# Patient Record
Sex: FEMALE | Race: BLACK OR AFRICAN AMERICAN | NOT HISPANIC OR LATINO | Employment: FULL TIME | URBAN - METROPOLITAN AREA
[De-identification: names, ages, dates, MRNs, and addresses within clinical notes are randomized per-mention and may not be internally consistent; named-entity substitution may affect disease eponyms.]

---

## 2017-01-19 ENCOUNTER — OFFICE VISIT (OUTPATIENT)
Dept: OPHTHALMOLOGY | Facility: CLINIC | Age: 37
End: 2017-01-19
Payer: COMMERCIAL

## 2017-01-19 ENCOUNTER — OFFICE VISIT (OUTPATIENT)
Dept: OBSTETRICS AND GYNECOLOGY | Facility: CLINIC | Age: 37
End: 2017-01-19
Payer: COMMERCIAL

## 2017-01-19 VITALS
BODY MASS INDEX: 44.22 KG/M2 | WEIGHT: 240.31 LBS | HEIGHT: 62 IN | SYSTOLIC BLOOD PRESSURE: 110 MMHG | DIASTOLIC BLOOD PRESSURE: 80 MMHG

## 2017-01-19 DIAGNOSIS — Z30.42 ENCOUNTER FOR DEPO-PROVERA CONTRACEPTION: Primary | ICD-10-CM

## 2017-01-19 DIAGNOSIS — H52.13 MYOPIA, BILATERAL: ICD-10-CM

## 2017-01-19 DIAGNOSIS — E11.9 DIABETES MELLITUS TYPE 2 WITHOUT RETINOPATHY: Primary | ICD-10-CM

## 2017-01-19 PROCEDURE — 96372 THER/PROPH/DIAG INJ SC/IM: CPT | Mod: S$GLB,,, | Performed by: NURSE PRACTITIONER

## 2017-01-19 PROCEDURE — 99999 PR PBB SHADOW E&M-EST. PATIENT-LVL I: CPT | Mod: PBBFAC,,, | Performed by: OPTOMETRIST

## 2017-01-19 PROCEDURE — 3079F DIAST BP 80-89 MM HG: CPT | Mod: S$GLB,,, | Performed by: NURSE PRACTITIONER

## 2017-01-19 PROCEDURE — 92015 DETERMINE REFRACTIVE STATE: CPT | Mod: S$GLB,,, | Performed by: OPTOMETRIST

## 2017-01-19 PROCEDURE — 3074F SYST BP LT 130 MM HG: CPT | Mod: S$GLB,,, | Performed by: NURSE PRACTITIONER

## 2017-01-19 PROCEDURE — 99999 PR PBB SHADOW E&M-EST. PATIENT-LVL III: CPT | Mod: PBBFAC,,, | Performed by: NURSE PRACTITIONER

## 2017-01-19 PROCEDURE — 92014 COMPRE OPH EXAM EST PT 1/>: CPT | Mod: S$GLB,,, | Performed by: OPTOMETRIST

## 2017-01-19 PROCEDURE — 3078F DIAST BP <80 MM HG: CPT | Mod: S$GLB,,, | Performed by: OPTOMETRIST

## 2017-01-19 PROCEDURE — 81025 URINE PREGNANCY TEST: CPT | Mod: S$GLB,,, | Performed by: NURSE PRACTITIONER

## 2017-01-19 PROCEDURE — 3074F SYST BP LT 130 MM HG: CPT | Mod: S$GLB,,, | Performed by: OPTOMETRIST

## 2017-01-19 PROCEDURE — 99213 OFFICE O/P EST LOW 20 MIN: CPT | Mod: 25,S$GLB,, | Performed by: NURSE PRACTITIONER

## 2017-01-19 PROCEDURE — 1159F MED LIST DOCD IN RCRD: CPT | Mod: S$GLB,,, | Performed by: NURSE PRACTITIONER

## 2017-01-19 RX ORDER — PEN NEEDLE, DIABETIC 31 GX5/16"
NEEDLE, DISPOSABLE MISCELLANEOUS
Refills: 0 | COMMUNITY
Start: 2016-12-30 | End: 2017-06-11 | Stop reason: SDUPTHER

## 2017-01-19 RX ORDER — IBUPROFEN 800 MG/1
800 TABLET ORAL EVERY 8 HOURS PRN
Refills: 0 | COMMUNITY
Start: 2016-12-22 | End: 2017-08-21

## 2017-01-19 RX ORDER — MEDROXYPROGESTERONE ACETATE 150 MG/ML
150 INJECTION, SUSPENSION INTRAMUSCULAR
Status: DISCONTINUED | OUTPATIENT
Start: 2017-01-19 | End: 2018-03-14

## 2017-01-19 RX ADMIN — MEDROXYPROGESTERONE ACETATE 150 MG: 150 INJECTION, SUSPENSION INTRAMUSCULAR at 12:01

## 2017-01-19 NOTE — PROGRESS NOTES
Depo Provera 150 mg given IM right ventrogluteal after negative upt.  Order per Martha Hewitt on 1/19/17. Advised pt. To remain 15 min. After injection. No complications. Next injection due between 4/6/17 and 4/20/17.  Next appt. Scheduled for 4/13/17.

## 2017-01-19 NOTE — PROGRESS NOTES
HPI     IDDM exam. No visual complaints. Last eye exam 01/14/2016 TRF. Update   glasses RX. Patient left glasses today.       Last edited by Henna Curran on 1/19/2017  1:43 PM.         Assessment /Plan     For exam results, see Encounter Report.    Diabetes mellitus type 2 without retinopathy    Myopia, bilateral      No Background Diabetic Retinopathy    Dispense Final Rx for glasses.  RTC 1 year

## 2017-01-19 NOTE — PROGRESS NOTES
"  Maikol Pacheco is a 36 y.o. female  presents for wanting to change from micronor to depo provera - discussed weight gain.   LMP: Patient's last menstrual period was 2017..  No issues, problems, or complaints.      Past Medical History   Diagnosis Date    Anemia     Diabetes mellitus type II      BS- 400's last week    Gastroparesis     Hyperlipidemia     Hypertension     Morbid obesity      Past Surgical History   Procedure Laterality Date    Tonsillectomy       Social History     Social History    Marital status: Single     Spouse name: N/A    Number of children: 0    Years of education: N/A     Occupational History     [employer]     Social History Main Topics    Smoking status: Never Smoker    Smokeless tobacco: Not on file    Alcohol use Yes      Comment: socially    Drug use: No    Sexual activity: Yes     Partners: Male     Birth control/ protection: Condom     Other Topics Concern    Not on file     Social History Narrative    Single. Lives with mom. Has no children. 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      Family History   Problem Relation Age of Onset    Hyperlipidemia Mother     Diabetes Maternal Grandfather     Cancer Maternal Aunt      breast    Diabetes Maternal Grandmother     Stroke Maternal Grandmother     Hypertension Other      OB History      Para Term  AB TAB SAB Ectopic Multiple Living    0 0 0 0 0 0 0 0 0 0          Visit Vitals    /80    Ht 5' 2" (1.575 m)    Wt 109 kg (240 lb 4.8 oz)    LMP 2017    BMI 43.95 kg/m2         ROS:  Per hpi    PHYSICAL EXAM:  APPEARANCE: Well nourished, well developed, in no acute distress.  AFFECT: WNL, alert and oriented x 3  deferred  Physical Exam    1. Encounter for Depo-Provera contraception  POCT urine pregnancy    medroxyPROGESTERone (DEPO-PROVERA) syringe 150 mg    AND PLAN:    Ok with weight gain states with work with it  upt was negative   start depo provera 150 " mg IM today

## 2017-03-12 ENCOUNTER — PATIENT MESSAGE (OUTPATIENT)
Dept: DIABETES | Facility: CLINIC | Age: 37
End: 2017-03-12

## 2017-03-17 ENCOUNTER — PATIENT MESSAGE (OUTPATIENT)
Dept: DIABETES | Facility: CLINIC | Age: 37
End: 2017-03-17

## 2017-03-17 ENCOUNTER — OFFICE VISIT (OUTPATIENT)
Dept: INTERNAL MEDICINE | Facility: CLINIC | Age: 37
End: 2017-03-17
Payer: COMMERCIAL

## 2017-03-17 VITALS
DIASTOLIC BLOOD PRESSURE: 80 MMHG | BODY MASS INDEX: 44.18 KG/M2 | HEIGHT: 62 IN | WEIGHT: 240.06 LBS | OXYGEN SATURATION: 99 % | TEMPERATURE: 99 F | SYSTOLIC BLOOD PRESSURE: 126 MMHG | HEART RATE: 103 BPM

## 2017-03-17 DIAGNOSIS — E11.69 HYPERLIPIDEMIA ASSOCIATED WITH TYPE 2 DIABETES MELLITUS: Chronic | ICD-10-CM

## 2017-03-17 DIAGNOSIS — I15.2 HYPERTENSION ASSOCIATED WITH DIABETES: Chronic | ICD-10-CM

## 2017-03-17 DIAGNOSIS — E78.5 HYPERLIPIDEMIA ASSOCIATED WITH TYPE 2 DIABETES MELLITUS: Chronic | ICD-10-CM

## 2017-03-17 DIAGNOSIS — E66.01 MORBID OBESITY WITH BMI OF 40.0-44.9, ADULT: ICD-10-CM

## 2017-03-17 DIAGNOSIS — E11.59 HYPERTENSION ASSOCIATED WITH DIABETES: Chronic | ICD-10-CM

## 2017-03-17 PROCEDURE — 99213 OFFICE O/P EST LOW 20 MIN: CPT | Mod: S$GLB,,, | Performed by: FAMILY MEDICINE

## 2017-03-17 PROCEDURE — 3060F POS MICROALBUMINURIA REV: CPT | Mod: S$GLB,,, | Performed by: FAMILY MEDICINE

## 2017-03-17 PROCEDURE — 3046F HEMOGLOBIN A1C LEVEL >9.0%: CPT | Mod: S$GLB,,, | Performed by: FAMILY MEDICINE

## 2017-03-17 PROCEDURE — 3079F DIAST BP 80-89 MM HG: CPT | Mod: S$GLB,,, | Performed by: FAMILY MEDICINE

## 2017-03-17 PROCEDURE — 3074F SYST BP LT 130 MM HG: CPT | Mod: S$GLB,,, | Performed by: FAMILY MEDICINE

## 2017-03-17 PROCEDURE — 1160F RVW MEDS BY RX/DR IN RCRD: CPT | Mod: S$GLB,,, | Performed by: FAMILY MEDICINE

## 2017-03-17 PROCEDURE — 99999 PR PBB SHADOW E&M-EST. PATIENT-LVL III: CPT | Mod: PBBFAC,,, | Performed by: FAMILY MEDICINE

## 2017-03-17 NOTE — MR AVS SNAPSHOT
[facility] Internal Medicine  [address] [place] [address]  Phone: [phone]  Fax: [phone]                  Maikol Pacheco   3/17/2017 3:20 PM   Office Visit    Description:  Female : 1980   Provider:  Maisha Bartholomew MD   Department:  [facility] - Internal Medicine           Reason for Visit     Follow-up           Diagnoses this Visit        Comments    Uncontrolled type 2 diabetes mellitus with diabetic polyneuropathy, with long-term current use of insulin    -  Primary     Hypertension associated with diabetes         Hyperlipidemia associated with type 2 diabetes mellitus         Morbid obesity with BMI of 40.0-44.9, adult                To Do List           Future Appointments        Provider Department Dept Phone    3/30/2017 1:20 PM LABORATORY, [facility] [phone]    3/30/2017 2:30 PM Brennan Granados Jr., GLENDY [facility] Diabetes Management [phone]    3/31/2017 4:00 PM Jesika Weber RD, CDE [facility] Diabetes Management [phone]    2017 1:30 PM OB GYN NURSE, [facility] OB/ -[phone]    2017 8:40 AM Maisha Bartholomew MD [facility] [phone]      Goals (5 Years of Data)     None      Follow-Up and Disposition     Return in about 4 months (around 2017), or if symptoms worsen or fail to improve.      [facility]     [facility] Care [facility] -  Assistance  Registered nurses in the [facility] provide clinical advisement, health education, appointment booking, and other advisory services.  Call for this free service at [phone].             Medications           Message regarding Medications     Verify the changes and/or additions to your medication regime listed below are the same as discussed with your clinician today.  If any of these changes or additions are incorrect, please notify your healthcare provider.             Verify that the below list of medications is an  "accurate representation of the medications you are currently taking.  If none reported, the list may be blank. If incorrect, please contact your healthcare provider. Carry this list with you in case of emergency.           Current Medications     albuterol 90 mcg/actuation inhaler Inhale 2 puffs into the lungs every 4 (four) hours as needed for Wheezing or Shortness of Breath (Or Cough).    BD INSULIN PEN NEEDLE UF MINI 31 gauge x 3/16" Ndle AS DIRECTED    blood sugar diagnostic (ONETOUCH ULTRA TEST) Strp 1 each by Misc.(Non-Drug; Combo Route) route 3 (three) times daily.    exenatide microspheres (BYDUREON) 2 mg SSRR Inject 2 mg into the skin once a week.    ferrous sulfate 325 mg (65 mg iron) Tab tablet Take 325 mg by mouth once daily.     glimepiride (AMARYL) 4 MG tablet Take 1 tablet (4 mg total) by mouth 2 (two) times daily.    ibuprofen (ADVIL,MOTRIN) 800 MG tablet Take 800 mg by mouth every 8 (eight) hours as needed.    insulin syringe-needle U-100 0.5 mL 31 gauge x 5/16 Syrg 1 Syringe by Misc.(Non-Drug; Combo Route) route 4 (four) times daily before meals and nightly.    lancets (ONETOUCH DELICA LANCETS) 33 gauge Misc 1 lancet by Misc.(Non-Drug; Combo Route) route 2 (two) times daily.    metformin (GLUMETZA) 1000 MG (MOD) 24 hr tablet Take 1 tablet (1,000 mg total) by mouth 2 (two) times daily with meals.    ramipril (ALTACE) 5 MG capsule Take 1 capsule (5 mg total) by mouth once daily.    simvastatin (ZOCOR) 20 MG tablet Take 1 tablet (20 mg total) by mouth every evening. Generic ok    trazodone (DESYREL) 50 MG tablet Take 1 tablet (50 mg total) by mouth every evening.    gabapentin (NEURONTIN) 300 MG capsule Take 1 capsule (300 mg total) by mouth every evening.    insulin aspart (NOVOLOG) 100 unit/mL InPn pen Inject 8 Units into the skin 3 (three) times daily with meals.           Clinical Reference Information           Your Vitals Were     BP Pulse Temp Height Weight SpO2    126/80 103 98.6 °F (37 °C) " "(Tympanic) 5' 2" (1.575 m) 108.9 kg (240 lb 1.3 oz) 99%    BMI                43.91 kg/m2          Blood Pressure          Most Recent Value    BP  126/80      Allergies as of 3/17/2017     No Known Drug Allergies      Immunizations Administered on Date of Encounter - 3/17/2017     None      Orders Placed During Today's Visit     Future Labs/Procedures Expected by Expires    Hemoglobin A1c  3/30/2017 5/16/2018      Language Assistance Services     ATTENTION: Language assistance services are available, free of charge. Please call [phone].      ATENCIÓN: Si habla español, tiene a kwan disposición servicios gratuitos de asistencia lingüística. Llame al [phone].     CHÚ Ý: N?u b?n nói Ti?ng Vi?t, có các d?ch v? h? tr? ngôn ng? mi?n phí dành cho b?n. G?i s? [phone].         [facility] complies with applicable Federal civil rights laws and does not discriminate on the basis of race, color, national origin, age, disability, or sex.        "

## 2017-03-17 NOTE — PROGRESS NOTES
Subjective:       Patient ID: Maikol Pacheco is a 36 y.o. female.    Chief Complaint: Follow-up    HPI Comments: 36-year-old Afro-American female patient with Patient Active Problem List:     Hyperlipidemia associated with type 2 diabetes mellitus     Hypertension associated with diabetes     Anemia     Morbid obesity     Uncontrolled type 2 diabetes mellitus with diabetic polyneuropathy, with long-term current use of insulin     Morbid obesity with BMI of 40.0-44.9, adult  Here for follow-up on chronic medical conditions.  Patient reports that she's been taking Amaryl , bydureon and metformin as recommended.   Patient had seen diabetes clinic end of December, and changes were made to insulin  regimen.  Patient currently reports that she's been taking NovoLog 8 units 3 times daily and has not initiated the change, of other insulins prescribed by diabetes clinic.   Patient did not take her insulin this morning and she was in a hurry  Reported that her blood glucose levels are running in the range of high 100 's and  it was 200 this morning  Denies of any polyuria polydipsia polyphagia, patient has been doing well since taking gabapentin with neuropathy  Patient missed work on 3/13/17 secondary to feeling dizzy and elevated blood glucose levels in the range of 300s.   Patient has been missing work 1- 2 days in a month secondary to fluctuating blood glucose levels, but reports that she's working on controlling her blood glucose levels       Review of Systems   Constitutional: Negative for fatigue.   Eyes: Negative for visual disturbance.   Respiratory: Negative for shortness of breath.    Cardiovascular: Negative for chest pain and leg swelling.   Gastrointestinal: Negative for abdominal pain, nausea and vomiting.   Endocrine: Negative for polydipsia, polyphagia and polyuria.   Musculoskeletal: Negative for myalgias.   Skin: Negative for rash.   Neurological: Negative for weakness, light-headedness, numbness  "and headaches.   Psychiatric/Behavioral: Negative for sleep disturbance.         /80  Pulse 103  Temp 98.6 °F (37 °C) (Tympanic)   Ht 5' 2" (1.575 m)  Wt 108.9 kg (240 lb 1.3 oz)  SpO2 99%  BMI 43.91 kg/m2  Objective:      Physical Exam   Constitutional: She is oriented to person, place, and time. She appears well-developed and well-nourished.   HENT:   Head: Normocephalic and atraumatic.   Mouth/Throat: Oropharynx is clear and moist.   Cardiovascular: Normal rate, regular rhythm and normal heart sounds.    No murmur heard.  Pulmonary/Chest: Effort normal and breath sounds normal. She has no wheezes.   Abdominal: Soft. Bowel sounds are normal. There is no tenderness.   Musculoskeletal: She exhibits no edema or tenderness.   Neurological: She is alert and oriented to person, place, and time.   Skin: Skin is warm and dry. No rash noted.   Psychiatric: She has a normal mood and affect.         Assessment:       1. Uncontrolled type 2 diabetes mellitus with diabetic polyneuropathy, with long-term current use of insulin    2. Hypertension associated with diabetes    3. Hyperlipidemia associated with type 2 diabetes mellitus    4. Morbid obesity with BMI of 40.0-44.9, adult        Plan:   Uncontrolled type 2 diabetes mellitus with diabetic polyneuropathy, with long-term current use of insulin  -     Hemoglobin A1c; Future; Expected date: 3/30/17  Patient was advised to start taking insulin as recommended by diabetes clinic   Will check A1c on the day of diabetes clinic visit .  Encouraged to be compliant with taking his medications, strict lifestyle changes recommended with diet and exercise      Hypertension associated with diabetes-blood pressure stable today, continue current regimen of taking ramipril 5 mg daily    Hyperlipidemia associated with type 2 diabetes mellitus-stable on simvastatin 80 mg daily    Morbid obesity with BMI of 40.0-44.9, adult-start strict lifestyle changes with diet and exercise to " lose weight with BMI 43.9.

## 2017-03-31 ENCOUNTER — NUTRITION (OUTPATIENT)
Dept: DIABETES | Facility: CLINIC | Age: 37
End: 2017-03-31
Payer: COMMERCIAL

## 2017-03-31 VITALS — HEIGHT: 62 IN | WEIGHT: 234.56 LBS | BODY MASS INDEX: 43.16 KG/M2

## 2017-03-31 DIAGNOSIS — E11.8 TYPE 2 DIABETES MELLITUS WITH COMPLICATION, WITH LONG-TERM CURRENT USE OF INSULIN: Primary | ICD-10-CM

## 2017-03-31 DIAGNOSIS — Z79.4 TYPE 2 DIABETES MELLITUS WITH COMPLICATION, WITH LONG-TERM CURRENT USE OF INSULIN: Primary | ICD-10-CM

## 2017-03-31 PROCEDURE — G0108 DIAB MANAGE TRN  PER INDIV: HCPCS | Mod: S$GLB,,, | Performed by: DIETITIAN, REGISTERED

## 2017-03-31 PROCEDURE — 99999 PR PBB SHADOW E&M-EST. PATIENT-LVL III: CPT | Mod: PBBFAC,,, | Performed by: DIETITIAN, REGISTERED

## 2017-03-31 NOTE — MR AVS SNAPSHOT
[facility] - Diabetes Management  [address] [place] [address]  Phone: [phone]  Fax: [phone]                  Maikol Pacheco   3/31/2017 4:00 PM   Nutrition    Description:  Female : 1980   Provider:  Jesika Weber RD, ZAIDA   Department:  [facility] - Diabetes Management           Reason for Visit     Diabetes                To Do List           Future Appointments        Provider Department Dept Phone    3/31/2017 4:00 PM Jesika Weber RD, CDE [facility] Diabetes Management [phone]    2017 12:05 PM LABORATORY, [facility] [phone]    2017 1:30 PM OB GYN NURSE, [facility] OB/ -[phone]    2017 2:30 PM Brennan Granados Jr., GLENDY [facility] Diabetes Management [phone]    2017 8:40 AM Maisha Bartholomew MD [facility] Internal Medicine [phone]      Goals (5 Years of Data)     None      Follow-Up and Disposition     Return in about 2 months (around 2017), or RS appt Ory/lab ?Thurs pm. Meter w/ Rx; [facility] appt.      [facility]     [facility] Care [facility] -  Assistance  Unless otherwise directed by your provider, please contact [facility], our nurse care line that is available for  assistance.     Registered nurses in the [facility] provide: appointment scheduling, clinical advisement, health education, and other advisory services.  Call: [phone] (toll free)               Medications           Message regarding Medications     Verify the changes and/or additions to your medication regime listed below are the same as discussed with your clinician today.  If any of these changes or additions are incorrect, please notify your healthcare provider.             Verify that the below list of medications is an accurate representation of the medications you are currently taking.  If none reported, the list may be blank. If incorrect, please contact your healthcare provider.  "Carry this list with you in case of emergency.           Current Medications     albuterol 90 mcg/actuation inhaler Inhale 2 puffs into the lungs every 4 (four) hours as needed for Wheezing or Shortness of Breath (Or Cough).    BD INSULIN PEN NEEDLE UF MINI 31 gauge x 3/16" Ndle AS DIRECTED    blood sugar diagnostic (ONETOUCH ULTRA TEST) Strp 1 each by Misc.(Non-Drug; Combo Route) route 3 (three) times daily.    exenatide microspheres (BYDUREON) 2 mg SSRR Inject 2 mg into the skin once a week.    ferrous sulfate 325 mg (65 mg iron) Tab tablet Take 325 mg by mouth once daily.     gabapentin (NEURONTIN) 300 MG capsule Take 1 capsule (300 mg total) by mouth every evening.    glimepiride (AMARYL) 4 MG tablet Take 1 tablet (4 mg total) by mouth 2 (two) times daily.    ibuprofen (ADVIL,MOTRIN) 800 MG tablet Take 800 mg by mouth every 8 (eight) hours as needed.    insulin aspart (NOVOLOG) 100 unit/mL InPn pen Inject 8 Units into the skin 3 (three) times daily with meals.    INSULIN GLARGINE,HUM.REC.ANLOG (LANTUS SOLOSTAR SUBQ) Inject 30 Units into the skin once daily.    lancets (ONETOUCH DELICA LANCETS) 33 gauge Misc 1 lancet by Misc.(Non-Drug; Combo Route) route 2 (two) times daily.    metformin (GLUMETZA) 1000 MG (MOD) 24 hr tablet Take 1 tablet (1,000 mg total) by mouth 2 (two) times daily with meals.    ramipril (ALTACE) 5 MG capsule Take 1 capsule (5 mg total) by mouth once daily.    simvastatin (ZOCOR) 20 MG tablet Take 1 tablet (20 mg total) by mouth every evening. Generic ok    trazodone (DESYREL) 50 MG tablet Take 1 tablet (50 mg total) by mouth every evening.    insulin syringe-needle U-100 0.5 mL 31 gauge x 5/16 Syrg 1 Syringe by Misc.(Non-Drug; Combo Route) route 4 (four) times daily before meals and nightly.           Clinical Reference Information           Your Vitals Were     Height Weight BMI          5' 2" (1.575 m) 106.4 kg (234 lb 9.1 oz) 42.9 kg/m2        Allergies as of 3/31/2017     No Known Drug " Allergies      Immunizations Administered on Date of Encounter - 3/31/2017     None      Language Assistance Services     ATTENTION: Language assistance services are available, free of charge. Please call [phone].      ATENCIÓN: Si gala lozano, tiene a kwan disposición servicios gratuitos de asistencia lingüística. Llame al [phone].     CHÚ Ý: N?u b?n nói Ti?ng Vi?t, có các d?ch v? h? tr? ngôn ng? mi?n phí dành cho b?n. G?i s? [phone].         [facility] complies with applicable Federal civil rights laws and does not discriminate on the basis of race, color, national origin, age, disability, or sex.

## 2017-03-31 NOTE — PROGRESS NOTES
Diabetes Education  Author: Jesika Weber RD, CDE  Date: 3/31/2017    Diabetes Education Visit  Diabetes Education Record Assessment/Progress: Comprehensive/Group    Diabetes Type  Diabetes Type : Type II    Diabetes History  Diabetes Diagnosis: 5-10 years (2010)    Nutrition  Meal Planning:  (Intake ~8555-2950 w/ excess carb, fat, sodium from dining out 2-3 times pwer week. Inadequate non-starchy vegetables. )  Meal Plan 24 Hour Recall - Breakfast: zack steve sandwich (reg), lg orange - water, coffee   Meal Plan 24 Hour Recall - Lunch: perdomo cheeseburger (1/2), side salad (ranch dressing) OR burger renay meal - flv water  Meal Plan 24 Hour Recall - Dinner: lean cuisine OR baked chix, cauliflower rice - water   Meal Plan 24 Hour Recall - Snack: fruit OR rice cake     Monitoring   Monitoring: Other  Self Monitoring : Per recall, fasting BG 200s-300.   Blood Glucose Logs: No    Exercise   Exercise Type: walking ( )  Intensity: Moderate  Frequency: 3-5 Times per week  Duration: 1 hour    Current Diabetes Treatment   Current Treatment: Oral Medication, Diet, Injectable, Exercise, Insulin    Social History  Preferred Learning Method: Face to Face  Primary Support: Self, Family  Occupation: Single. Lives with mom. Has no children. 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   Smoking Status: Never a Smoker  Alcohol Use: Rarely                             Barriers to Change  Barriers to Change: None  Learning Challenges : None    Readiness to Learn   Readiness to Learn : Eager    Cultural Influences  Cultural Influences: No    Diabetes Education Assessment/Progress  Acute Complications (preventing, detecting, and treating acute complications): Discussion, Competent (verbalizes/demonstrates), Written Materials Provided, Individual Session  Chronic Complications (preventing, detecting, and treating chronic complications): Discussion, Competent (verbalizes/demonstrates), Individual Session  Diabetes Disease Process  (diabetes disease process and treatment options): Discussion, Competent (verbalizes/demonstrates), Individual Session  Nutrition (Incorporating nutritional management into one's lifestyle): Discussion, Written Materials Provided, Competent (verbalizes/demonstrates), Individual Session  Physical Activity (incorporating physical activity into one's lifestyle): Discussion, Competent (verbalizes/demonstrates), Individual Session  Medications (states correct name, dose, onset, peak, duration, side effects & timing of meds): Discussion, Written Materials Provided, Competent (verbalizes/demonstrates), Individual Session  Monitoring (monitoring blood glucose/other parameters & using results): Discussion, Competent (verbalizes/demonstrates), Written Materials Provided, Individual Session  Goal Setting and Problem Solving (verbalizes behavior change strategies & sets realistic goals): Discussion, Competent (verbalizes/demonstrates), Individual Session  Behavior Change (developing personal strategies to health & behavior change): Discussion, Comnpetent (verbalizes/demonstrates), Individual Session  Psychosocial Issues (developing personal srategies to address psychosocial concerns): Competent (verbalizes/demonstrates), Discussion, Individual Session    Goals  Start Date: 03/31/17 (use meal plan - switch to zack montalvo, limit dining out to once per week, increase nonstarchy vegetable 2+ cups at lunch and dinner)  Target Date: 06/30/17  Monitoring: Set  Start Date: 03/31/17 (test bg fasting, acl, acd daily)  Target Date: 06/30/17         Diabetes Care Plan/Intervention  Education Plan/Intervention: Endocrine Provider Visit Set Up, Individual Follow-Up DSMT    Diabetes Meal Plan  Restrictions: Low Fat, Low Sodium  Calories: 1200  Carbohydrate Per Meal: 20-30g, 30-45g  Carbohydrate Per Snack : 15-20g    Education Units of Time   Time Spent: 30 min      Health Maintenance Topics with due status: Not Due       Topic Last  Completion Date    Pneumococcal PPSV23 (Medium Risk) 01/14/2014    TETANUS VACCINE 04/11/2014    Pap Smear 12/09/2016    Foot Exam 12/29/2016    Lipid Panel 12/29/2016    Hemoglobin A1c 12/29/2016    Eye Exam 01/19/2017     There are no preventive care reminders to display for this patient.

## 2017-04-02 ENCOUNTER — PATIENT MESSAGE (OUTPATIENT)
Dept: INTERNAL MEDICINE | Facility: CLINIC | Age: 37
End: 2017-04-02

## 2017-04-13 ENCOUNTER — CLINICAL SUPPORT (OUTPATIENT)
Dept: OBSTETRICS AND GYNECOLOGY | Facility: CLINIC | Age: 37
End: 2017-04-13
Payer: COMMERCIAL

## 2017-04-13 PROCEDURE — 96372 THER/PROPH/DIAG INJ SC/IM: CPT | Mod: S$GLB,,, | Performed by: NURSE PRACTITIONER

## 2017-04-13 RX ADMIN — MEDROXYPROGESTERONE ACETATE 150 MG: 150 INJECTION, SUSPENSION INTRAMUSCULAR at 01:04

## 2017-04-13 NOTE — PROGRESS NOTES
Identified patient using two patient identifiers. Allergies verified with patient. Depo Provera 150mg IM injection given in left ventrogluteal. Patient tolerated well and was advised to remain in the office for 15 minutes. Patient verbalized understanding. Next injection due between 06/29/17 and 07/13/17. Appointment scheduled and patient is aware of appointment date, time and location.

## 2017-04-24 ENCOUNTER — PATIENT MESSAGE (OUTPATIENT)
Dept: INTERNAL MEDICINE | Facility: CLINIC | Age: 37
End: 2017-04-24

## 2017-05-01 ENCOUNTER — PATIENT MESSAGE (OUTPATIENT)
Dept: INTERNAL MEDICINE | Facility: CLINIC | Age: 37
End: 2017-05-01

## 2017-05-01 DIAGNOSIS — J04.0 LARYNGITIS ACUTE, SPASMODIC: ICD-10-CM

## 2017-05-01 RX ORDER — INSULIN ASPART 100 [IU]/ML
8 INJECTION, SOLUTION INTRAVENOUS; SUBCUTANEOUS
Qty: 7.2 ML | Refills: 11 | Status: SHIPPED | OUTPATIENT
Start: 2017-05-01 | End: 2017-06-25 | Stop reason: SDUPTHER

## 2017-05-01 RX ORDER — ALBUTEROL SULFATE 90 UG/1
2 AEROSOL, METERED RESPIRATORY (INHALATION) EVERY 4 HOURS PRN
Qty: 1 INHALER | Refills: 0 | Status: SHIPPED | OUTPATIENT
Start: 2017-05-01 | End: 2017-11-07 | Stop reason: SDUPTHER

## 2017-05-01 RX ORDER — INSULIN GLARGINE 100 [IU]/ML
30 INJECTION, SOLUTION SUBCUTANEOUS DAILY
Qty: 15 ML | Refills: 4 | Status: SHIPPED | OUTPATIENT
Start: 2017-05-01 | End: 2017-06-15 | Stop reason: ALTCHOICE

## 2017-05-01 NOTE — TELEPHONE ENCOUNTER
Last visit with Dr. Bartholomew on 3/17/17. Last refill was 11/29/16 by Dr. Barron. Pt's next appt is with Dr. Bartholomew on 7/27/17.

## 2017-06-09 ENCOUNTER — NUTRITION (OUTPATIENT)
Dept: DIABETES | Facility: CLINIC | Age: 37
End: 2017-06-09
Payer: COMMERCIAL

## 2017-06-09 ENCOUNTER — LAB VISIT (OUTPATIENT)
Dept: LAB | Facility: HOSPITAL | Age: 37
End: 2017-06-09
Attending: FAMILY MEDICINE
Payer: COMMERCIAL

## 2017-06-09 VITALS — BODY MASS INDEX: 41.86 KG/M2 | WEIGHT: 227.5 LBS | HEIGHT: 62 IN

## 2017-06-09 DIAGNOSIS — E11.8 TYPE 2 DIABETES MELLITUS WITH COMPLICATION, WITH LONG-TERM CURRENT USE OF INSULIN: Primary | ICD-10-CM

## 2017-06-09 DIAGNOSIS — Z79.4 TYPE 2 DIABETES MELLITUS WITH COMPLICATION, WITH LONG-TERM CURRENT USE OF INSULIN: Primary | ICD-10-CM

## 2017-06-09 PROCEDURE — 99999 PR PBB SHADOW E&M-EST. PATIENT-LVL III: CPT | Mod: PBBFAC,,, | Performed by: DIETITIAN, REGISTERED

## 2017-06-09 PROCEDURE — G0108 DIAB MANAGE TRN  PER INDIV: HCPCS | Mod: S$GLB,,, | Performed by: DIETITIAN, REGISTERED

## 2017-06-09 PROCEDURE — 83036 HEMOGLOBIN GLYCOSYLATED A1C: CPT

## 2017-06-09 PROCEDURE — 36415 COLL VENOUS BLD VENIPUNCTURE: CPT | Mod: PO

## 2017-06-09 RX ORDER — METFORMIN HYDROCHLORIDE 1000 MG/1
1000 TABLET, FILM COATED, EXTENDED RELEASE ORAL 2 TIMES DAILY WITH MEALS
COMMUNITY
End: 2017-10-02

## 2017-06-09 RX ORDER — METFORMIN HYDROCHLORIDE 500 MG/1
500 TABLET ORAL 2 TIMES DAILY WITH MEALS
COMMUNITY
End: 2017-06-09 | Stop reason: CLARIF

## 2017-06-09 NOTE — PROGRESS NOTES
Diabetes Education  Author: Jesika Weber RD, CDE  Date: 6/9/2017    Diabetes Education Visit  Diabetes Education Record Assessment/Progress: Comprehensive/Group    Diabetes Type  Diabetes Type : Type II    Nutrition  Meal Planning:  (7669-6955 cals/d w/ excess carb, fat, sodium from dining out meals.)  Meal Plan 24 Hour Recall - Breakfast: eggs, sausage OR slim fast shake, boiled egg - water   Meal Plan 24 Hour Recall - Lunch: fried fish, lasagne - vit water zero   Meal Plan 24 Hour Recall - Dinner: taco bell - diet pepsi   Meal Plan 24 Hour Recall - Snack: sf popsicle, water    Monitoring   Monitoring: Other  Self Monitoring : Per recall, fst bg 230-240; rare midday 190s; bed 270-280.     Blood Glucose Logs: No    Exercise   Exercise Type: walking ([employer] - walks 60 min 3d/wk)    Current Diabetes Treatment   Current Treatment: Oral Medication, Diet, Exercise, Injectable, Insulin    Social History  Preferred Learning Method: Face to Face  Primary Support: Self, Family  Occupation: Single. Lives with mom. Has no children. 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   Smoking Status: Never a Smoker  Alcohol Use: Never      Barriers to Change  Barriers to Change: None  Learning Challenges : None    Readiness to Learn   Readiness to Learn : Acceptance    Cultural Influences  Cultural Influences: No    Diabetes Education Assessment/Progress  Acute Complications (preventing, detecting, and treating acute complications): Discussion, Competent (verbalizes/demonstrates), Written Materials Provided, Individual Session  Chronic Complications (preventing, detecting, and treating chronic complications): Discussion, Competent (verbalizes/demonstrates), Individual Session  Diabetes Disease Process (diabetes disease process and treatment options): Discussion, Competent (verbalizes/demonstrates), Individual Session  Nutrition (Incorporating nutritional management into one's lifestyle): Discussion, Written Materials  Provided, Competent (verbalizes/demonstrates), Individual Session (Custom menus developed w/ pt to assist improvement of food quality.)  Physical Activity (incorporating physical activity into one's lifestyle): Discussion, Competent (verbalizes/demonstrates), Individual Session  Medications (states correct name, dose, onset, peak, duration, side effects & timing of meds): Discussion, Written Materials Provided, Competent (verbalizes/demonstrates), Individual Session  Monitoring (monitoring blood glucose/other parameters & using results): Discussion, Competent (verbalizes/demonstrates), Written Materials Provided, Individual Session (Reminded pt to bring meter/logbook to clinic.)  Goal Setting and Problem Solving (verbalizes behavior change strategies & sets realistic goals): Discussion, Competent (verbalizes/demonstrates), Individual Session, Written Materials Provided  Behavior Change (developing personal strategies to health & behavior change): Discussion, Comnpetent (verbalizes/demonstrates), Individual Session  Psychosocial Issues (developing personal srategies to address psychosocial concerns): Competent (verbalizes/demonstrates), Discussion, Individual Session    Goals  Healthy Eating: Set  Start Date: 06/09/17 (use meal plan - custom menus, avoid fast foods)  Target Date: 09/09/17  Monitoring: Set (test bg fasting, acl, acd daily; bring meter, records to clinic)  Start Date: 06/09/17  Target Date: 09/09/17  Medications: Set  Start Date: 06/09/17 (Due to glu patterns, instructed to increase novolog 12 units ac. Continue other medications as directed and fu with Ciara next week for additional eval. Will update A1C per protocol. )  Target Date: 09/09/17    Diabetes Care Plan/Intervention  Education Plan/Intervention: Individual Follow-Up DSMT, Endocrine Provider Visit Set Up    Diabetes Meal Plan  Restrictions: Low Fat, Low Sodium  Calories: 1200, 1400  Carbohydrate Per Meal: 20-30g, 30-45g  Carbohydrate  Per Snack : 15-20g, 7-15g    Education Units of Time   Time Spent: 30 min      Health Maintenance Topics with due status: Not Due       Topic Last Completion Date    Pneumococcal PPSV23 (Medium Risk) 01/14/2014    TETANUS VACCINE 04/11/2014    Influenza Vaccine 10/28/2016    Pap Smear with HPV Cotest 12/09/2016    Foot Exam 12/29/2016    Lipid Panel 12/29/2016    Hemoglobin A1c 12/29/2016    Eye Exam 01/19/2017     There are no preventive care reminders to display for this patient.

## 2017-06-10 LAB
ESTIMATED AVG GLUCOSE: 269 MG/DL
HBA1C MFR BLD HPLC: 11 %

## 2017-06-12 RX ORDER — PEN NEEDLE, DIABETIC 31 GX5/16"
NEEDLE, DISPOSABLE MISCELLANEOUS
Qty: 100 EACH | Refills: 11 | Status: SHIPPED | OUTPATIENT
Start: 2017-06-12 | End: 2019-03-11 | Stop reason: SDUPTHER

## 2017-06-15 ENCOUNTER — LAB VISIT (OUTPATIENT)
Dept: LAB | Facility: HOSPITAL | Age: 37
End: 2017-06-15
Attending: PEDIATRICS
Payer: COMMERCIAL

## 2017-06-15 ENCOUNTER — OFFICE VISIT (OUTPATIENT)
Dept: DIABETES | Facility: CLINIC | Age: 37
End: 2017-06-15
Payer: COMMERCIAL

## 2017-06-15 VITALS
DIASTOLIC BLOOD PRESSURE: 70 MMHG | SYSTOLIC BLOOD PRESSURE: 110 MMHG | HEIGHT: 62 IN | BODY MASS INDEX: 42.28 KG/M2 | WEIGHT: 229.75 LBS

## 2017-06-15 LAB — GLUCOSE SERPL-MCNC: 182 MG/DL (ref 70–110)

## 2017-06-15 PROCEDURE — 99999 PR PBB SHADOW E&M-EST. PATIENT-LVL III: CPT | Mod: PBBFAC,,, | Performed by: PHYSICIAN ASSISTANT

## 2017-06-15 PROCEDURE — 83036 HEMOGLOBIN GLYCOSYLATED A1C: CPT

## 2017-06-15 PROCEDURE — 36415 COLL VENOUS BLD VENIPUNCTURE: CPT | Mod: PO

## 2017-06-15 PROCEDURE — 99214 OFFICE O/P EST MOD 30 MIN: CPT | Mod: S$GLB,,, | Performed by: PHYSICIAN ASSISTANT

## 2017-06-15 PROCEDURE — 3046F HEMOGLOBIN A1C LEVEL >9.0%: CPT | Mod: S$GLB,,, | Performed by: PHYSICIAN ASSISTANT

## 2017-06-15 PROCEDURE — 82948 REAGENT STRIP/BLOOD GLUCOSE: CPT | Mod: S$GLB,,, | Performed by: PHYSICIAN ASSISTANT

## 2017-06-15 NOTE — PROGRESS NOTES
Subjective:      Patient ID: Maikol Pacheco is a 36 y.o. female.    PCP: Maisha Bartholomew MD      Maikol Pacheco is a pleasant 36 y.o. female presenting to follow up on diabetes mellitus. She has had diabetes for 6 or more years. Her last visit in Diabetes Management was 12/29/2016 Since that time she has had mild improvement in her glycemia. Her blood sugar range fasting has been 180's and 2 hour post meal has been 200+, and she has been monitoring 1-2 times per day as directed. Her current concerns are glycemic control.    She denies any hospital admissions, emergency room visits, hypoglycemia, syncope, diaphoresis, chest pain, or dyspnea.    She has gained 2 pounds since last visit. Her BMI is 42.02    Her blood sugar in the clinic today was:   Lab Results   Component Value Date    POCGLU 352 (A) 12/29/2016       We discussed the American diabetes Association recommendations:  hemoglobin A1c below 7.0%; all diabetics should be on statins unless contraindicated; one aspirin daily unless contraindicated; fasting blood sugar between 80 and 130 mg/dL; postprandial blood sugar below 180 mg/dl; prevention of hypoglycemia, may adjust goals to higher levels if persistent; ACE or ARB therapy if not contraindicated; and maintain in an ideal body weight with BMI below 25.    Maikol is compliant most of the time with DM medications.     Maikol is compliant most of the time with lifestyle modifications to include activity and meal planning.       STANDARDS OF CARE:   Eye exam: Dr. Hyatt in 1/2017.  Dental exam: Has regular exams; denies gums bleeding.  Podiatry exam: None.  SOCIAL: Single. Lives with mom. Has no children. 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     ACTIVITY LEVEL: Walks 3 times weekly for 30 minutes, goes to gym 4-5 times weekly.  MEAL PLANNING: Number of meals per day - two. Number of snacks per day - one. Per dietary recall, patient is not limiting carbohydrates, saturated fats and  sodium. BLOOD GLUCOSE TESTING: Self-monitoring with [employer] and      The following results were reviewed with patient.    Lab Results   Component Value Date    WBC 7.28 09/22/2016    HGB 11.8 (L) 09/22/2016    HCT 35.1 (L) 09/22/2016     09/22/2016    CHOL 174 12/29/2016    TRIG 110 12/29/2016    HDL 52 12/29/2016    ALT 12 12/29/2016    AST 11 12/29/2016     (L) 12/29/2016    K 4.2 12/29/2016     12/29/2016    CREATININE 0.8 12/29/2016    ESTGFRAFRICA >60.0 12/29/2016    EGFRNONAA >60.0 12/29/2016    BUN 11 12/29/2016    CO2 24 12/29/2016    TSH 0.598 12/29/2016     (H) 12/29/2016       Lab Results   Component Value Date    HGBA1C 11.0 (H) 06/09/2017    HGBA1C 12.9 (H) 12/29/2016    HGBA1C 12.9 (H) 09/01/2016       Lab Results   Component Value Date    CPEPTIDE 2.2 12/29/2016     Lab Results   Component Value Date    TSH 0.598 12/29/2016     Lab Results   Component Value Date    IRON 42 05/23/2013    TIBC 297.0 05/23/2013    FERRITIN 45 09/22/2016     Lab Results   Component Value Date    XLUVDROO60 873 05/23/2013     Lab Results   Component Value Date    CALCIUM 9.0 12/29/2016    PHOS 3.0 12/29/2016       Review of patient's allergies indicates:   Allergen Reactions    No known drug allergies        Past Medical History:   Diagnosis Date    Anemia     Diabetes mellitus type II 2008    BS- 400's last week    DM (diabetes mellitus) 2006     01/18/2017    Gastroparesis     Hyperlipidemia     Hypertension     Morbid obesity        Review of Systems   Constitutional: Negative.  Negative for activity change, appetite change, chills, diaphoresis, fatigue, fever and unexpected weight change.   HENT: Negative.  Negative for congestion, dental problem, drooling, ear discharge, ear pain, facial swelling, hearing loss, mouth sores, nosebleeds, postnasal drip, rhinorrhea, sinus pressure, sneezing, sore throat, tinnitus, trouble swallowing and voice change.    Eyes: Negative.  Negative  "for photophobia, pain, discharge, redness, itching and visual disturbance.   Respiratory: Negative.  Negative for apnea, cough, choking, chest tightness, shortness of breath, wheezing and stridor.    Cardiovascular: Negative.  Negative for chest pain, palpitations and leg swelling.   Gastrointestinal: Negative.  Negative for abdominal distention, abdominal pain, anal bleeding, blood in stool, constipation, diarrhea, nausea, rectal pain and vomiting.   Endocrine: Negative.  Negative for cold intolerance, heat intolerance, polydipsia, polyphagia and polyuria.   Genitourinary: Negative.  Negative for decreased urine volume, difficulty urinating, dyspareunia, dysuria, enuresis, flank pain, frequency, genital sores, hematuria, menstrual problem, pelvic pain, urgency, vaginal bleeding, vaginal discharge and vaginal pain.   Musculoskeletal: Negative.  Negative for arthralgias, back pain, gait problem, joint swelling, myalgias, neck pain and neck stiffness.   Skin: Negative.  Negative for color change, pallor, rash and wound.   Allergic/Immunologic: Negative.  Negative for environmental allergies, food allergies and immunocompromised state.   Neurological: Negative.  Negative for dizziness, tremors, seizures, syncope, facial asymmetry, speech difficulty, weakness, light-headedness, numbness and headaches.   Hematological: Negative.  Negative for adenopathy. Does not bruise/bleed easily.   Psychiatric/Behavioral: Negative.  Negative for agitation, behavioral problems, confusion, decreased concentration, dysphoric mood, hallucinations, self-injury, sleep disturbance and suicidal ideas. The patient is not nervous/anxious and is not hyperactive.       Objective:     Vitals - 1 value per visit 3/31/2017 6/9/2017 6/15/2017   SYSTOLIC - - 110   DIASTOLIC - - 70   PULSE - - -   TEMPERATURE - - -   SPO2 - - -   Weight (lb) 234.57 227.52 229.72   Weight (kg) 106.4 103.2 104.2   HEIGHT 5' 2" 5' 2" 5' 2"   BODY MASS INDEX 42.9 41.61 " 42.02   VISIT REPORT - - -   Pain Score  0 0 0       Physical Exam   Constitutional: She is oriented to person, place, and time. She appears well-developed and well-nourished. She is cooperative.  Non-toxic appearance. She does not have a sickly appearance. She does not appear ill. No distress. She is not intubated.   HENT:   Head: Normocephalic and atraumatic. Not macrocephalic and not microcephalic. Head is without raccoon's eyes, without Bains's sign, without abrasion, without contusion, without laceration, without right periorbital erythema and without left periorbital erythema. Hair is normal.   Right Ear: Hearing, tympanic membrane, external ear and ear canal normal. No lacerations. No drainage, swelling or tenderness. No foreign bodies. No mastoid tenderness. Tympanic membrane is not injected, not scarred, not perforated, not erythematous, not retracted and not bulging. Tympanic membrane mobility is normal. No middle ear effusion. No hemotympanum. No decreased hearing is noted.   Left Ear: Hearing, tympanic membrane, external ear and ear canal normal. No lacerations. No drainage, swelling or tenderness. No foreign bodies. No mastoid tenderness. Tympanic membrane is not injected, not scarred, not perforated, not erythematous, not retracted and not bulging. Tympanic membrane mobility is normal.  No middle ear effusion. No hemotympanum. No decreased hearing is noted.   Nose: Nose normal. No mucosal edema, rhinorrhea, nose lacerations, sinus tenderness, nasal deformity, septal deviation or nasal septal hematoma. No epistaxis.  No foreign bodies. Right sinus exhibits no maxillary sinus tenderness and no frontal sinus tenderness. Left sinus exhibits no maxillary sinus tenderness and no frontal sinus tenderness.   Mouth/Throat: Oropharynx is clear and moist. No oropharyngeal exudate.   Eyes: Conjunctivae and EOM are normal. Pupils are equal, round, and reactive to light. Right eye exhibits no chemosis, no discharge  and no exudate. No foreign body present in the right eye. Left eye exhibits no chemosis, no discharge, no exudate and no hordeolum. No foreign body present in the left eye. Right conjunctiva is not injected. Right conjunctiva has no hemorrhage. Left conjunctiva is not injected. Left conjunctiva has no hemorrhage. No scleral icterus. Right eye exhibits normal extraocular motion and no nystagmus. Left eye exhibits normal extraocular motion and no nystagmus. Right pupil is round and reactive. Left pupil is round and reactive. Pupils are equal.   Fundoscopic exam:       The right eye shows no arteriolar narrowing, no AV nicking, no exudate, no hemorrhage and no papilledema. The right eye shows red reflex and venous pulsations.        The left eye shows no arteriolar narrowing, no AV nicking, no exudate, no hemorrhage and no papilledema. The left eye shows red reflex and venous pulsations.   Neck: Trachea normal, normal range of motion and full passive range of motion without pain. Neck supple. Normal carotid pulses, no hepatojugular reflux and no JVD present. No tracheal tenderness, no spinous process tenderness and no muscular tenderness present. Carotid bruit is not present. No no neck rigidity. No tracheal deviation, no edema, no erythema and normal range of motion present. No thyroid mass and no thyromegaly present.   Cardiovascular: Normal rate, regular rhythm, normal heart sounds and intact distal pulses.   No extrasystoles are present. PMI is not displaced.  Exam reveals no gallop, no friction rub and no decreased pulses.    No murmur heard.  Pulses:       Dorsalis pedis pulses are 2+ on the right side, and 2+ on the left side.        Posterior tibial pulses are 2+ on the right side, and 2+ on the left side.   Pulmonary/Chest: Effort normal and breath sounds normal. No accessory muscle usage or stridor. No apnea, no tachypnea and no bradypnea. She is not intubated. No respiratory distress. She has no decreased  breath sounds. She has no wheezes. She has no rhonchi. She has no rales. Chest wall is not dull to percussion. She exhibits no mass, no tenderness, no bony tenderness, no laceration, no crepitus, no edema, no deformity, no swelling and no retraction.   Abdominal: Soft. Normal appearance and bowel sounds are normal. She exhibits no shifting dullness, no distension, no pulsatile liver, no fluid wave, no abdominal bruit, no ascites, no pulsatile midline mass and no mass. There is no hepatosplenomegaly, splenomegaly or hepatomegaly. There is no tenderness. There is no rigidity, no rebound, no guarding, no CVA tenderness, no tenderness at McBurney's point and negative Doran's sign.   Musculoskeletal: Normal range of motion. She exhibits no edema or tenderness.        Right foot: There is normal range of motion and no deformity.        Left foot: There is normal range of motion and no deformity.   Feet:   Right Foot:   Protective Sensation: 5 sites tested. 5 sites sensed.   Skin Integrity: Negative for ulcer, blister, skin breakdown, erythema, warmth, callus or dry skin.   Left Foot:   Protective Sensation: 5 sites tested. 5 sites sensed.   Skin Integrity: Negative for ulcer, blister, skin breakdown, erythema, warmth, callus or dry skin.   Lymphadenopathy:        Head (right side): No submental, no submandibular, no tonsillar, no preauricular, no posterior auricular and no occipital adenopathy present.        Head (left side): No submental, no submandibular, no tonsillar, no preauricular, no posterior auricular and no occipital adenopathy present.     She has no cervical adenopathy.        Right cervical: No superficial cervical, no deep cervical and no posterior cervical adenopathy present.       Left cervical: No superficial cervical, no deep cervical and no posterior cervical adenopathy present.     She has no axillary adenopathy.   Neurological: She is alert and oriented to person, place, and time. She has normal  reflexes. She is not disoriented. She displays no atrophy, no tremor and normal reflexes. No cranial nerve deficit or sensory deficit. She exhibits normal muscle tone. She displays no seizure activity. Coordination and gait normal.   Reflex Scores:       Bicep reflexes are 2+ on the right side and 2+ on the left side.       Brachioradialis reflexes are 2+ on the right side and 2+ on the left side.       Patellar reflexes are 2+ on the right side and 2+ on the left side.  Skin: Skin is warm and dry. No abrasion, no bruising, no burn, no ecchymosis, no laceration, no lesion, no petechiae, no purpura and no rash noted. Rash is not macular, not papular, not maculopapular, not nodular, not pustular, not vesicular and not urticarial. She is not diaphoretic. No cyanosis or erythema. No pallor. Nails show no clubbing.   Psychiatric: She has a normal mood and affect. Her behavior is normal. Judgment and thought content normal. Her mood appears not anxious. Her affect is not angry, not blunt and not labile. Her speech is not rapid and/or pressured, not delayed, not tangential and not slurred. She is not agitated, not aggressive, not hyperactive, not slowed, not withdrawn, not actively hallucinating and not combative. Thought content is not paranoid and not delusional. Cognition and memory are not impaired. She does not express impulsivity or inappropriate judgment. She does not exhibit a depressed mood. She expresses no homicidal and no suicidal ideation. She expresses no suicidal plans and no homicidal plans. She is communicative. She exhibits normal recent memory and normal remote memory. She is attentive.   Nursing note and vitals reviewed.    Assessment:     1. Uncontrolled type 2 diabetes mellitus with diabetic polyneuropathy, with long-term current use of insulin       Plan:   Maikol Pacheco is seen today for   1. Uncontrolled type 2 diabetes mellitus with diabetic polyneuropathy, with long-term current use of  insulin      We have discussed the etiology and treatment options associated with the diagnosis as well as alternatives. She has elected the following treatments.     Uncontrolled type 2 diabetes mellitus with diabetic polyneuropathy, with long-term current use of insulin  -     POCT glucose  -     Hemoglobin A1c; Future; Expected date: 06/15/2017  -     insulin detemir (LEVEMIR FLEXTOUCH) 100 unit/mL (3 mL) SubQ InPn pen; Inject 30 Units into the skin every evening.  Dispense: 27 mL; Refill: 3  -     dulaglutide 1.5 mg/0.5 mL PnIj; Inject 1.5 mg into the skin every 7 days.  Dispense: 4 Syringe; Refill: 11    1.) Patient was instructed to monitor blood glucose twice daily, fasting, and 2 hour post meal; if on Multiple Daily Injections (MDI) she will need to have pre-meal blood glucose as well. Reminded to bring BG meter or record to each visit for review.  2.) Reviewed pathophysiology of type 2 diabetes, complications related to the disease, importance of annual dilated eye exam and self daily foot examination.  3.) Continue medications as prescribed Levemir; Novolog; Trulicity; Metformin. [facility] MyChart or Phone review in 1 week with BG records for adjustment of medication.  4.) Reviewed carb counting, portion control, importance of spacing meals throughout the day to prevent post prandial elevations. Recommended low saturated fat, low sodium diet to aid in control of hypertension and cholesterol.  5.) Discussed activity, benefits, methods, and precautions. Recommended patient start/continue some form of exercise and increase as tolerated to 60 minutes per day to facilitate weight loss and aid in control of BGs. Also reminded patient of WHO recommendation of 10,000 steps daily as a goal.   6.) A1C, TSH, Lipid Panel, CMP with eGFR and Micro/Creatinine per ADA protocol.  7.) Return to clinic in 3 months for follow up. Advised patient to call clinic with any questions or concerns.     I have reviewed your results and  "they are still quite high. I would like you to start "Treating to Target". The treatment will be Insulin and your target will be the Fasting and 2 hour post meal blood sugar. It will work in this manner;    1. Goal for Fasting blood sugar is  mg/dl. I realize that you will need time to adjust to the new levels and presently you may feel too low if you are too aggressive now. So go slow and aim to lower your blood sugar to below 200 then 150 then 100 over several months.    2. Goal for 2 hour post meal blood sugar is below 180 mg/dl, here the same rules apply as in #1.    3. You will check your fasting blood sugar daily, if not where we would like it to be over a 3 day period then that evening we will increase the Lantus dose by 5 units. Then repeat the process over the next 3 days. Remember this is a slow process and take our time getting to goal. But, each week should be better than prior weeks. Blood sugars below 70 are unacceptable and should raise a "RED FLAG" where we may have to reduce our dose of insulin.    4. You will check your post meal glucose daily as well. However, each day you will check a different meal, (ie. Monday-breakfast; Tuesday- lunch; Wednesday- supper, then repeat). If your post meal glucose is not where we would like, increase pre-meal insulin by 2 units next time. A word of CAUTION: mealtime insulin is dependant on the size and concentration of your meal content. If not consuming a large meal do not take large dose of insulin. Use the reasonable person rule.     5. If you have any questions please do not hesitate to call.    Intensive insulin Therapy with correction factor:    You are on Intensive insulin therapy with Basal and Bolus insulin. Lantus, Levamir or NPH is your Basal insulin and will help maintain your fasting and between meal sugar. Your fast acting or rescue insulin is either Humalog, Novalog or Regular insulin and will control your post meal sugar.     You will Take 35 " "units of Levemir at 9 pm each night. This will be adjusted up or down depending on your fasting blood sugar before breakfast.    Novolog will follow this pre meal schedule; Correction factor of "2 units per 50 mg/dl" and is based on 30-50 grams of carbohydrates per meal.    If blood sugar is below 70 eat first then check your blood sugar 2 hours later and make correction.  If blood pre-meal sugar is  70 -150 take 14 units of Novolog;  If blood pre-meal sugar is 151-200 take +2 units of Novolog;  If blood pre-meal sugar is 201-250 take +4 units of Novolog;  If blood pre-meal sugar is 251-300 take +6 units of Novolog;  If blood pre-meal sugar is 301-350 take +8 units of Novolog;  If blood pre-meal sugar is 351-400+ take +10 units of Novolog;  Also increase water intake and call for appointment.    A total of 50 minutes was spent in face to face time, of which 50 % was spent in counseling patient on disease process, complications, treatment, and side effects of medications.    The patient was explained the above plan and given opportunity to ask questions.  She understands, chooses and consents to this plan and accepts all the risks, which include but are not limited to the risks mentioned above.   She understands the alternative of having no testing, interventions or treatments at this time. She left content and without further questions.   "

## 2017-06-16 LAB
ESTIMATED AVG GLUCOSE: 260 MG/DL
HBA1C MFR BLD HPLC: 10.7 %

## 2017-06-16 RX ORDER — LANCETS 33 GAUGE
1 EACH MISCELLANEOUS 2 TIMES DAILY
Qty: 100 EACH | Refills: 11 | Status: SHIPPED | OUTPATIENT
Start: 2017-06-16 | End: 2018-08-25 | Stop reason: SDUPTHER

## 2017-06-25 RX ORDER — INSULIN ASPART 100 [IU]/ML
INJECTION, SOLUTION INTRAVENOUS; SUBCUTANEOUS
Qty: 21.6 ML | Refills: 11 | Status: SHIPPED | OUTPATIENT
Start: 2017-06-25 | End: 2017-09-28 | Stop reason: SDUPTHER

## 2017-06-25 NOTE — PATIENT INSTRUCTIONS
" I have reviewed your results and they are still quite high. I would like you to start "Treating to Target". The treatment will be Insulin and your target will be the Fasting and 2 hour post meal blood sugar. It will work in this manner;    1. Goal for Fasting blood sugar is  mg/dl. I realize that you will need time to adjust to the new levels and presently you may feel too low if you are too aggressive now. So go slow and aim to lower your blood sugar to below 200 then 150 then 100 over several months.    2. Goal for 2 hour post meal blood sugar is below 180 mg/dl, here the same rules apply as in #1.    3. You will check your fasting blood sugar daily, if not where we would like it to be over a 3 day period then that evening we will increase the Lantus dose by 5 units. Then repeat the process over the next 3 days. Remember this is a slow process and take our time getting to goal. But, each week should be better than prior weeks. Blood sugars below 70 are unacceptable and should raise a "RED FLAG" where we may have to reduce our dose of insulin.    4. You will check your post meal glucose daily as well. However, each day you will check a different meal, (ie. Monday-breakfast; Tuesday- lunch; Wednesday- supper, then repeat). If your post meal glucose is not where we would like, increase pre-meal insulin by 2 units next time. A word of CAUTION: mealtime insulin is dependant on the size and concentration of your meal content. If not consuming a large meal do not take large dose of insulin. Use the reasonable person rule.     5. If you have any questions please do not hesitate to call.    Intensive insulin Therapy with correction factor:    You are on Intensive insulin therapy with Basal and Bolus insulin. Lantus, Levamir or NPH is your Basal insulin and will help maintain your fasting and between meal sugar. Your fast acting or rescue insulin is either Humalog, Novalog or Regular insulin and will control your " "post meal sugar.     You will Take 35 units of Levemir at 9 pm each night. This will be adjusted up or down depending on your fasting blood sugar before breakfast.    Novolog will follow this pre meal schedule; Correction factor of "2 units per 50 mg/dl" and is based on 30-50 grams of carbohydrates per meal.    If blood sugar is below 70 eat first then check your blood sugar 2 hours later and make correction.  If blood pre-meal sugar is  70 -150 take 14 units of Novolog;  If blood pre-meal sugar is 151-200 take +2 units of Novolog;  If blood pre-meal sugar is 201-250 take +4 units of Novolog;  If blood pre-meal sugar is 251-300 take +6 units of Novolog;  If blood pre-meal sugar is 301-350 take +8 units of Novolog;  If blood pre-meal sugar is 351-400+ take +10 units of Novolog;  Also increase water intake and call for appointment.  "

## 2017-07-06 ENCOUNTER — TELEPHONE (OUTPATIENT)
Dept: OBSTETRICS AND GYNECOLOGY | Facility: CLINIC | Age: 37
End: 2017-07-06

## 2017-07-06 NOTE — TELEPHONE ENCOUNTER
----- Message from Siomara Sainz sent at 7/6/2017  1:53 PM CDT -----  Contact: Pt   States she is calling to schedule her depo injection and can be reached at [phone]//[email or web address]

## 2017-07-13 ENCOUNTER — TELEPHONE (OUTPATIENT)
Dept: OBSTETRICS AND GYNECOLOGY | Facility: CLINIC | Age: 37
End: 2017-07-13

## 2017-07-13 NOTE — TELEPHONE ENCOUNTER
----- Message from Coco Harris sent at 7/13/2017  1:08 PM CDT -----  Contact: pt  She's calling to reschedule her nurse visit for today, please advise [phone]

## 2017-07-14 ENCOUNTER — CLINICAL SUPPORT (OUTPATIENT)
Dept: OBSTETRICS AND GYNECOLOGY | Facility: CLINIC | Age: 37
End: 2017-07-14
Payer: COMMERCIAL

## 2017-07-14 DIAGNOSIS — Z30.9 ENCOUNTER FOR CONTRACEPTIVE MANAGEMENT, UNSPECIFIED TYPE: Primary | ICD-10-CM

## 2017-07-14 PROCEDURE — 96372 THER/PROPH/DIAG INJ SC/IM: CPT | Mod: S$GLB,,, | Performed by: NURSE PRACTITIONER

## 2017-07-14 RX ADMIN — MEDROXYPROGESTERONE ACETATE 150 MG: 150 INJECTION, SUSPENSION INTRAMUSCULAR at 03:07

## 2017-07-14 NOTE — PROGRESS NOTES
Identified patient using two patient identifiers. Allergies verified with patient. Okay to give injection per TODD Carranza CNM. Depo Provera 150mg IM injection given in right ventrogluteal. Patient tolerated well and was advised to remain in the office for 15 minutes. Patient verbalized understanding. Next injection due between 09/29/17 and 10/13/17. Appointment scheduled and patient is aware of appointment date, time and location.

## 2017-08-21 ENCOUNTER — OFFICE VISIT (OUTPATIENT)
Dept: INTERNAL MEDICINE | Facility: CLINIC | Age: 37
End: 2017-08-21
Payer: COMMERCIAL

## 2017-08-21 VITALS
TEMPERATURE: 97 F | BODY MASS INDEX: 41.42 KG/M2 | OXYGEN SATURATION: 99 % | SYSTOLIC BLOOD PRESSURE: 114 MMHG | HEART RATE: 76 BPM | DIASTOLIC BLOOD PRESSURE: 78 MMHG | HEIGHT: 62 IN | WEIGHT: 225.06 LBS

## 2017-08-21 DIAGNOSIS — F51.05 INSOMNIA SECONDARY TO ANXIETY: ICD-10-CM

## 2017-08-21 DIAGNOSIS — Z00.00 ROUTINE GENERAL MEDICAL EXAMINATION AT A HEALTH CARE FACILITY: Primary | ICD-10-CM

## 2017-08-21 DIAGNOSIS — E78.5 HYPERLIPIDEMIA ASSOCIATED WITH TYPE 2 DIABETES MELLITUS: Chronic | ICD-10-CM

## 2017-08-21 DIAGNOSIS — E66.01 MORBID OBESITY WITH BMI OF 40.0-44.9, ADULT: ICD-10-CM

## 2017-08-21 DIAGNOSIS — D64.9 ANEMIA, UNSPECIFIED TYPE: ICD-10-CM

## 2017-08-21 DIAGNOSIS — F41.9 INSOMNIA SECONDARY TO ANXIETY: ICD-10-CM

## 2017-08-21 DIAGNOSIS — I15.2 HYPERTENSION ASSOCIATED WITH DIABETES: Chronic | ICD-10-CM

## 2017-08-21 DIAGNOSIS — E11.59 HYPERTENSION ASSOCIATED WITH DIABETES: Chronic | ICD-10-CM

## 2017-08-21 DIAGNOSIS — E11.69 HYPERLIPIDEMIA ASSOCIATED WITH TYPE 2 DIABETES MELLITUS: Chronic | ICD-10-CM

## 2017-08-21 PROCEDURE — 99395 PREV VISIT EST AGE 18-39: CPT | Mod: S$GLB,,, | Performed by: FAMILY MEDICINE

## 2017-08-21 PROCEDURE — 99999 PR PBB SHADOW E&M-EST. PATIENT-LVL III: CPT | Mod: PBBFAC,,, | Performed by: FAMILY MEDICINE

## 2017-08-21 NOTE — PROGRESS NOTES
"Subjective:       Patient ID: Maikol Pacheco is a 37 y.o. female.    Chief Complaint: Annual Exam    37-year-old Afro-American female patient with Patient Active Problem List:     Hyperlipidemia associated with type 2 diabetes mellitus     Hypertension associated with diabetes     Anemia     Uncontrolled type 2 diabetes mellitus with diabetic polyneuropathy, with long-term current use of insulin     Morbid obesity with BMI of 40.0-44.9, adult  Here for routine annual physicals.   Patient has been taking her medications regularly, denies of any polyuria polydipsia polyphagia.  Reports that her sugars have been running in the range of high 100s lately.  Denies of any hypoglycemic episodes  Denies of any chest pain or shortness of breath, tingling or numbness sensation to extremities.  Does not need any refills at this time  Has been followed by diabetes clinic closely          Review of Systems   Constitutional: Negative for fatigue.   Eyes: Negative for visual disturbance.   Respiratory: Negative for shortness of breath.    Cardiovascular: Negative for chest pain and leg swelling.   Gastrointestinal: Negative for abdominal pain, nausea and vomiting.   Endocrine: Negative for polydipsia, polyphagia and polyuria.   Musculoskeletal: Negative for myalgias.   Skin: Negative for rash.   Neurological: Negative for weakness, light-headedness, numbness and headaches.   Psychiatric/Behavioral: Negative for sleep disturbance.         /78   Pulse 76   Temp 96.6 °F (35.9 °C) (Tympanic)   Ht 5' 2" (1.575 m)   Wt 102.1 kg (225 lb 1.4 oz)   SpO2 99%   BMI 41.17 kg/m²   Objective:      Physical Exam   Constitutional: She is oriented to person, place, and time. She appears well-developed and well-nourished.   HENT:   Head: Normocephalic and atraumatic.   Mouth/Throat: Oropharynx is clear and moist.   Cardiovascular: Normal rate, regular rhythm and normal heart sounds.    No murmur heard.  Pulmonary/Chest: Effort " normal and breath sounds normal. She has no wheezes.   Abdominal: Soft. Bowel sounds are normal. There is no tenderness.   Musculoskeletal: She exhibits no edema or tenderness.   Neurological: She is alert and oriented to person, place, and time.   Skin: Skin is warm and dry. No rash noted.   Psychiatric: She has a normal mood and affect.         Assessment:       1. Routine general medical examination at a health care facility    2. Uncontrolled type 2 diabetes mellitus with diabetic polyneuropathy, with long-term current use of insulin    3. Hypertension associated with diabetes    4. Hyperlipidemia associated with type 2 diabetes mellitus    5. Morbid obesity with BMI of 40.0-44.9, adult    6. Anemia, unspecified type    7. Insomnia secondary to anxiety        Plan:   Routine general medical examination at a health care facility  -     CBC auto differential; Future; Expected date: 08/21/2017  -     Comprehensive metabolic panel; Future; Expected date: 08/21/2017  -     Lipid panel; Future; Expected date: 08/21/2017  -     TSH; Future; Expected date: 08/21/2017  Vital signs stable today.  Clinical exam stable.  Encouraged to maintain lifestyle modifications with low-fat and low-cholesterol diet and exercise 30 minutes daily    Uncontrolled type 2 diabetes mellitus with diabetic polyneuropathy, with long-term current use of insulin  -     Comprehensive metabolic panel; Future; Expected date: 08/21/2017  -     Lipid panel; Future; Expected date: 08/21/2017  -     Hemoglobin A1c; Future; Expected date: 08/21/2017  -     Microalbumin/creatinine urine ratio; Future; Expected date: 08/21/2017  Currently on Trulicity, Amaryl 4 mg, metformin 1000 milligrams twice daily, Levemir 30 units daily  Strict lifestyle changes recommended with 1800 ADA low-fat and low-cholesterol diet and exercise 30 minutes daily  Up-to-date with diabetic foot and eye exam    Hypertension associated with diabetes  -     Comprehensive metabolic  panel; Future; Expected date: 08/21/2017  -     Lipid panel; Future; Expected date: 08/21/2017  -     TSH; Future; Expected date: 08/21/2017  Blood pressure stable today currently on ramipril 5 mg daily    Hyperlipidemia associated with type 2 diabetes mellitus  -     Lipid panel; Future; Expected date: 08/21/2017  Currently on simvastatin 20 mg daily    Morbid obesity with BMI of 40.0-44.9, adult-lifestyle modifications recommended to lose weight with BMI 41    Anemia, unspecified type  -     CBC auto differential; Future; Expected date: 08/21/2017  Stable and asymptomatic currently taking iron supplements daily    Insomnia secondary to anxiety-taking trazodone as needed

## 2017-08-24 ENCOUNTER — LAB VISIT (OUTPATIENT)
Dept: LAB | Facility: HOSPITAL | Age: 37
End: 2017-08-24
Attending: FAMILY MEDICINE
Payer: COMMERCIAL

## 2017-08-24 DIAGNOSIS — I15.2 HYPERTENSION ASSOCIATED WITH DIABETES: Chronic | ICD-10-CM

## 2017-08-24 DIAGNOSIS — E11.59 HYPERTENSION ASSOCIATED WITH DIABETES: Chronic | ICD-10-CM

## 2017-08-24 DIAGNOSIS — D64.9 ANEMIA, UNSPECIFIED TYPE: ICD-10-CM

## 2017-08-24 DIAGNOSIS — E11.69 HYPERLIPIDEMIA ASSOCIATED WITH TYPE 2 DIABETES MELLITUS: Chronic | ICD-10-CM

## 2017-08-24 DIAGNOSIS — Z00.00 ROUTINE GENERAL MEDICAL EXAMINATION AT A HEALTH CARE FACILITY: ICD-10-CM

## 2017-08-24 DIAGNOSIS — E78.5 HYPERLIPIDEMIA ASSOCIATED WITH TYPE 2 DIABETES MELLITUS: Chronic | ICD-10-CM

## 2017-08-24 LAB
ALBUMIN SERPL BCP-MCNC: 2.8 G/DL
ALP SERPL-CCNC: 89 U/L
ALT SERPL W/O P-5'-P-CCNC: 9 U/L
ANION GAP SERPL CALC-SCNC: 8 MMOL/L
AST SERPL-CCNC: 13 U/L
BASOPHILS # BLD AUTO: 0.02 K/UL
BASOPHILS NFR BLD: 0.3 %
BILIRUB SERPL-MCNC: 0.3 MG/DL
BUN SERPL-MCNC: 11 MG/DL
CALCIUM SERPL-MCNC: 8.9 MG/DL
CHLORIDE SERPL-SCNC: 104 MMOL/L
CHOLEST/HDLC SERPL: 3.3 {RATIO}
CO2 SERPL-SCNC: 23 MMOL/L
CREAT SERPL-MCNC: 0.8 MG/DL
DIFFERENTIAL METHOD: ABNORMAL
EOSINOPHIL # BLD AUTO: 0.2 K/UL
EOSINOPHIL NFR BLD: 2.8 %
ERYTHROCYTE [DISTWIDTH] IN BLOOD BY AUTOMATED COUNT: 15.9 %
EST. GFR  (AFRICAN AMERICAN): >60 ML/MIN/1.73 M^2
EST. GFR  (NON AFRICAN AMERICAN): >60 ML/MIN/1.73 M^2
GLUCOSE SERPL-MCNC: 265 MG/DL
HCT VFR BLD AUTO: 32.3 %
HDL/CHOLESTEROL RATIO: 30.1 %
HDLC SERPL-MCNC: 143 MG/DL
HDLC SERPL-MCNC: 43 MG/DL
HGB BLD-MCNC: 10.9 G/DL
LDLC SERPL CALC-MCNC: 86.2 MG/DL
LYMPHOCYTES # BLD AUTO: 3.3 K/UL
LYMPHOCYTES NFR BLD: 49 %
MCH RBC QN AUTO: 26.1 PG
MCHC RBC AUTO-ENTMCNC: 33.7 G/DL
MCV RBC AUTO: 78 FL
MONOCYTES # BLD AUTO: 0.4 K/UL
MONOCYTES NFR BLD: 5.4 %
NEUTROPHILS # BLD AUTO: 2.9 K/UL
NEUTROPHILS NFR BLD: 42.2 %
NONHDLC SERPL-MCNC: 100 MG/DL
PLATELET # BLD AUTO: 384 K/UL
PMV BLD AUTO: 10 FL
POTASSIUM SERPL-SCNC: 4.1 MMOL/L
PROT SERPL-MCNC: 8 G/DL
RBC # BLD AUTO: 4.17 M/UL
SODIUM SERPL-SCNC: 135 MMOL/L
TRIGL SERPL-MCNC: 69 MG/DL
TSH SERPL DL<=0.005 MIU/L-ACNC: 0.53 UIU/ML
WBC # BLD AUTO: 6.81 K/UL

## 2017-08-24 PROCEDURE — 85025 COMPLETE CBC W/AUTO DIFF WBC: CPT

## 2017-08-24 PROCEDURE — 84443 ASSAY THYROID STIM HORMONE: CPT

## 2017-08-24 PROCEDURE — 36415 COLL VENOUS BLD VENIPUNCTURE: CPT | Mod: PO

## 2017-08-24 PROCEDURE — 80061 LIPID PANEL: CPT

## 2017-08-24 PROCEDURE — 80053 COMPREHEN METABOLIC PANEL: CPT

## 2017-08-24 PROCEDURE — 83036 HEMOGLOBIN GLYCOSYLATED A1C: CPT

## 2017-08-25 LAB
ESTIMATED AVG GLUCOSE: 249 MG/DL
HBA1C MFR BLD HPLC: 10.3 %

## 2017-09-28 ENCOUNTER — OFFICE VISIT (OUTPATIENT)
Dept: DIABETES | Facility: CLINIC | Age: 37
End: 2017-09-28
Payer: COMMERCIAL

## 2017-09-28 ENCOUNTER — NUTRITION (OUTPATIENT)
Dept: DIABETES | Facility: CLINIC | Age: 37
End: 2017-09-28
Payer: COMMERCIAL

## 2017-09-28 VITALS
HEIGHT: 62 IN | DIASTOLIC BLOOD PRESSURE: 80 MMHG | BODY MASS INDEX: 42.68 KG/M2 | SYSTOLIC BLOOD PRESSURE: 120 MMHG | WEIGHT: 231.94 LBS

## 2017-09-28 VITALS — BODY MASS INDEX: 42.68 KG/M2 | HEIGHT: 62 IN | WEIGHT: 231.94 LBS

## 2017-09-28 DIAGNOSIS — E11.69 HYPERLIPIDEMIA ASSOCIATED WITH TYPE 2 DIABETES MELLITUS: Chronic | ICD-10-CM

## 2017-09-28 DIAGNOSIS — E11.59 HYPERTENSION ASSOCIATED WITH DIABETES: Chronic | ICD-10-CM

## 2017-09-28 DIAGNOSIS — D64.9 ANEMIA, UNSPECIFIED TYPE: ICD-10-CM

## 2017-09-28 DIAGNOSIS — E66.01 MORBID OBESITY WITH BMI OF 40.0-44.9, ADULT: ICD-10-CM

## 2017-09-28 DIAGNOSIS — I15.2 HYPERTENSION ASSOCIATED WITH DIABETES: Chronic | ICD-10-CM

## 2017-09-28 DIAGNOSIS — E78.5 HYPERLIPIDEMIA ASSOCIATED WITH TYPE 2 DIABETES MELLITUS: Chronic | ICD-10-CM

## 2017-09-28 LAB — GLUCOSE SERPL-MCNC: 207 MG/DL (ref 70–110)

## 2017-09-28 PROCEDURE — 99214 OFFICE O/P EST MOD 30 MIN: CPT | Mod: S$GLB,,, | Performed by: PHYSICIAN ASSISTANT

## 2017-09-28 PROCEDURE — G0108 DIAB MANAGE TRN  PER INDIV: HCPCS | Mod: S$GLB,,, | Performed by: DIETITIAN, REGISTERED

## 2017-09-28 PROCEDURE — 3079F DIAST BP 80-89 MM HG: CPT | Mod: S$GLB,,, | Performed by: PHYSICIAN ASSISTANT

## 2017-09-28 PROCEDURE — 99999 PR PBB SHADOW E&M-EST. PATIENT-LVL III: CPT | Mod: PBBFAC,,, | Performed by: PHYSICIAN ASSISTANT

## 2017-09-28 PROCEDURE — 3074F SYST BP LT 130 MM HG: CPT | Mod: S$GLB,,, | Performed by: PHYSICIAN ASSISTANT

## 2017-09-28 PROCEDURE — 82948 REAGENT STRIP/BLOOD GLUCOSE: CPT | Mod: S$GLB,,, | Performed by: PHYSICIAN ASSISTANT

## 2017-09-28 PROCEDURE — 3008F BODY MASS INDEX DOCD: CPT | Mod: S$GLB,,, | Performed by: PHYSICIAN ASSISTANT

## 2017-09-28 PROCEDURE — 99999 PR PBB SHADOW E&M-EST. PATIENT-LVL III: CPT | Mod: PBBFAC,,, | Performed by: DIETITIAN, REGISTERED

## 2017-09-28 RX ORDER — PIOGLITAZONEHYDROCHLORIDE 15 MG/1
15 TABLET ORAL DAILY
Qty: 30 TABLET | Refills: 11 | Status: SHIPPED | OUTPATIENT
Start: 2017-09-28 | End: 2019-05-29 | Stop reason: SDUPTHER

## 2017-09-28 RX ORDER — GLIMEPIRIDE 4 MG/1
8 TABLET ORAL DAILY
Qty: 180 TABLET | Refills: 3
Start: 2017-09-28 | End: 2018-03-14 | Stop reason: SDUPTHER

## 2017-09-28 RX ORDER — METFORMIN HYDROCHLORIDE 1000 MG/1
1000 TABLET, FILM COATED, EXTENDED RELEASE ORAL 2 TIMES DAILY WITH MEALS
Status: CANCELLED
Start: 2017-09-28

## 2017-09-28 RX ORDER — SIMVASTATIN 20 MG/1
20 TABLET, FILM COATED ORAL NIGHTLY
Qty: 30 TABLET | Refills: 6
Start: 2017-09-28 | End: 2018-03-14 | Stop reason: SDUPTHER

## 2017-09-28 RX ORDER — INSULIN ASPART 100 [IU]/ML
INJECTION, SOLUTION INTRAVENOUS; SUBCUTANEOUS
Qty: 21.6 ML | Refills: 11
Start: 2017-09-28 | End: 2018-11-06 | Stop reason: SDUPTHER

## 2017-09-28 RX ORDER — RAMIPRIL 5 MG/1
5 CAPSULE ORAL DAILY
Qty: 30 CAPSULE | Refills: 6
Start: 2017-09-28 | End: 2018-03-14 | Stop reason: SDUPTHER

## 2017-09-28 NOTE — PATIENT INSTRUCTIONS
"I have reviewed your results and they are still quite high. I would like you to start "Treating to Target". The treatment will be Insulin and your target will be the Fasting and 2 hour post meal blood sugar. It will work in this manner;     1. Goal for Fasting blood sugar is  mg/dl. I realize that you will need time to adjust to the new levels and presently you may feel too low if you are too aggressive now. So go slow and aim to lower your blood sugar to below 200 then 150 then 100 over several months.     2. Goal for 2 hour post meal blood sugar is below 180 mg/dl, here the same rules apply as in #1.     3. You will check your fasting blood sugar daily, if not where we would like it to be over a 3 day period then that evening we will increase the Lantus dose by 5 units. Then repeat the process over the next 3 days. Remember this is a slow process and take our time getting to goal. But, each week should be better than prior weeks. Blood sugars below 70 are unacceptable and should raise a "RED FLAG" where we may have to reduce our dose of insulin.     4. You will check your post meal glucose daily as well. However, each day you will check a different meal, (ie. Monday-breakfast; Tuesday- lunch; Wednesday- supper, then repeat). If your post meal glucose is not where we would like, increase pre-meal insulin by 2 units next time. A word of CAUTION: mealtime insulin is dependant on the size and concentration of your meal content. If not consuming a large meal do not take large dose of insulin. Use the reasonable person rule.      5. If you have any questions please do not hesitate to call.     Intensive insulin Therapy with correction factor:     You are on Intensive insulin therapy with Basal and Bolus insulin. Lantus, Levamir or NPH is your Basal insulin and will help maintain your fasting and between meal sugar. Your fast acting or rescue insulin is either Humalog, Novalog or Regular insulin and will control " "your post meal sugar.      You will Take 15 units 5 AM  35 units of Levemir at 5 PM each night. This will be adjusted up or down depending on your fasting blood sugar before breakfast.     Novolog will follow this pre meal schedule; Correction factor of "2 units per 50 mg/dl" and is based on 30-50 grams of carbohydrates per meal.     If blood sugar is below 70 eat first then check your blood sugar 2 hours later and make correction.  If blood pre-meal sugar is  70 -150 take 16 units of Novolog;  If blood pre-meal sugar is 151-200 take +2 units of Novolog;  If blood pre-meal sugar is 201-250 take +4 units of Novolog;  If blood pre-meal sugar is 251-300 take +6 units of Novolog;  If blood pre-meal sugar is 301-350 take +8 units of Novolog;  If blood pre-meal sugar is 351-400+ take +10 units of Novolog;  Also increase water intake and call for appointment.    "

## 2017-09-28 NOTE — PROGRESS NOTES
Diabetes Education  Author: Jesika Weber RD, CDE  Date: 9/28/2017    Diabetes Education Visit  Diabetes Education Record Assessment/Progress: Comprehensive/Group    Diabetes Type  Diabetes Type : Type II     Nutrition  Meal Planning:  (~2600 cals/d. Pt is working to reduce fast foods and carb portions. She may be candidate for advanced carb counting at next visit.)  Meal Plan 24 Hour Recall - Breakfast: premier protein shake  Meal Plan 24 Hour Recall - Lunch: cucumber, tomato and pork chop (occs rice/gravy)  Meal Plan 24 Hour Recall - Dinner: lunchable OR sandwich (turkey, cheese)- water, diet cola   Meal Plan 24 Hour Recall - Snack: pop-chips    Monitoring   Self Monitoring : Per glucometer review, fst bg 174, most 223-344; acl (irregular testing) 154-261; acd - no testing.     Blood Glucose Logs: Yes    Exercise   Exercise Type:  (walks 60min 3d/wk)    Current Diabetes Treatment   Current Treatment: Oral Medication, Diet, Injectable, Exercise, Insulin (trulicity 1.5 mg weekly, amaryl 4 mg 1 tab twice daily, metformin 1000mg 1 tab twice daily (pt not using due GI side effect), levemir 35 units at noon, novolog 24 units ac per s/s (not fully using acd due to no BG testing))    Social History  Preferred Learning Method: Face to Face  Primary Support: Self     Barriers to Change  Barriers to Change: None  Learning Challenges : None    Readiness to Learn   Readiness to Learn : Eager    Cultural Influences  Cultural Influences: No    Diabetes Education Assessment/Progress  Acute Complications (preventing, detecting, and treating acute complications): Discussion, Competent (verbalizes/demonstrates), Written Materials Provided, Individual Session  Chronic Complications (preventing, detecting, and treating chronic complications): Discussion, Individual Session, Competent (verbalizes/demonstrates)  Diabetes Disease Process (diabetes disease process and treatment options): Discussion, Competent  (verbalizes/demonstrates), Individual Session  Nutrition (Incorporating nutritional management into one's lifestyle): Discussion, Written Materials Provided, Competent (verbalizes/demonstrates), Individual Session (Continuing to work on food quality. Pt with chronic social stressors at work and tearful in session today. Will continue to support and build to advanced carb counting to progress IC ratio.)  Physical Activity (incorporating physical activity into one's lifestyle): Discussion, Competent (verbalizes/demonstrates), Individual Session  Medications (states correct name, dose, onset, peak, duration, side effects & timing of meds): Discussion, Written Materials Provided, Competent (verbalizes/demonstrates), Individual Session (Pt may benefit from changing to Actos for insulin sensitivity since she is not tolerating metformin. Also, levemir may be more effective dosed twice daily. Pt agrees to increase BG testing acd to receive full dose novolog. She will see Ciara today)  Monitoring (monitoring blood glucose/other parameters & using results): Discussion, Competent (verbalizes/demonstrates), Written Materials Provided, Individual Session (Reminded pt to bring meter/logbook to clinic.)  Goal Setting and Problem Solving (verbalizes behavior change strategies & sets realistic goals): Discussion, Competent (verbalizes/demonstrates), Individual Session, Written Materials Provided  Behavior Change (developing personal strategies to health & behavior change): Discussion, Comnpetent (verbalizes/demonstrates), Individual Session  Psychosocial Issues (developing personal srategies to address psychosocial concerns): Competent (verbalizes/demonstrates), Discussion, Individual Session    Goals  Healthy Eating: In Progress (use meal plan - custom menus (mexican style), avoid fast foods, use MR entrees/shakes)  Start Date: 09/28/17  Target Date: 11/28/17  Monitoring: In Progress (test bg fasting, acl, acd daily; bring  meter, records to clinic)  Start Date: 09/28/17  Target Date: 11/28/17  Medications: In Progress  Start Date: 09/28/17  Target Date: 11/28/17    Diabetes Self-Management Support Plan  Diabetes Learning:  (RD/CDE via mychart and 2mos fu)  Review Status: Patient has selected and agrees to support plan.    Diabetes Care Plan/Intervention  Education Plan/Intervention: Individual Follow-Up DSMT, Endocrine Provider Visit Set Up (Maintain visits w/ Ciara)    Diabetes Meal Plan  Restrictions: Low Fat, Low Sodium  Calories: 1200, 1400  Carbohydrate Per Meal: 20-30g, 30-45g  Carbohydrate Per Snack : 15-20g, 7-15g    Education Units of Time   Time Spent: 30 min      Health Maintenance Topics with due status: Not Due       Topic Last Completion Date    Pneumococcal PPSV23 (Medium Risk) 01/14/2014    TETANUS VACCINE 04/11/2014    Pap Smear with HPV Cotest 12/09/2016    Eye Exam 01/19/2017    Foot Exam 06/15/2017    Lipid Panel 08/24/2017    Hemoglobin A1c 08/24/2017     Health Maintenance Due   Topic Date Due    Influenza Vaccine  08/01/2017

## 2017-09-28 NOTE — LETTER
September 28, 2017        Maisha Bartholomew MD  [address] [place] [address]             [facility]  [address]  [place] [address]  Phone: [phone]  Fax: [phone]   Patient: Maikol Pacheco   MR Number: [id number]   YOB: 1980   Date of Visit: 9/28/2017       Dear Dr. Bartholomew:    Thank you for referring Maikol Pacheco to me for evaluation. Below are the relevant portions of my assessment and plan of care.     If you have questions, please do not hesitate to call me. I look forward to following Maikol along with you.    Sincerely,      Jesika Weber RD, CDE           CC  Brennan Granados Jr., PAELIJAH

## 2017-09-28 NOTE — PROGRESS NOTES
Subjective:      Patient ID: Maikol Pacheco is a 37 y.o. female.    PCP: Maisha Bartholomew MD      Maikol Pacheco is a pleasant 37 y.o. female presenting to follow up on diabetes mellitus. She has had diabetes for 6 or more years. Her last visit in Diabetes Management was 6/15/2017 Since that time she has had mild improvement in her glycemia but her hemoglobin A1c is still 10.3%. Her blood sugar range fasting has been 174-344 and 2 hour post meal has been 139-261, and she has been monitoring 2 times per day as directed. Her current concerns are glycemic control.    She denies any hospital admissions, emergency room visits, hypoglycemia, syncope, diaphoresis, chest pain, or dyspnea.    She has gained 6 pounds since last visit. Her BMI is 42.42    Her blood sugar in the clinic today was:   Lab Results   Component Value Date    POCGLU 207 (A) 09/28/2017       We discussed the American diabetes Association recommendations:  hemoglobin A1c below 7.0%; all diabetics should be on statins unless contraindicated; one aspirin daily unless contraindicated; fasting blood sugar between 80 and 130 mg/dL; postprandial blood sugar below 180 mg/dl; prevention of hypoglycemia, may adjust goals to higher levels if persistent; ACE or ARB therapy if not contraindicated; and maintain in an ideal body weight with BMI below 25.    Maikol is noncompliant some of the time with DM medications.     Maikol is noncompliant some of the time with lifestyle modifications to include activity and meal planning.       STANDARDS OF CARE:   Eye exam: Dr. Hyatt in 1/2017.  Dental exam: Has regular exams; denies gums bleeding.  Podiatry exam: None.  SOCIAL: Single. Lives with mom. Has no children. 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     ACTIVITY LEVEL: Walks 3 times weekly for 30 minutes, goes to gym 4-5 times weekly.  MEAL PLANNING: Number of meals per day - two. Number of snacks per day - one. Per dietary recall, patient is  not limiting carbohydrates, saturated fats and sodium. BLOOD GLUCOSE TESTING: Self-monitoring with [employer] and      The following results were reviewed with patient.    Lab Results   Component Value Date    WBC 6.81 08/24/2017    HGB 10.9 (L) 08/24/2017    HCT 32.3 (L) 08/24/2017     (H) 08/24/2017    CHOL 143 08/24/2017    TRIG 69 08/24/2017    HDL 43 08/24/2017    ALT 9 (L) 08/24/2017    AST 13 08/24/2017     (L) 08/24/2017    K 4.1 08/24/2017     08/24/2017    CREATININE 0.8 08/24/2017    ESTGFRAFRICA >60.0 08/24/2017    EGFRNONAA >60.0 08/24/2017    BUN 11 08/24/2017    CO2 23 08/24/2017    TSH 0.526 08/24/2017     (H) 08/24/2017       Lab Results   Component Value Date    HGBA1C 10.3 (H) 08/24/2017    HGBA1C 10.7 (H) 06/15/2017    HGBA1C 11.0 (H) 06/09/2017       Lab Results   Component Value Date    CPEPTIDE 2.2 12/29/2016     Lab Results   Component Value Date    TSH 0.526 08/24/2017     Lab Results   Component Value Date    IRON 42 05/23/2013    TIBC 297.0 05/23/2013    FERRITIN 45 09/22/2016     Lab Results   Component Value Date    EISISRYP07 873 05/23/2013     Lab Results   Component Value Date    CALCIUM 8.9 08/24/2017    PHOS 3.0 12/29/2016     Review of patient's allergies indicates:   Allergen Reactions    No known drug allergies        Past Medical History:   Diagnosis Date    Anemia     Diabetes mellitus type II 2008    BS- 400's last week    DM (diabetes mellitus) 2006     01/18/2017    Gastroparesis     Hyperlipidemia     Hypertension     Morbid obesity        Review of Systems   Constitutional: Negative.  Negative for activity change, appetite change, chills, diaphoresis, fatigue, fever and unexpected weight change.   HENT: Negative.  Negative for congestion, dental problem, drooling, ear discharge, ear pain, facial swelling, hearing loss, mouth sores, nosebleeds, postnasal drip, rhinorrhea, sinus pressure, sneezing, sore throat, tinnitus, trouble  swallowing and voice change.    Eyes: Negative.  Negative for photophobia, pain, discharge, redness, itching and visual disturbance.   Respiratory: Negative.  Negative for apnea, cough, choking, chest tightness, shortness of breath, wheezing and stridor.    Cardiovascular: Negative.  Negative for chest pain, palpitations and leg swelling.   Gastrointestinal: Negative.  Negative for abdominal distention, abdominal pain, anal bleeding, blood in stool, constipation, diarrhea, nausea, rectal pain and vomiting.   Endocrine: Negative.  Negative for cold intolerance, heat intolerance, polydipsia, polyphagia and polyuria.   Genitourinary: Negative.  Negative for decreased urine volume, difficulty urinating, dyspareunia, dysuria, enuresis, flank pain, frequency, genital sores, hematuria, menstrual problem, pelvic pain, urgency, vaginal bleeding, vaginal discharge and vaginal pain.   Musculoskeletal: Negative.  Negative for arthralgias, back pain, gait problem, joint swelling, myalgias, neck pain and neck stiffness.   Skin: Negative.  Negative for color change, pallor, rash and wound.   Allergic/Immunologic: Negative.  Negative for environmental allergies, food allergies and immunocompromised state.   Neurological: Negative.  Negative for dizziness, tremors, seizures, syncope, facial asymmetry, speech difficulty, weakness, light-headedness, numbness and headaches.   Hematological: Negative.  Negative for adenopathy. Does not bruise/bleed easily.   Psychiatric/Behavioral: Negative.  Negative for agitation, behavioral problems, confusion, decreased concentration, dysphoric mood, hallucinations, self-injury, sleep disturbance and suicidal ideas. The patient is not nervous/anxious and is not hyperactive.       Objective:     Vitals - 1 value per visit 6/9/2017 6/15/2017 8/21/2017   SYSTOLIC - 110 114   DIASTOLIC - 70 78   PULSE - - 76   TEMPERATURE - - 96.6   SPO2 - - 99   Weight (lb) 227.52 229.72 225.09   Weight (kg) 103.2  "104.2 102.1   HEIGHT 5' 2" 5' 2" 5' 2"   BODY MASS INDEX 41.61 42.02 41.17   VISIT REPORT - - -   Pain Score  0 0 0   Some recent data might be hidden       Physical Exam   Constitutional: She is oriented to person, place, and time. She appears well-developed and well-nourished. She is cooperative.  Non-toxic appearance. She does not have a sickly appearance. She does not appear ill. No distress. She is not intubated.   HENT:   Head: Normocephalic and atraumatic. Not macrocephalic and not microcephalic. Head is without raccoon's eyes, without Bains's sign, without abrasion, without contusion, without laceration, without right periorbital erythema and without left periorbital erythema. Hair is normal.   Right Ear: Hearing, tympanic membrane, external ear and ear canal normal. No lacerations. No drainage, swelling or tenderness. No foreign bodies. No mastoid tenderness. Tympanic membrane is not injected, not scarred, not perforated, not erythematous, not retracted and not bulging. Tympanic membrane mobility is normal. No middle ear effusion. No hemotympanum. No decreased hearing is noted.   Left Ear: Hearing, tympanic membrane, external ear and ear canal normal. No lacerations. No drainage, swelling or tenderness. No foreign bodies. No mastoid tenderness. Tympanic membrane is not injected, not scarred, not perforated, not erythematous, not retracted and not bulging. Tympanic membrane mobility is normal.  No middle ear effusion. No hemotympanum. No decreased hearing is noted.   Nose: Nose normal. No mucosal edema, rhinorrhea, nose lacerations, sinus tenderness, nasal deformity, septal deviation or nasal septal hematoma. No epistaxis.  No foreign bodies. Right sinus exhibits no maxillary sinus tenderness and no frontal sinus tenderness. Left sinus exhibits no maxillary sinus tenderness and no frontal sinus tenderness.   Mouth/Throat: Oropharynx is clear and moist. No oropharyngeal exudate.   Eyes: Conjunctivae and EOM " are normal. Pupils are equal, round, and reactive to light. Right eye exhibits no chemosis, no discharge and no exudate. No foreign body present in the right eye. Left eye exhibits no chemosis, no discharge, no exudate and no hordeolum. No foreign body present in the left eye. Right conjunctiva is not injected. Right conjunctiva has no hemorrhage. Left conjunctiva is not injected. Left conjunctiva has no hemorrhage. No scleral icterus. Right eye exhibits normal extraocular motion and no nystagmus. Left eye exhibits normal extraocular motion and no nystagmus. Right pupil is round and reactive. Left pupil is round and reactive. Pupils are equal.   Neck: Trachea normal, normal range of motion and full passive range of motion without pain. Neck supple. Normal carotid pulses, no hepatojugular reflux and no JVD present. No tracheal tenderness, no spinous process tenderness and no muscular tenderness present. Carotid bruit is not present. No neck rigidity. No tracheal deviation, no edema, no erythema and normal range of motion present. No thyroid mass and no thyromegaly present.   Cardiovascular: Normal rate, regular rhythm, normal heart sounds and intact distal pulses.   No extrasystoles are present. PMI is not displaced.  Exam reveals no gallop, no friction rub and no decreased pulses.    No murmur heard.  Pulses:       Dorsalis pedis pulses are 2+ on the right side, and 2+ on the left side.        Posterior tibial pulses are 2+ on the right side, and 2+ on the left side.   Pulmonary/Chest: Effort normal and breath sounds normal. No accessory muscle usage or stridor. No apnea, no tachypnea and no bradypnea. She is not intubated. No respiratory distress. She has no decreased breath sounds. She has no wheezes. She has no rhonchi. She has no rales. Chest wall is not dull to percussion. She exhibits no mass, no tenderness, no bony tenderness, no laceration, no crepitus, no edema, no deformity, no swelling and no retraction.    Abdominal: Soft. Normal appearance and bowel sounds are normal. She exhibits no shifting dullness, no distension, no pulsatile liver, no fluid wave, no abdominal bruit, no ascites, no pulsatile midline mass and no mass. There is no hepatosplenomegaly, splenomegaly or hepatomegaly. There is no tenderness. There is no rigidity, no rebound, no guarding, no CVA tenderness, no tenderness at McBurney's point and negative Doran's sign.   Musculoskeletal: Normal range of motion. She exhibits no edema or tenderness.        Right foot: There is normal range of motion and no deformity.        Left foot: There is normal range of motion and no deformity.   Feet:   Right Foot:   Protective Sensation: 5 sites tested. 5 sites sensed.   Skin Integrity: Negative for ulcer, blister, skin breakdown, erythema, warmth, callus or dry skin.   Left Foot:   Protective Sensation: 5 sites tested. 5 sites sensed.   Skin Integrity: Negative for ulcer, blister, skin breakdown, erythema, warmth, callus or dry skin.   Lymphadenopathy:        Head (right side): No submental, no submandibular, no tonsillar, no preauricular, no posterior auricular and no occipital adenopathy present.        Head (left side): No submental, no submandibular, no tonsillar, no preauricular, no posterior auricular and no occipital adenopathy present.     She has no cervical adenopathy.        Right cervical: No superficial cervical, no deep cervical and no posterior cervical adenopathy present.       Left cervical: No superficial cervical, no deep cervical and no posterior cervical adenopathy present.     She has no axillary adenopathy.   Neurological: She is alert and oriented to person, place, and time. She has normal reflexes. She is not disoriented. She displays no atrophy, no tremor and normal reflexes. No cranial nerve deficit or sensory deficit. She exhibits normal muscle tone. She displays no seizure activity. Coordination and gait normal.   Reflex Scores:        Bicep reflexes are 2+ on the right side and 2+ on the left side.       Brachioradialis reflexes are 2+ on the right side and 2+ on the left side.       Patellar reflexes are 2+ on the right side and 2+ on the left side.  Skin: Skin is warm and dry. No abrasion, no bruising, no burn, no ecchymosis, no laceration, no lesion, no petechiae, no purpura and no rash noted. Rash is not macular, not papular, not maculopapular, not nodular, not pustular, not vesicular and not urticarial. She is not diaphoretic. No cyanosis or erythema. No pallor. Nails show no clubbing.   Psychiatric: She has a normal mood and affect. Her behavior is normal. Judgment and thought content normal. Her mood appears not anxious. Her affect is not angry, not blunt and not labile. Her speech is not rapid and/or pressured, not delayed, not tangential and not slurred. She is not agitated, not aggressive, not hyperactive, not slowed, not withdrawn, not actively hallucinating and not combative. Thought content is not paranoid and not delusional. Cognition and memory are not impaired. She does not express impulsivity or inappropriate judgment. She does not exhibit a depressed mood. She expresses no homicidal and no suicidal ideation. She expresses no suicidal plans and no homicidal plans. She is communicative. She exhibits normal recent memory and normal remote memory. She is attentive.   Nursing note and vitals reviewed.    Assessment:     1. Uncontrolled type 2 diabetes mellitus with diabetic polyneuropathy, with long-term current use of insulin    2. Hyperlipidemia associated with type 2 diabetes mellitus    3. Hypertension associated with diabetes    4. Morbid obesity with BMI of 40.0-44.9, adult    5. Anemia, unspecified type       Plan:     Maikol Pacheco is seen today for   1. Uncontrolled type 2 diabetes mellitus with diabetic polyneuropathy, with long-term current use of insulin    2. Hyperlipidemia associated with type 2 diabetes mellitus     3. Hypertension associated with diabetes    4. Morbid obesity with BMI of 40.0-44.9, adult    5. Anemia, unspecified type              We have discussed the etiology and treatment options associated with the diagnosis as well as alternatives.  She had seen Mrs. Weber earlier for her medical nutritional therapy and at that visit had her Levemir split into 2 doses.  She also was encouraged to have better choices at mealtime.  We discussed carbohydrate counting, compliance with medical regimen, and complications of diabetes.  I did intensify her insulin dose regimen and she was given a copy.  Additionally she was not taking the Glumetza therefore it was discontinued and patient was started on low dose Actos 15 mg nightly.  She has elected the following treatments.     Uncontrolled type 2 diabetes mellitus with diabetic polyneuropathy, with long-term current use of insulin  -     Stop metformin (GLUMETZA) 1000 MG (MOD) 24 hr tablet; Take 1 tablet (1,000 mg total) by mouth 2 (two) times daily with meals.  -     insulin detemir (LEVEMIR FLEXTOUCH) 100 unit/mL (3 mL) SubQ InPn pen; Inject 30 Units into the skin every evening.  Dispense: 27 mL; Refill: 3  -     insulin aspart (NOVOLOG) 100 unit/mL InPn pen; Titrate up to 24 units subcutaneously three times a day 10-15 min before meals as directed in after visit summary.  Dispense: 21.6 mL; Refill: 11  -     glimepiride (AMARYL) 4 MG tablet; Take 2 tablets (8 mg total) by mouth once daily.  Dispense: 180 tablet; Refill: 3  -     dulaglutide 1.5 mg/0.5 mL PnIj; Inject 1.5 mg into the skin every 7 days.  Dispense: 4 Syringe; Refill: 11  - Pioglitazone (Actos) 15 mg PO QHS for insulin sensativity    Hyperlipidemia associated with type 2 diabetes mellitus  -     simvastatin (ZOCOR) 20 MG tablet; Take 1 tablet (20 mg total) by mouth every evening. Generic ok  Dispense: 30 tablet; Refill: 6    Hypertension associated with diabetes  -     ramipril (ALTACE) 5 MG capsule; Take 1  "capsule (5 mg total) by mouth once daily.  Dispense: 30 capsule; Refill: 6    Morbid obesity with BMI of 40.0-44.9, adult    Anemia, unspecified type    1.) Patient was instructed to monitor blood glucose twice daily, fasting, and 2 hour post meal; if on Multiple Daily Injections (MDI) she will need to have pre-meal blood glucose as well. Reminded to bring BG meter or record to each visit for review.  2.) Reviewed pathophysiology of diabetes, complications related to the disease, importance of annual dilated eye exam and self daily foot examination.  3.) Continue medications as prescribed MDI with Lantus and Novolog; Glimepiride; Metformin; Trulicity. [facility] MyChart or Phone review in 1 week with BG records for adjustment of medication.  4.) Advised patient to continue to follow up with Dietician/CDE as directed.  5.) Discussed activity, benefits, methods, and precautions. Recommended patient start/continue some form of exercise and increase as tolerated to 60 minutes per day to facilitate weight loss and aid in control of BGs. Also reminded patient of WHO recommendation of 10,000 steps daily as a goal.   6.) A1C, TSH, Lipid Panel, CMP/renal panel with eGFR and Micro/Creatinine.  7.) Return to clinic in 2 months for follow up. Advised patient to call clinic with any questions or concerns.    I have reviewed your results and they are still quite high. I would like you to start "Treating to Target". The treatment will be Insulin and your target will be the Fasting and 2 hour post meal blood sugar. It will work in this manner;     1. Goal for Fasting blood sugar is  mg/dl. I realize that you will need time to adjust to the new levels and presently you may feel too low if you are too aggressive now. So go slow and aim to lower your blood sugar to below 200 then 150 then 100 over several months.     2. Goal for 2 hour post meal blood sugar is below 180 mg/dl, here the same rules apply as in #1.     3. You will " "check your fasting blood sugar daily, if not where we would like it to be over a 3 day period then that evening we will increase the Lantus dose by 5 units. Then repeat the process over the next 3 days. Remember this is a slow process and take our time getting to goal. But, each week should be better than prior weeks. Blood sugars below 70 are unacceptable and should raise a "RED FLAG" where we may have to reduce our dose of insulin.     4. You will check your post meal glucose daily as well. However, each day you will check a different meal, (ie. Monday-breakfast; Tuesday- lunch; Wednesday- supper, then repeat). If your post meal glucose is not where we would like, increase pre-meal insulin by 2 units next time. A word of CAUTION: mealtime insulin is dependant on the size and concentration of your meal content. If not consuming a large meal do not take large dose of insulin. Use the reasonable person rule.      5. If you have any questions please do not hesitate to call.     Intensive insulin Therapy with correction factor:     You are on Intensive insulin therapy with Basal and Bolus insulin. Lantus, Levamir or NPH is your Basal insulin and will help maintain your fasting and between meal sugar. Your fast acting or rescue insulin is either Humalog, Novalog or Regular insulin and will control your post meal sugar.      You will Take 15 units 5 AM  35 units of Levemir at 5 PM each night. This will be adjusted up or down depending on your fasting blood sugar before breakfast.     Novolog will follow this pre meal schedule; Correction factor of "2 units per 50 mg/dl" and is based on 30-50 grams of carbohydrates per meal.     If blood sugar is below 70 eat first then check your blood sugar 2 hours later and make correction.  If blood pre-meal sugar is  70 -150 take 16 units of Novolog;  If blood pre-meal sugar is 151-200 take +2 units of Novolog;  If blood pre-meal sugar is 201-250 take +4 units of Novolog;  If blood " pre-meal sugar is 251-300 take +6 units of Novolog;  If blood pre-meal sugar is 301-350 take +8 units of Novolog;  If blood pre-meal sugar is 351-400+ take +10 units of Novolog;  Also increase water intake and call for appointment.    A total of 40 minutes was spent in face to face time, of which 50 % was spent in counseling patient on disease process, complications, treatment, and side effects of medications.    The patient was explained the above plan and given opportunity to ask questions.  She understands, chooses and consents to this plan and accepts all the risks, which include but are not limited to the risks mentioned above.   She understands the alternative of having no testing, interventions or treatments at this time. She left content and without further questions.

## 2017-10-02 ENCOUNTER — OFFICE VISIT (OUTPATIENT)
Dept: INTERNAL MEDICINE | Facility: CLINIC | Age: 37
End: 2017-10-02
Payer: COMMERCIAL

## 2017-10-02 ENCOUNTER — CLINICAL SUPPORT (OUTPATIENT)
Dept: CARDIOLOGY | Facility: CLINIC | Age: 37
End: 2017-10-02
Payer: COMMERCIAL

## 2017-10-02 ENCOUNTER — LAB VISIT (OUTPATIENT)
Dept: LAB | Facility: HOSPITAL | Age: 37
End: 2017-10-02
Attending: FAMILY MEDICINE
Payer: COMMERCIAL

## 2017-10-02 VITALS
WEIGHT: 234.13 LBS | HEIGHT: 62 IN | HEART RATE: 111 BPM | TEMPERATURE: 96 F | SYSTOLIC BLOOD PRESSURE: 120 MMHG | DIASTOLIC BLOOD PRESSURE: 82 MMHG | BODY MASS INDEX: 43.08 KG/M2 | OXYGEN SATURATION: 99 %

## 2017-10-02 DIAGNOSIS — R42 DIZZINESS: ICD-10-CM

## 2017-10-02 DIAGNOSIS — E78.5 HYPERLIPIDEMIA ASSOCIATED WITH TYPE 2 DIABETES MELLITUS: Chronic | ICD-10-CM

## 2017-10-02 DIAGNOSIS — E11.59 HYPERTENSION ASSOCIATED WITH DIABETES: Chronic | ICD-10-CM

## 2017-10-02 DIAGNOSIS — E11.69 HYPERLIPIDEMIA ASSOCIATED WITH TYPE 2 DIABETES MELLITUS: Chronic | ICD-10-CM

## 2017-10-02 DIAGNOSIS — R20.0 NUMBNESS AND TINGLING IN BOTH HANDS: ICD-10-CM

## 2017-10-02 DIAGNOSIS — D50.9 IRON DEFICIENCY ANEMIA, UNSPECIFIED IRON DEFICIENCY ANEMIA TYPE: ICD-10-CM

## 2017-10-02 DIAGNOSIS — E66.01 MORBID OBESITY WITH BMI OF 40.0-44.9, ADULT: ICD-10-CM

## 2017-10-02 DIAGNOSIS — I15.2 HYPERTENSION ASSOCIATED WITH DIABETES: Chronic | ICD-10-CM

## 2017-10-02 DIAGNOSIS — R20.2 NUMBNESS AND TINGLING IN BOTH HANDS: ICD-10-CM

## 2017-10-02 DIAGNOSIS — R42 DIZZINESS: Primary | ICD-10-CM

## 2017-10-02 LAB
ANION GAP SERPL CALC-SCNC: 11 MMOL/L
BASOPHILS # BLD AUTO: 0.02 K/UL
BASOPHILS NFR BLD: 0.2 %
BUN SERPL-MCNC: 11 MG/DL
CALCIUM SERPL-MCNC: 9.3 MG/DL
CHLORIDE SERPL-SCNC: 107 MMOL/L
CO2 SERPL-SCNC: 22 MMOL/L
CREAT SERPL-MCNC: 0.8 MG/DL
DIFFERENTIAL METHOD: ABNORMAL
EOSINOPHIL # BLD AUTO: 0.1 K/UL
EOSINOPHIL NFR BLD: 1.1 %
ERYTHROCYTE [DISTWIDTH] IN BLOOD BY AUTOMATED COUNT: 16.3 %
EST. GFR  (AFRICAN AMERICAN): >60 ML/MIN/1.73 M^2
EST. GFR  (NON AFRICAN AMERICAN): >60 ML/MIN/1.73 M^2
FERRITIN SERPL-MCNC: 34 NG/ML
GLUCOSE SERPL-MCNC: 81 MG/DL
HCT VFR BLD AUTO: 36.3 %
HGB BLD-MCNC: 12 G/DL
LYMPHOCYTES # BLD AUTO: 3.5 K/UL
LYMPHOCYTES NFR BLD: 40.4 %
MCH RBC QN AUTO: 26.5 PG
MCHC RBC AUTO-ENTMCNC: 33.1 G/DL
MCV RBC AUTO: 80 FL
MONOCYTES # BLD AUTO: 0.6 K/UL
MONOCYTES NFR BLD: 6.4 %
NEUTROPHILS # BLD AUTO: 4.4 K/UL
NEUTROPHILS NFR BLD: 51.7 %
PLATELET # BLD AUTO: 409 K/UL
PMV BLD AUTO: 10.7 FL
POTASSIUM SERPL-SCNC: 3.7 MMOL/L
RBC # BLD AUTO: 4.52 M/UL
SODIUM SERPL-SCNC: 140 MMOL/L
VIT B12 SERPL-MCNC: 783 PG/ML
WBC # BLD AUTO: 8.57 K/UL

## 2017-10-02 PROCEDURE — 80048 BASIC METABOLIC PNL TOTAL CA: CPT

## 2017-10-02 PROCEDURE — 85025 COMPLETE CBC W/AUTO DIFF WBC: CPT

## 2017-10-02 PROCEDURE — 90686 IIV4 VACC NO PRSV 0.5 ML IM: CPT | Mod: S$GLB,,, | Performed by: FAMILY MEDICINE

## 2017-10-02 PROCEDURE — 82728 ASSAY OF FERRITIN: CPT

## 2017-10-02 PROCEDURE — 3074F SYST BP LT 130 MM HG: CPT | Mod: S$GLB,,, | Performed by: FAMILY MEDICINE

## 2017-10-02 PROCEDURE — 3008F BODY MASS INDEX DOCD: CPT | Mod: S$GLB,,, | Performed by: FAMILY MEDICINE

## 2017-10-02 PROCEDURE — 82607 VITAMIN B-12: CPT

## 2017-10-02 PROCEDURE — 99214 OFFICE O/P EST MOD 30 MIN: CPT | Mod: 25,S$GLB,, | Performed by: FAMILY MEDICINE

## 2017-10-02 PROCEDURE — 90471 IMMUNIZATION ADMIN: CPT | Mod: S$GLB,,, | Performed by: FAMILY MEDICINE

## 2017-10-02 PROCEDURE — 93000 ELECTROCARDIOGRAM COMPLETE: CPT | Mod: S$GLB,,, | Performed by: INTERNAL MEDICINE

## 2017-10-02 PROCEDURE — 3079F DIAST BP 80-89 MM HG: CPT | Mod: S$GLB,,, | Performed by: FAMILY MEDICINE

## 2017-10-02 PROCEDURE — 99999 PR PBB SHADOW E&M-EST. PATIENT-LVL III: CPT | Mod: PBBFAC,,, | Performed by: FAMILY MEDICINE

## 2017-10-02 PROCEDURE — 36415 COLL VENOUS BLD VENIPUNCTURE: CPT | Mod: PO

## 2017-10-02 RX ORDER — GABAPENTIN 100 MG/1
100 CAPSULE ORAL NIGHTLY
Qty: 30 CAPSULE | Refills: 1 | Status: SHIPPED | OUTPATIENT
Start: 2017-10-02 | End: 2018-03-26 | Stop reason: DRUGHIGH

## 2017-10-02 NOTE — PROGRESS NOTES
Subjective:       Patient ID: Maikol Pacheco is a 37 y.o. female.    Chief Complaint: Dizziness; Blurred Vision; Hand Pain; and Numbness    37-year-old -American female patient with Patient Active Problem List:     Hyperlipidemia associated with type 2 diabetes mellitus     Hypertension associated with diabetes     Anemia     Uncontrolled type 2 diabetes mellitus with diabetic polyneuropathy, with long-term current use of insulin     Morbid obesity with BMI of 40.0-44.9, adult  Here with complaint of dizziness and blurry vision since Saturday, also complains of tingling and numbness sensation to both hands, denies of any neck pain.   Patient has been monitoring her sugars and has been running in the range of 200s, reports prior to that she was running in the range of 300s.  Patient has seen diabetes clinic recently and discontinued metformin, and was started on Actos, patient was also increased on Levemir  Has not been drinking enough fluids during the day.   Patient has been having headache, denies of any room spinning around her, decreased hearing or tinnitus.   Patient requesting to leave paperwork to be filled out, as she missed work from 9/30/17- 10/2/17.          Review of Systems   Constitutional: Negative for fatigue.   HENT: Negative for congestion.    Eyes: Negative for visual disturbance.   Respiratory: Negative for shortness of breath.    Cardiovascular: Negative for chest pain and leg swelling.   Gastrointestinal: Negative for abdominal pain, nausea and vomiting.   Endocrine: Negative for polydipsia, polyphagia and polyuria.   Musculoskeletal: Negative for myalgias.   Skin: Negative for rash.   Neurological: Positive for dizziness and numbness. Negative for syncope, weakness, light-headedness and headaches.   Psychiatric/Behavioral: Negative for sleep disturbance.         /82 (BP Location: Right arm, Patient Position: Sitting)   Pulse (!) 111   Temp 96 °F (35.6 °C) (Tympanic)    "Ht 5' 2" (1.575 m)   Wt 106.2 kg (234 lb 2.1 oz)   LMP  (LMP Unknown) Comment: patient is on depo shot, says periods are scattered  SpO2 99%   BMI 42.82 kg/m²   Objective:      Physical Exam   Constitutional: She is oriented to person, place, and time. She appears well-developed and well-nourished.   HENT:   Head: Normocephalic and atraumatic.   Right Ear: External ear normal.   Left Ear: External ear normal.   Mouth/Throat: Oropharynx is clear and moist.   Cardiovascular: Normal rate, regular rhythm and normal heart sounds.    No murmur heard.  Negative for carotid bruit bilaterally    Pulmonary/Chest: Effort normal and breath sounds normal. She has no wheezes.   Abdominal: Soft. Bowel sounds are normal. There is no tenderness.   Musculoskeletal: She exhibits no edema or tenderness.   Neurological: She is alert and oriented to person, place, and time.   Negative for Tinel's and Phalen's sign    Skin: Skin is warm and dry. No rash noted. No erythema.   Psychiatric: She has a normal mood and affect.         Assessment:       1. Dizziness    2. Hypertension associated with diabetes    3. Uncontrolled type 2 diabetes mellitus with diabetic polyneuropathy, with long-term current use of insulin    4. Morbid obesity with BMI of 40.0-44.9, adult    5. Hyperlipidemia associated with type 2 diabetes mellitus    6. Iron deficiency anemia, unspecified iron deficiency anemia type    7. Numbness and tingling in both hands        Plan:   Dizziness  -     CBC auto differential; Future; Expected date: 10/02/2017  -     EKG 12-lead; Future  -     US Carotid Bilateral; Future; Expected date: 10/02/2017    Will check further labs today.  Dizziness could be multifactorial, due to uncontrolled diabetes.   Will check further evaluation  Encouraged to drink adequate fluids    FMLA paperwork filled out for her work, to be excused from 9/30/17-10/2/17 secondary to dizziness.      Noted elevated pulse, but no shortness of breath " reported.  We'll continue to monitor  Will get further cardiac workup due to dizziness      Hypertension associated with diabetes  -     Basic metabolic panel; Future; Expected date: 10/02/2017  Blood pressure stable today currently taking ramipril 5 mg daily    Uncontrolled type 2 diabetes mellitus with diabetic polyneuropathy, with long-term current use of insulin  -     Basic metabolic panel; Future; Expected date: 10/02/2017  -     gabapentin (NEURONTIN) 100 MG capsule; Take 1 capsule (100 mg total) by mouth every evening.  Dispense: 30 capsule; Refill: 1  Secondary to neuropathy will start on gabapentin 100 mg daily at bedtime.  Advised to take it for 2 weeks  and if no response call us back  Likely secondary to uncontrolled diabetes.   Currently on Trulicity,  Amaryl 8 mg daily NovoLog 24 units 3 times daily, Levemir 15 units in the morning and 35 units in the evening and Actos 15 mg  Strict lifestyle changes recommended with 1800 ADA low-fat and low-cholesterol diet and exercise 30 minutes daily to lower blood glucose levels and to help her with neuropathy  Patient was encouraged to make an appointment with eye physician as well as patient is due for eye exam     Morbid obesity with BMI of 40.0-44.9, adult-lifestyle modifications recommended as noted above    Hyperlipidemia associated with type 2 diabetes mellitus-currently taking simvastatin 20 mg daily    Iron deficiency anemia, unspecified iron deficiency anemia type  -     CBC auto differential; Future; Expected date: 10/02/2017  -     Ferritin; Future; Expected date: 10/02/2017  -     Vitamin B12; Future; Expected date: 10/02/2017  Currently taking iron supplements once daily    Numbness and tingling in both hands  -     Vitamin B12; Future; Expected date: 10/02/2017  -     gabapentin (NEURONTIN) 100 MG capsule; Take 1 capsule (100 mg total) by mouth every evening.  Dispense: 30 capsule; Refill: 1    Other orders  -     Influenza - Quadrivalent (3 years  & older) (PF)  Flu shot given today

## 2017-10-04 ENCOUNTER — TELEPHONE (OUTPATIENT)
Dept: RADIOLOGY | Facility: HOSPITAL | Age: 37
End: 2017-10-04

## 2017-10-05 ENCOUNTER — PATIENT MESSAGE (OUTPATIENT)
Dept: OBSTETRICS AND GYNECOLOGY | Facility: CLINIC | Age: 37
End: 2017-10-05

## 2017-10-06 ENCOUNTER — HOSPITAL ENCOUNTER (OUTPATIENT)
Dept: RADIOLOGY | Facility: HOSPITAL | Age: 37
Discharge: HOME | End: 2017-10-06
Attending: FAMILY MEDICINE
Payer: COMMERCIAL

## 2017-10-06 ENCOUNTER — PATIENT MESSAGE (OUTPATIENT)
Dept: OBSTETRICS AND GYNECOLOGY | Facility: CLINIC | Age: 37
End: 2017-10-06

## 2017-10-06 DIAGNOSIS — R42 DIZZINESS: ICD-10-CM

## 2017-10-06 PROCEDURE — 93880 EXTRACRANIAL BILAT STUDY: CPT | Mod: TC,PO

## 2017-10-06 PROCEDURE — 93880 EXTRACRANIAL BILAT STUDY: CPT | Mod: 26,,, | Performed by: RADIOLOGY

## 2017-10-12 ENCOUNTER — PATIENT MESSAGE (OUTPATIENT)
Dept: OBSTETRICS AND GYNECOLOGY | Facility: CLINIC | Age: 37
End: 2017-10-12

## 2017-11-07 DIAGNOSIS — J04.0 LARYNGITIS ACUTE, SPASMODIC: ICD-10-CM

## 2017-11-07 RX ORDER — ALBUTEROL SULFATE 90 UG/1
2 AEROSOL, METERED RESPIRATORY (INHALATION) EVERY 4 HOURS PRN
Qty: 1 INHALER | Refills: 0 | Status: SHIPPED | OUTPATIENT
Start: 2017-11-07 | End: 2019-08-19

## 2018-01-13 ENCOUNTER — PATIENT MESSAGE (OUTPATIENT)
Dept: INTERNAL MEDICINE | Facility: CLINIC | Age: 38
End: 2018-01-13

## 2018-01-22 ENCOUNTER — PATIENT MESSAGE (OUTPATIENT)
Dept: INTERNAL MEDICINE | Facility: CLINIC | Age: 38
End: 2018-01-22

## 2018-01-22 ENCOUNTER — TELEPHONE (OUTPATIENT)
Dept: INTERNAL MEDICINE | Facility: CLINIC | Age: 38
End: 2018-01-22

## 2018-01-22 DIAGNOSIS — Z78.9 H/O FOREIGN TRAVEL: Primary | ICD-10-CM

## 2018-01-22 NOTE — TELEPHONE ENCOUNTER
We are out of hep A and dont carry typhoid, so pt will have to get it at the pharmacy. Please advise

## 2018-01-22 NOTE — TELEPHONE ENCOUNTER
Orders placed to get in [facility] in [facility]

## 2018-02-07 ENCOUNTER — PATIENT OUTREACH (OUTPATIENT)
Dept: ADMINISTRATIVE | Facility: HOSPITAL | Age: 38
End: 2018-02-07

## 2018-03-05 ENCOUNTER — PATIENT MESSAGE (OUTPATIENT)
Dept: INTERNAL MEDICINE | Facility: CLINIC | Age: 38
End: 2018-03-05

## 2018-03-05 ENCOUNTER — OFFICE VISIT (OUTPATIENT)
Dept: URGENT CARE | Facility: CLINIC | Age: 38
End: 2018-03-05
Payer: COMMERCIAL

## 2018-03-05 VITALS
WEIGHT: 247.81 LBS | BODY MASS INDEX: 45.6 KG/M2 | HEIGHT: 62 IN | TEMPERATURE: 98 F | HEART RATE: 95 BPM | OXYGEN SATURATION: 98 % | RESPIRATION RATE: 20 BRPM

## 2018-03-05 DIAGNOSIS — L73.9 FOLLICULITIS: Primary | ICD-10-CM

## 2018-03-05 PROCEDURE — 99999 PR PBB SHADOW E&M-EST. PATIENT-LVL IV: CPT | Mod: PBBFAC,,, | Performed by: PHYSICIAN ASSISTANT

## 2018-03-05 PROCEDURE — 99214 OFFICE O/P EST MOD 30 MIN: CPT | Mod: SA,S$GLB,, | Performed by: PHYSICIAN ASSISTANT

## 2018-03-05 PROCEDURE — 87070 CULTURE OTHR SPECIMN AEROBIC: CPT

## 2018-03-05 RX ORDER — HYDROXYZINE HYDROCHLORIDE 25 MG/1
25 TABLET, FILM COATED ORAL 3 TIMES DAILY PRN
Qty: 15 TABLET | Refills: 0 | Status: SHIPPED | OUTPATIENT
Start: 2018-03-05 | End: 2018-03-10

## 2018-03-05 RX ORDER — MUPIROCIN 20 MG/G
OINTMENT TOPICAL 3 TIMES DAILY
Qty: 30 G | Refills: 0 | Status: SHIPPED | OUTPATIENT
Start: 2018-03-05 | End: 2018-03-15

## 2018-03-05 RX ORDER — CLINDAMYCIN HYDROCHLORIDE 300 MG/1
300 CAPSULE ORAL 4 TIMES DAILY
Qty: 40 CAPSULE | Refills: 0 | Status: SHIPPED | OUTPATIENT
Start: 2018-03-05 | End: 2018-03-15

## 2018-03-05 NOTE — PATIENT INSTRUCTIONS
Folliculitis  Folliculitis is an inflammation of a hair follicle. A hair follicle is the little pocket where a hair grows out of the skin. Bacteria normally live on the skin. But sometimes bacteria can get trapped in a follicle and cause infection. This causes a bumpy rash. The area over the follicles is red and raised. It may itch or be painful. The bumps may have fluid (pus) inside. The pus may leak and then form crusts. Sores can spread to other areas of the body. Once it goes away, folliculitis can come back at any time. Severe cases may cause permanent hair loss and scarring.  Folliculitis can happen anywhere on the body where hair grows. It can be caused by rubbing from tight clothing. Ingrown hairs can cause it. Soaking in a hot tub or swimming pool that has bacteria in the water can cause it. It may also occur if a hair follicle is blocked by a bandage.  Sores often go away in a few days with no treatment. In some cases, medicine may be given. A small piece of skin or pus may be taken to find the type of bacteria causing the infection.  Home care  The healthcare provider may prescribe an antibiotic cream or ointment.  Oral antibiotics may also be prescribed. Or you may be told to use an over-the-counter antibiotic cream. Follow all instructions when using any of these medicines.  General care:  · Apply warm, moist compresses to the sores for 20 minutes up to 3 times a day. You can make a compress by soaking a cloth in warm water. Squeeze out excess water.  · Dont cut, poke, or squeeze the sores. This can be painful and spread infection.  · Dont scratch the affected area. Scratching can delay healing.  · Dont shave the areas affected by folliculitis.  · If the sores leak fluid, cover the area with a nonstick gauze bandage. Use as little tape as possible. Carefully discard all soiled bandages.  · Dress in loose cotton clothing.  · Change sheets and blankets if they are soiled by pus. Wash all clothes,  towels, sheets, and cloth diapers in soap and hot water. Do not share clothes, towels, or sheets with other family members.  · Do not soak the sores in bath water. This can spread infection. Instead, keep the area clean by gently washing sores with soap and warm water.  · Wash your hands or use antibacterial gels often to prevent spreading the bacteria.  Follow-up care  Follow up with your healthcare provider, or as advised.  When to seek medical advice  Call your healthcare provider right away if any of these occur:  · Fever of 100.4°F (38°C) or higher  · Spreading of the rash  · Rash does not get better with treatment  · Redness or swelling that gets worse  · Rash becomes more painful  · Foul-smelling fluid leaking from the skin  · Rash improves, but then comes back   Date Last Reviewed: 11/1/2016  © 3273-7769 [employer]. [address] [place] [address], [place] [address]. All rights reserved. This information is not intended as a substitute for professional medical care. Always follow your healthcare professional's instructions.

## 2018-03-06 ENCOUNTER — PATIENT MESSAGE (OUTPATIENT)
Dept: ADMINISTRATIVE | Facility: OTHER | Age: 38
End: 2018-03-06

## 2018-03-06 NOTE — PROGRESS NOTES
"Subjective:       Patient ID: Maikol Pacheco is a 37 y.o. female.    Chief Complaint: Allergic Reaction (scalp)    Allergic Reaction   This is a new problem. The current episode started more than 1 week ago (about 1 week ago she used a black color dye on her hair, has used this before but years ago, had itchy red scalp). The problem has been rapidly worsening (rapidly worsened today with the area swelling, becoming "hot" and oozing liquid from her scalp) since onset. The problem is severe. Associated with: hair dye. The time of exposure was just prior to onset. The exposure occurred at home. Associated symptoms include itching (the itching over her scalp is severe and now is burning as well), a rash and skin blistering. Pertinent negatives include no abdominal pain, chest pain, coughing, difficulty breathing, eye redness, vomiting or wheezing. Her rash is characterized by: blistering and localized.Past treatments include nothing. Swelling location: scalp.     Review of Systems   Constitutional: Negative for chills, fatigue and fever.   HENT: Negative for rhinorrhea and sore throat.    Eyes: Negative for pain and redness.   Respiratory: Negative for cough, shortness of breath and wheezing.    Cardiovascular: Negative for chest pain and leg swelling.   Gastrointestinal: Negative for abdominal pain, nausea and vomiting.   Musculoskeletal: Negative for myalgias.   Skin: Positive for itching (the itching over her scalp is severe and now is burning as well) and rash.   Neurological: Negative for headaches.       Objective:      Pulse 95   Temp 98 °F (36.7 °C) (Tympanic)   Resp 20   Ht 5' 2" (1.575 m)   Wt 112.4 kg (247 lb 12.8 oz)   LMP 03/05/2018 Comment: on depo  SpO2 98%   BMI 45.32 kg/m²   Physical Exam   Constitutional: She is oriented to person, place, and time. She appears well-developed and well-nourished. No distress.   HENT:   Head: Normocephalic.   Right Ear: External ear normal.   Left Ear: " External ear normal.   Nose: Nose normal.   Eyes: Conjunctivae and EOM are normal. Right eye exhibits no discharge. Left eye exhibits no discharge.   Neck: Normal range of motion. Neck supple.   Musculoskeletal:        Neurological: She is alert and oriented to person, place, and time. She has normal reflexes. She displays normal reflexes. Coordination normal.   Skin: Skin is warm and dry. Rash noted. She is not diaphoretic. There is erythema.        Vitals reviewed.      Assessment:       1. Folliculitis        Plan:       Folliculitis  -     clindamycin (CLEOCIN) 300 MG capsule; Take 1 capsule (300 mg total) by mouth 4 (four) times daily.  Dispense: 40 capsule; Refill: 0  -     hydrOXYzine HCl (ATARAX) 25 MG tablet; Take 1 tablet (25 mg total) by mouth 3 (three) times daily as needed for Itching.  Dispense: 15 tablet; Refill: 0  -     mupirocin (BACTROBAN) 2 % ointment; Apply topically 3 (three) times daily.  Dispense: 30 g; Refill: 0  -     Aerobic culture    Suspect contact dermatitis to hair dye now with superimposed infection likely bacterial given oozing drainage and pustules. Start clinda and bactroban. Instructed patient to avoid steroids until infection has cleared. Atarax prn for itching. She has already scheduled a follow up with her PCP later this week, unfortunately was unable to get in with Derm earlier.      Heather Trant PA-C [facility]

## 2018-03-08 LAB — BACTERIA SPEC AEROBE CULT: NORMAL

## 2018-03-14 ENCOUNTER — OFFICE VISIT (OUTPATIENT)
Dept: DIABETES | Facility: CLINIC | Age: 38
End: 2018-03-14
Payer: COMMERCIAL

## 2018-03-14 ENCOUNTER — OFFICE VISIT (OUTPATIENT)
Dept: INTERNAL MEDICINE | Facility: CLINIC | Age: 38
End: 2018-03-14
Payer: COMMERCIAL

## 2018-03-14 VITALS
DIASTOLIC BLOOD PRESSURE: 80 MMHG | BODY MASS INDEX: 45.19 KG/M2 | TEMPERATURE: 97 F | SYSTOLIC BLOOD PRESSURE: 122 MMHG | HEIGHT: 62 IN | WEIGHT: 245.56 LBS

## 2018-03-14 VITALS
DIASTOLIC BLOOD PRESSURE: 84 MMHG | BODY MASS INDEX: 46.09 KG/M2 | HEIGHT: 62 IN | WEIGHT: 250.44 LBS | SYSTOLIC BLOOD PRESSURE: 128 MMHG

## 2018-03-14 DIAGNOSIS — E11.69 HYPERLIPIDEMIA ASSOCIATED WITH TYPE 2 DIABETES MELLITUS: Chronic | ICD-10-CM

## 2018-03-14 DIAGNOSIS — E78.5 HYPERLIPIDEMIA ASSOCIATED WITH TYPE 2 DIABETES MELLITUS: Chronic | ICD-10-CM

## 2018-03-14 DIAGNOSIS — I10 HTN (HYPERTENSION): ICD-10-CM

## 2018-03-14 DIAGNOSIS — E78.5 HYPERLIPIDEMIA, UNSPECIFIED HYPERLIPIDEMIA TYPE: ICD-10-CM

## 2018-03-14 DIAGNOSIS — E11.59 HYPERTENSION ASSOCIATED WITH DIABETES: Primary | Chronic | ICD-10-CM

## 2018-03-14 DIAGNOSIS — I15.2 HYPERTENSION ASSOCIATED WITH DIABETES: Primary | Chronic | ICD-10-CM

## 2018-03-14 DIAGNOSIS — L73.9 FOLLICULITIS: ICD-10-CM

## 2018-03-14 DIAGNOSIS — E66.01 MORBID OBESITY WITH BMI OF 40.0-44.9, ADULT: ICD-10-CM

## 2018-03-14 LAB — GLUCOSE SERPL-MCNC: 256 MG/DL (ref 70–110)

## 2018-03-14 PROCEDURE — 82948 REAGENT STRIP/BLOOD GLUCOSE: CPT | Mod: S$GLB,,, | Performed by: PHYSICIAN ASSISTANT

## 2018-03-14 PROCEDURE — 99214 OFFICE O/P EST MOD 30 MIN: CPT | Mod: SA,S$GLB,, | Performed by: PHYSICIAN ASSISTANT

## 2018-03-14 PROCEDURE — 3074F SYST BP LT 130 MM HG: CPT | Mod: CPTII,S$GLB,, | Performed by: FAMILY MEDICINE

## 2018-03-14 PROCEDURE — 99999 PR PBB SHADOW E&M-EST. PATIENT-LVL III: CPT | Mod: PBBFAC,,, | Performed by: FAMILY MEDICINE

## 2018-03-14 PROCEDURE — 3046F HEMOGLOBIN A1C LEVEL >9.0%: CPT | Mod: CPTII,S$GLB,, | Performed by: PHYSICIAN ASSISTANT

## 2018-03-14 PROCEDURE — 3079F DIAST BP 80-89 MM HG: CPT | Mod: CPTII,S$GLB,, | Performed by: PHYSICIAN ASSISTANT

## 2018-03-14 PROCEDURE — 3079F DIAST BP 80-89 MM HG: CPT | Mod: CPTII,S$GLB,, | Performed by: FAMILY MEDICINE

## 2018-03-14 PROCEDURE — 99214 OFFICE O/P EST MOD 30 MIN: CPT | Mod: S$GLB,,, | Performed by: FAMILY MEDICINE

## 2018-03-14 PROCEDURE — 3074F SYST BP LT 130 MM HG: CPT | Mod: CPTII,S$GLB,, | Performed by: PHYSICIAN ASSISTANT

## 2018-03-14 PROCEDURE — 99999 PR PBB SHADOW E&M-EST. PATIENT-LVL IV: CPT | Mod: PBBFAC,,, | Performed by: PHYSICIAN ASSISTANT

## 2018-03-14 RX ORDER — GLIMEPIRIDE 4 MG/1
4 TABLET ORAL 2 TIMES DAILY
Qty: 180 TABLET | Refills: 0 | Status: SHIPPED | OUTPATIENT
Start: 2018-03-14 | End: 2019-08-19

## 2018-03-14 RX ORDER — RAMIPRIL 5 MG/1
5 CAPSULE ORAL DAILY
Qty: 90 CAPSULE | Refills: 1 | Status: SHIPPED | OUTPATIENT
Start: 2018-03-14 | End: 2019-09-17 | Stop reason: SDUPTHER

## 2018-03-14 RX ORDER — SIMVASTATIN 20 MG/1
20 TABLET, FILM COATED ORAL NIGHTLY
Qty: 30 TABLET | Refills: 0 | Status: SHIPPED | OUTPATIENT
Start: 2018-03-14 | End: 2018-03-22 | Stop reason: DRUGHIGH

## 2018-03-14 NOTE — PROGRESS NOTES
Subjective:      Patient ID: Maikol Pacheco is a 37 y.o. female.    PCP: Maisha Bartholomew MD      Maikol Pacheco is a pleasant 37 y.o. female presenting to follow up on diabetes mellitus. She has had diabetes for 6 or more years. Her last visit in Diabetes Management was 9/28/2017. Since that time she has had no improvement in her glycemia. Her glucometer down load reveals she has had 13 data points between 02/13/2018 - 03/14/2018 0 of which were after meals. Her average blood glucose is 304 mg/dl and his average reading per day is 0.4.  She is within the target range (ITR) 0%; above target range (ATR) 100%; and below target range (BTR) 0%; her standard deviation is 82. Her current concerns are glycemic control, but she continue to struggle with compliance. I am not sure if the underlying barriers are motivation, finances, or depression. Will consider outpatient case management or mental health evaluation at next visit.    She denies any hospital admissions, emergency room visits, hypoglycemia, syncope, diaphoresis, chest pain, or dyspnea.    She has gained 3 pounds since last visit. Her BMI is 45.81 kg/m².    Her blood sugar in the clinic today was:   Lab Results   Component Value Date    POCGLU 207 (A) 09/28/2017       We discussed the American diabetes Association recommendations:  hemoglobin A1c below 7.0%; all diabetics should be on statins unless contraindicated; one aspirin daily unless contraindicated; fasting blood sugar between 80 and 130 mg/dL; postprandial blood sugar below 180 mg/dl; prevention of hypoglycemia, may adjust goals to higher levels if persistent; ACE or ARB therapy if not contraindicated; and maintain in an ideal body weight with BMI below 25.    Maikol is noncompliant some of the time with DM medications. Misses more than 50% of her injections.    Maikol is noncompliant some of the time with lifestyle modifications to include activity and meal planning.       Current  "Outpatient Prescriptions:     albuterol 90 mcg/actuation inhaler, Inhale 2 puffs into the lungs every 4 (four) hours as needed for Wheezing or Shortness of Breath (Or Cough)., Disp: 1 Inhaler, Rfl: 0    BD INSULIN PEN NEEDLE UF MINI 31 gauge x 3/16" Ndle, AS DIRECTED, Disp: 100 each, Rfl: 11    blood sugar diagnostic Strp, 1 strip by Misc.(Non-Drug; Combo Route) route 2 (two) times daily. ONE TOUCH VERIO TEST STRIPS, Disp: 100 strip, Rfl: 11    clindamycin (CLEOCIN) 300 MG capsule, Take 1 capsule (300 mg total) by mouth 4 (four) times daily., Disp: 40 capsule, Rfl: 0    dulaglutide 1.5 mg/0.5 mL PnIj, Inject 1.5 mg into the skin every 7 days., Disp: 4 Syringe, Rfl: 11    ferrous sulfate 325 mg (65 mg iron) Tab tablet, Take 325 mg by mouth once daily. , Disp: , Rfl:     gabapentin (NEURONTIN) 100 MG capsule, Take 1 capsule (100 mg total) by mouth every evening., Disp: 30 capsule, Rfl: 1    glimepiride (AMARYL) 4 MG tablet, Take 2 tablets (8 mg total) by mouth once daily., Disp: 180 tablet, Rfl: 3    insulin aspart (NOVOLOG) 100 unit/mL InPn pen, Titrate up to 24 units subcutaneously three times a day 10-15 min before meals as directed in after visit summary., Disp: 21.6 mL, Rfl: 11    insulin detemir (LEVEMIR FLEXTOUCH) 100 unit/mL (3 mL) SubQ InPn pen, Inject 35 Units into the skin every evening. (Patient taking differently: Inject 15-35 Units into the skin 2 (two) times daily. 15 units in am, 35 units in pm), Disp: 10.5 mL, Rfl: 0    lancets 33 gauge Misc, 1 lancet by Misc.(Non-Drug; Combo Route) route 2 (two) times daily. ONE TOUCH VERIO, Disp: 100 each, Rfl: 11    mupirocin (BACTROBAN) 2 % ointment, Apply topically 3 (three) times daily., Disp: 30 g, Rfl: 0    pioglitazone (ACTOS) 15 MG tablet, Take 1 tablet (15 mg total) by mouth once daily., Disp: 30 tablet, Rfl: 11    ramipril (ALTACE) 5 MG capsule, Take 1 capsule (5 mg total) by mouth once daily., Disp: 30 capsule, Rfl: 6    simvastatin (ZOCOR) " 20 MG tablet, Take 1 tablet (20 mg total) by mouth every evening. Generic ok, Disp: 30 tablet, Rfl: 6    trazodone (DESYREL) 50 MG tablet, Take 1 tablet (50 mg total) by mouth every evening., Disp: 30 tablet, Rfl: 1  No current facility-administered medications for this visit.     STANDARDS OF CARE:   Eye exam: Dr. Hyatt in 1/2017.  Dental exam: Has regular exams; denies gums bleeding.  Podiatry exam: None.  SOCIAL: Single. Lives with mom. Has no children. 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     ACTIVITY LEVEL: Walks 3 times weekly for 30 minutes, goes to gym 4-5 times weekly.  MEAL PLANNING: Number of meals per day - two. Number of snacks per day - one. Per dietary recall, patient is not limiting carbohydrates, saturated fats and sodium. BLOOD GLUCOSE TESTING: Self-monitoring with One Touch   ACE/ARB: Yes  Statin: Yes      Health Maintenance   Topic Date Due    Eye Exam  01/19/2018    Hemoglobin A1c  02/24/2018    Lipid Panel  08/24/2018    Foot Exam  09/28/2018    Pap Smear with HPV Cotest  12/09/2019    TETANUS VACCINE  04/11/2024    Pneumococcal PPSV23 (Medium Risk) (2) 08/04/2044    Influenza Vaccine  Completed         Diabetes Management Status    Statin: Taking  ACE/ARB: Taking    Screening or Prevention Patient's value Goal Complete/Controlled?   HgA1C Testing and Control   Lab Results   Component Value Date    HGBA1C 10.3 (H) 08/24/2017      Annually/Less than 8% No   Lipid profile : 08/24/2017 Annually Yes   LDL control Lab Results   Component Value Date    LDLCALC 86.2 08/24/2017    Annually/Less than 100 mg/dl  Yes   Nephropathy screening Lab Results   Component Value Date    LABMICR 10.0 08/24/2017     Lab Results   Component Value Date    PROTEINUA Negative 10/28/2016    Annually Yes   Blood pressure BP Readings from Last 1 Encounters:   10/02/17 120/82    Less than 140/90 Yes   Dilated retinal exam : 01/19/2017 Annually No   Foot exam   : 09/28/2017 Annually Yes       The  following results were reviewed with patient.    Lab Results   Component Value Date    WBC 8.57 10/02/2017    HGB 12.0 10/02/2017    HCT 36.3 (L) 10/02/2017     (H) 10/02/2017    CHOL 143 08/24/2017    TRIG 69 08/24/2017    HDL 43 08/24/2017    ALT 9 (L) 08/24/2017    AST 13 08/24/2017     10/02/2017    K 3.7 10/02/2017     10/02/2017    CREATININE 0.8 10/02/2017    ESTGFRAFRICA >60.0 10/02/2017    EGFRNONAA >60.0 10/02/2017    BUN 11 10/02/2017    CO2 22 (L) 10/02/2017    TSH 0.526 08/24/2017    GLU 81 10/02/2017       Lab Results   Component Value Date    HGBA1C 10.3 (H) 08/24/2017    HGBA1C 10.7 (H) 06/15/2017    HGBA1C 11.0 (H) 06/09/2017       Lab Results   Component Value Date    CPEPTIDE 2.2 12/29/2016     Lab Results   Component Value Date    TSH 0.526 08/24/2017     Lab Results   Component Value Date    IRON 42 05/23/2013    TIBC 297.0 05/23/2013    FERRITIN 34 10/02/2017     Lab Results   Component Value Date    OSLAHIFT34 783 10/02/2017     Lab Results   Component Value Date    CALCIUM 9.3 10/02/2017    PHOS 3.0 12/29/2016       Review of patient's allergies indicates:   Allergen Reactions    No known drug allergies        Past Medical History:   Diagnosis Date    Anemia     Diabetes mellitus type II 2008    BS- 400's last week    Gastroparesis     Hyperlipidemia     Hypertension     Morbid obesity        Review of Systems   Constitutional: Negative.  Negative for activity change, appetite change, chills, diaphoresis, fatigue, fever and unexpected weight change.   HENT: Negative.  Negative for congestion, dental problem, drooling, ear discharge, ear pain, facial swelling, hearing loss, mouth sores, nosebleeds, postnasal drip, rhinorrhea, sinus pressure, sneezing, sore throat, tinnitus, trouble swallowing and voice change.    Eyes: Negative.  Negative for photophobia, pain, discharge, redness, itching and visual disturbance.   Respiratory: Negative.  Negative for apnea, cough,  "choking, chest tightness, shortness of breath, wheezing and stridor.    Cardiovascular: Negative.  Negative for chest pain, palpitations and leg swelling.   Gastrointestinal: Negative.  Negative for abdominal distention, abdominal pain, anal bleeding, blood in stool, constipation, diarrhea, nausea, rectal pain and vomiting.   Endocrine: Negative.  Negative for cold intolerance, heat intolerance, polydipsia, polyphagia and polyuria.   Genitourinary: Negative.  Negative for decreased urine volume, difficulty urinating, dyspareunia, dysuria, enuresis, flank pain, frequency, genital sores, hematuria, menstrual problem, pelvic pain, urgency, vaginal bleeding, vaginal discharge and vaginal pain.   Musculoskeletal: Negative.  Negative for arthralgias, back pain, gait problem, joint swelling, myalgias, neck pain and neck stiffness.   Skin: Negative.  Negative for color change, pallor, rash and wound.   Allergic/Immunologic: Negative.  Negative for environmental allergies, food allergies and immunocompromised state.   Neurological: Negative.  Negative for dizziness, tremors, seizures, syncope, facial asymmetry, speech difficulty, weakness, light-headedness, numbness and headaches.   Hematological: Negative.  Negative for adenopathy. Does not bruise/bleed easily.   Psychiatric/Behavioral: Negative.  Negative for agitation, behavioral problems, confusion, decreased concentration, dysphoric mood, hallucinations, self-injury, sleep disturbance and suicidal ideas. The patient is not nervous/anxious and is not hyperactive.       Objective:     Vitals - 1 value per visit 10/2/2017 3/5/2018 3/14/2018   SYSTOLIC 120 - 128   DIASTOLIC 82 - 84   PULSE 111 95 -   TEMPERATURE 96 98 -   RESPIRATIONS - 20 -   SPO2 99 98 -   Weight (lb) 234.13 247.8 250.44   Weight (kg) 106.2 112.4 113.6   HEIGHT 5' 2" 5' 2" 5' 2"   BODY MASS INDEX 42.82 45.32 45.81   VISIT REPORT - - -   Pain Score  0 2 0   Some recent data might be hidden     Physical " Exam   Constitutional: She is oriented to person, place, and time. She appears well-developed and well-nourished. She is cooperative.  Non-toxic appearance. She does not have a sickly appearance. She does not appear ill. No distress. She is not intubated.   HENT:   Head: Normocephalic and atraumatic. Not macrocephalic and not microcephalic. Head is without raccoon's eyes, without Bains's sign, without abrasion, without contusion, without laceration, without right periorbital erythema and without left periorbital erythema. Hair is normal.   Right Ear: Hearing, tympanic membrane, external ear and ear canal normal. No lacerations. No drainage, swelling or tenderness. No foreign bodies. No mastoid tenderness. Tympanic membrane is not injected, not scarred, not perforated, not erythematous, not retracted and not bulging. Tympanic membrane mobility is normal. No middle ear effusion. No hemotympanum. No decreased hearing is noted.   Left Ear: Hearing, tympanic membrane, external ear and ear canal normal. No lacerations. No drainage, swelling or tenderness. No foreign bodies. No mastoid tenderness. Tympanic membrane is not injected, not scarred, not perforated, not erythematous, not retracted and not bulging. Tympanic membrane mobility is normal.  No middle ear effusion. No hemotympanum. No decreased hearing is noted.   Nose: Nose normal. No mucosal edema, rhinorrhea, nose lacerations, sinus tenderness, nasal deformity, septal deviation or nasal septal hematoma. No epistaxis.  No foreign bodies. Right sinus exhibits no maxillary sinus tenderness and no frontal sinus tenderness. Left sinus exhibits no maxillary sinus tenderness and no frontal sinus tenderness.   Mouth/Throat: Oropharynx is clear and moist. No oropharyngeal exudate.   Eyes: Conjunctivae and EOM are normal. Pupils are equal, round, and reactive to light. Right eye exhibits no chemosis, no discharge and no exudate. No foreign body present in the right eye.  Left eye exhibits no chemosis, no discharge, no exudate and no hordeolum. No foreign body present in the left eye. Right conjunctiva is not injected. Right conjunctiva has no hemorrhage. Left conjunctiva is not injected. Left conjunctiva has no hemorrhage. No scleral icterus. Right eye exhibits normal extraocular motion and no nystagmus. Left eye exhibits normal extraocular motion and no nystagmus. Right pupil is round and reactive. Left pupil is round and reactive. Pupils are equal.   Neck: Trachea normal, normal range of motion and full passive range of motion without pain. Neck supple. Normal carotid pulses, no hepatojugular reflux and no JVD present. No tracheal tenderness, no spinous process tenderness and no muscular tenderness present. Carotid bruit is not present. No neck rigidity. No tracheal deviation, no edema, no erythema and normal range of motion present. No thyroid mass and no thyromegaly present.   Cardiovascular: Normal rate, regular rhythm, normal heart sounds and intact distal pulses.   No extrasystoles are present. PMI is not displaced.  Exam reveals no gallop, no friction rub and no decreased pulses.    No murmur heard.  Pulses:       Dorsalis pedis pulses are 2+ on the right side, and 2+ on the left side.        Posterior tibial pulses are 2+ on the right side, and 2+ on the left side.   Pulmonary/Chest: Effort normal and breath sounds normal. No accessory muscle usage or stridor. No apnea, no tachypnea and no bradypnea. She is not intubated. No respiratory distress. She has no decreased breath sounds. She has no wheezes. She has no rhonchi. She has no rales. Chest wall is not dull to percussion. She exhibits no mass, no tenderness, no bony tenderness, no laceration, no crepitus, no edema, no deformity, no swelling and no retraction.   Abdominal: Soft. Normal appearance and bowel sounds are normal. She exhibits no shifting dullness, no distension, no pulsatile liver, no fluid wave, no abdominal  bruit, no ascites, no pulsatile midline mass and no mass. There is no hepatosplenomegaly, splenomegaly or hepatomegaly. There is no tenderness. There is no rigidity, no rebound, no guarding, no CVA tenderness, no tenderness at McBurney's point and negative Doran's sign.   Musculoskeletal: Normal range of motion. She exhibits no edema or tenderness.        Right foot: There is normal range of motion and no deformity.        Left foot: There is normal range of motion and no deformity.   Feet:   Right Foot:   Protective Sensation: 5 sites tested. 5 sites sensed.   Skin Integrity: Negative for ulcer, blister, skin breakdown, erythema, warmth, callus or dry skin.   Left Foot:   Protective Sensation: 5 sites tested. 5 sites sensed.   Skin Integrity: Negative for ulcer, blister, skin breakdown, erythema, warmth, callus or dry skin.   Lymphadenopathy:        Head (right side): No submental, no submandibular, no tonsillar, no preauricular, no posterior auricular and no occipital adenopathy present.        Head (left side): No submental, no submandibular, no tonsillar, no preauricular, no posterior auricular and no occipital adenopathy present.     She has no cervical adenopathy.        Right cervical: No superficial cervical, no deep cervical and no posterior cervical adenopathy present.       Left cervical: No superficial cervical, no deep cervical and no posterior cervical adenopathy present.     She has no axillary adenopathy.   Neurological: She is alert and oriented to person, place, and time. She has normal reflexes. She is not disoriented. She displays no atrophy, no tremor and normal reflexes. No cranial nerve deficit or sensory deficit. She exhibits normal muscle tone. She displays no seizure activity. Coordination and gait normal.   Reflex Scores:       Bicep reflexes are 2+ on the right side and 2+ on the left side.       Brachioradialis reflexes are 2+ on the right side and 2+ on the left side.       Patellar  reflexes are 2+ on the right side and 2+ on the left side.  Skin: Skin is warm and dry. No abrasion, no bruising, no burn, no ecchymosis, no laceration, no lesion, no petechiae, no purpura and no rash noted. Rash is not macular, not papular, not maculopapular, not nodular, not pustular, not vesicular and not urticarial. She is not diaphoretic. No cyanosis or erythema. No pallor. Nails show no clubbing.   Psychiatric: She has a normal mood and affect. Her behavior is normal. Judgment and thought content normal. Her mood appears not anxious. Her affect is not angry, not blunt and not labile. Her speech is not rapid and/or pressured, not delayed, not tangential and not slurred. She is not agitated, not aggressive, not hyperactive, not slowed, not withdrawn, not actively hallucinating and not combative. Thought content is not paranoid and not delusional. Cognition and memory are not impaired. She does not express impulsivity or inappropriate judgment. She does not exhibit a depressed mood. She expresses no homicidal and no suicidal ideation. She expresses no suicidal plans and no homicidal plans. She is communicative. She exhibits normal recent memory and normal remote memory. She is attentive.   Nursing note and vitals reviewed.    Assessment:     1. Uncontrolled type 2 diabetes mellitus with diabetic polyneuropathy, with long-term current use of insulin       Plan:   Maikol Pacheco is seen today for   1. Uncontrolled type 2 diabetes mellitus with diabetic polyneuropathy, with long-term current use of insulin      We have discussed the etiology and treatment options associated with the diagnosis as well as alternatives. She has elected the following treatments.     Uncontrolled type 2 diabetes mellitus with diabetic polyneuropathy, with long-term current use of insulin  -     POCT glucose  -  Lack of compliance  - Will consider OPCM or Psyc in future  - Have intensified insulin therapy today.    1.) Patient was  "instructed to monitor blood glucose twice daily, fasting, and 2 hour post meal; if on Multiple Daily Injections (MDI) she will need to have pre-meal blood glucose as well. Reminded to bring BG meter or record to each visit for review.  2.) Reviewed pathophysiology of diabetes, complications related to the disease, importance of annual dilated eye exam and self daily foot examination.  3.) Continue medications as prescribed MDI with Levemir and Novolog; Trulicity; Pioglitazone. [facility] MyChart or Phone review in 1 week with BG records for adjustment of medication.  4.) Advised patient to continue to follow up with Dietician/CDE as directed.  5.) Discussed activity, benefits, methods, and precautions. Recommended patient start/continue some form of exercise and increase as tolerated to 60 minutes per day to facilitate weight loss and aid in control of BGs. Also reminded patient of WHO recommendation of 10,000 steps daily as a goal.   6.) A1C, TSH, Lipid Panel, CMP/renal panel with eGFR and Micro/Creatinine prior to next visit, if not already done.  7.) Return to clinic in 12 weeks for follow up. Advised patient to call clinic with any questions or concerns.     I have reviewed your results and they are still quite high. I would like you to start "Treating to Target". The treatment will be Insulin and your target will be the Fasting and 2 hour post meal blood sugar. It will work in this manner;    1. Goal for Fasting blood sugar is  mg/dl. I realize that you will need time to adjust to the new levels and presently you may feel too low if you are too aggressive now. So go slow and aim to lower your blood sugar to below 200 then 150 then 100 over several months.    2. Goal for 2 hour post meal blood sugar is below 180 mg/dl, here the same rules apply as in #1.    3. You will check your fasting blood sugar daily, if not where we would like it to be over a 3 day period then that evening we will increase the Levemir " "dose by 5 units. Then repeat the process over the next 3 days. Remember this is a slow process and take our time getting to goal. But, each week should be better than prior weeks. Blood sugars below 70 are unacceptable and should raise a "RED FLAG" where we may have to reduce our dose of insulin.    4. You will check your post meal glucose daily as well. However, each day you will check a different meal, (ie. Monday-breakfast; Tuesday- lunch; Wednesday- supper, then repeat). If your post meal glucose is not where we would like, increase pre-meal insulin by 2 units next time. A word of CAUTION: mealtime insulin is dependant on the size and concentration of your meal content. If not consuming a large meal do not take large dose of insulin. Use the reasonable person rule.     5. If you have any questions please do not hesitate to call.      Intensive insulin Therapy with correction factor:    You are on Intensive insulin therapy with Basal and Bolus insulin. Lantus, Levamir or NPH is your Basal insulin and will help maintain your fasting and between meal sugar. Your fast acting or rescue insulin is either Humalog, Novalog or Regular insulin and will control your post meal sugar.     You will Take 20 units of Levemir twice daily at least 12 hours apart. This will be adjusted up or down depending on your fasting blood sugar before breakfast.    Novolog will follow this pre meal schedule; Correction factor of "2 units per 50 mg/dl" and is based on 30-60 grams of carbohydrates per meal.    If blood sugar is below 70 eat first then check your blood sugar 2 hours later and make correction.  If blood pre-meal sugar is  70 -150 take 16 units of Novolog;  If blood pre-meal sugar is 151-200 take +2 units of Novolog;  If blood pre-meal sugar is 201-250 take +4 units of Novolog;  If blood pre-meal sugar is 251-300 take +6 units of Novolog;  If blood pre-meal sugar is 301-350 take +8 units of Novolog;  If blood pre-meal sugar is " 351-400+ take +10 units of Novolog;  Also increase water intake and call for appointment.    A total of 30 minutes was spent in face to face time, of which 50 % was spent in counseling patient on disease process, complications, treatment, and side effects of medications.    The patient was explained the above plan and given opportunity to ask questions.  She understands, chooses and consents to this plan and accepts all the risks, which include but are not limited to the risks mentioned above.   She understands the alternative of having no testing, interventions or treatments at this time. She left content and without further questions.

## 2018-03-14 NOTE — PROGRESS NOTES
Subjective:       Patient ID: Maikol Pacheco is a 37 y.o. female.    Chief Complaint: Follow-up    37-year-old -American female patient with Patient Active Problem List:     Hyperlipidemia associated with type 2 diabetes mellitus     Hypertension associated with diabetes     Anemia     Uncontrolled type 2 diabetes mellitus with diabetic polyneuropathy, with long-term current use of insulin     Morbid obesity with BMI of 40.0-44.9, adult  Here for follow-up on chronic medical conditions.  Patient came into urgent care secondary to reaction to the hair dye, and currently being treated with clindamycin, patient reports that her rash has been getting better, no discharge noted to the scalp, but continues to have mild itching and has appointment with dermatology.   Patient since placed on clindamycin, has not been taking her insulin, thinking there could be a reaction.   Blood glucose levels have been running in the range of 250s  Reports that she's been taking oral antidiabetic medications  Patient has appointment with diabetes clinic in 2 weeks and eye appointment  Mentions that she had last depot shot in September 2017, since been having breakthrough bleeding, has appointment with gynecology to discuss further contraceptive options.   Reports polyuria polydipsia, denies of any polyphagia.  Denies of any fever or chills, tingling or numbness sensation to extremities.              Review of Systems   Constitutional: Negative for fatigue and fever.   Eyes: Negative for visual disturbance.   Respiratory: Negative for shortness of breath.    Cardiovascular: Negative for chest pain and leg swelling.   Gastrointestinal: Negative for abdominal pain, nausea and vomiting.   Endocrine: Positive for polydipsia and polyuria. Negative for polyphagia.   Musculoskeletal: Negative for myalgias.   Skin: Positive for rash.   Neurological: Negative for weakness, light-headedness, numbness and headaches.  "  Psychiatric/Behavioral: Negative for sleep disturbance.         /80   Temp 96.5 °F (35.8 °C) (Tympanic)   Ht 5' 2" (1.575 m)   Wt 111.4 kg (245 lb 9.5 oz)   LMP 03/05/2018 Comment: on depo  BMI 44.92 kg/m²   Objective:      Physical Exam   Constitutional: She is oriented to person, place, and time. She appears well-developed and well-nourished.   HENT:   Head: Normocephalic and atraumatic.   Mouth/Throat: Oropharynx is clear and moist.   Cardiovascular: Normal rate, regular rhythm and normal heart sounds.    No murmur heard.  Pulmonary/Chest: Effort normal and breath sounds normal. She has no wheezes.   Abdominal: Soft. Bowel sounds are normal. There is no tenderness.   Musculoskeletal: She exhibits no edema or tenderness.   Neurological: She is alert and oriented to person, place, and time.   Skin: Skin is warm and dry. No rash noted.   Positive for maculopapular rash to the posterior scalp but no significant erythema or drainage noted   Psychiatric: She has a normal mood and affect.         Assessment:       1. Hypertension associated with diabetes    2. Hyperlipidemia associated with type 2 diabetes mellitus    3. Uncontrolled type 2 diabetes mellitus with diabetic polyneuropathy, with long-term current use of insulin    4. Morbid obesity with BMI of 40.0-44.9, adult    5. Folliculitis        Plan:   Hypertension associated with diabetes  -     Comprehensive metabolic panel; Future; Expected date: 03/14/2018  -     Lipid panel; Future; Expected date: 03/14/2018  Blood pressure stable today currently on ramipril 5 mg    Hyperlipidemia associated with type 2 diabetes mellitus  -     Lipid panel; Future; Expected date: 03/14/2018  Currently taking simvastatin 20 mg, refill has been sent today for 30 days supply, if labs look stable, will consider further refills    Uncontrolled type 2 diabetes mellitus with diabetic polyneuropathy, with long-term current use of insulin  -     Comprehensive metabolic " panel; Future; Expected date: 03/14/2018  -     Lipid panel; Future; Expected date: 03/14/2018  -     Hemoglobin A1c; Future; Expected date: 03/14/2018  -     Microalbumin/creatinine urine ratio; Future; Expected date: 03/14/2018  -     CBC auto differential; Future; Expected date: 03/14/2018  Severely  uncontrolled A1c.  Patient was advised to start taking insulin with antibiotics.   Patient has been taking Amaryl 4 mg 2 tablets daily, Actos 15 mg daily, Advised to start taking NovoLog 24 units 3 times a day and Levemir 15 units in the morning and 35 units in the evening and trulicity weekly    strict lifestyle changes recommended with 1800 ADA low-fat and low-cholesterol diet and exercise 30 minutes daily    up-to-date with diabetic foot exam and due for eye exam and appointment scheduled  Follow up with diabetes clinic closely     Morbid obesity with BMI of 40.0-44.9, adult-lifestyle modifications recommended as noted above     Folliculitis-finish clindamycin as prescribed and Bactroban cream, follow-up with dermatologist as scheduled  Patient was advised to take over-the-counter Benadryl as needed for rash at bedtime

## 2018-03-14 NOTE — PATIENT INSTRUCTIONS
" I have reviewed your results and they are still quite high. I would like you to start "Treating to Target". The treatment will be Insulin and your target will be the Fasting and 2 hour post meal blood sugar. It will work in this manner;    1. Goal for Fasting blood sugar is  mg/dl. I realize that you will need time to adjust to the new levels and presently you may feel too low if you are too aggressive now. So go slow and aim to lower your blood sugar to below 200 then 150 then 100 over several months.    2. Goal for 2 hour post meal blood sugar is below 180 mg/dl, here the same rules apply as in #1.    3. You will check your fasting blood sugar daily, if not where we would like it to be over a 3 day period then that evening we will increase the Levemir dose by 5 units. Then repeat the process over the next 3 days. Remember this is a slow process and take our time getting to goal. But, each week should be better than prior weeks. Blood sugars below 70 are unacceptable and should raise a "RED FLAG" where we may have to reduce our dose of insulin.    4. You will check your post meal glucose daily as well. However, each day you will check a different meal, (ie. Monday-breakfast; Tuesday- lunch; Wednesday- supper, then repeat). If your post meal glucose is not where we would like, increase pre-meal insulin by 2 units next time. A word of CAUTION: mealtime insulin is dependant on the size and concentration of your meal content. If not consuming a large meal do not take large dose of insulin. Use the reasonable person rule.     5. If you have any questions please do not hesitate to call.      Intensive insulin Therapy with correction factor:    You are on Intensive insulin therapy with Basal and Bolus insulin. Lantus, Levamir or NPH is your Basal insulin and will help maintain your fasting and between meal sugar. Your fast acting or rescue insulin is either Humalog, Novalog or Regular insulin and will control your " "post meal sugar.     You will Take 20 units of Levemir twice daily at least 12 hours apart. This will be adjusted up or down depending on your fasting blood sugar before breakfast.    Novolog will follow this pre meal schedule; Correction factor of "2 units per 50 mg/dl" and is based on 30-50 grams of carbohydrates per meal.    If blood sugar is below 70 eat first then check your blood sugar 2 hours later and make correction.  If blood pre-meal sugar is  70 -150 take 16 units of Novolog;  If blood pre-meal sugar is 151-200 take +2 units of Novolog;  If blood pre-meal sugar is 201-250 take +4 units of Novolog;  If blood pre-meal sugar is 251-300 take +6 units of Novolog;  If blood pre-meal sugar is 301-350 take +8 units of Novolog;  If blood pre-meal sugar is 351-400+ take +10 units of Novolog;  Also increase water intake and call for appointment.    "

## 2018-03-20 ENCOUNTER — TELEPHONE (OUTPATIENT)
Dept: DIABETES | Facility: CLINIC | Age: 38
End: 2018-03-20

## 2018-03-20 NOTE — TELEPHONE ENCOUNTER
Called pt to inquire if she'd be able to move appt time on 3/22 from 10am to 12:30pm. Left msg at both home and mobile number. If she calls back, please add note to her appt if she is able to accommodate time change.   Aurelia Canchola

## 2018-03-21 ENCOUNTER — LAB VISIT (OUTPATIENT)
Dept: LAB | Facility: HOSPITAL | Age: 38
End: 2018-03-21
Attending: FAMILY MEDICINE
Payer: COMMERCIAL

## 2018-03-21 LAB
CREAT UR-MCNC: 127 MG/DL
MICROALBUMIN UR DL<=1MG/L-MCNC: 4 UG/ML
MICROALBUMIN/CREATININE RATIO: 3.1 UG/MG

## 2018-03-21 PROCEDURE — 82043 UR ALBUMIN QUANTITATIVE: CPT

## 2018-03-22 DIAGNOSIS — E78.2 MIXED HYPERLIPIDEMIA: Primary | ICD-10-CM

## 2018-03-22 RX ORDER — SIMVASTATIN 40 MG/1
40 TABLET, FILM COATED ORAL NIGHTLY
Qty: 90 TABLET | Refills: 3 | Status: SHIPPED | OUTPATIENT
Start: 2018-03-22 | End: 2019-05-29 | Stop reason: SDUPTHER

## 2018-03-26 ENCOUNTER — OFFICE VISIT (OUTPATIENT)
Dept: INTERNAL MEDICINE | Facility: CLINIC | Age: 38
End: 2018-03-26
Payer: COMMERCIAL

## 2018-03-26 VITALS
DIASTOLIC BLOOD PRESSURE: 82 MMHG | HEIGHT: 62 IN | OXYGEN SATURATION: 99 % | WEIGHT: 253.5 LBS | HEART RATE: 100 BPM | BODY MASS INDEX: 46.65 KG/M2 | SYSTOLIC BLOOD PRESSURE: 124 MMHG | TEMPERATURE: 97 F

## 2018-03-26 DIAGNOSIS — R20.0 BILATERAL HAND NUMBNESS: Primary | ICD-10-CM

## 2018-03-26 DIAGNOSIS — E11.59 HYPERTENSION ASSOCIATED WITH DIABETES: Chronic | ICD-10-CM

## 2018-03-26 DIAGNOSIS — I15.2 HYPERTENSION ASSOCIATED WITH DIABETES: Chronic | ICD-10-CM

## 2018-03-26 DIAGNOSIS — G56.03 CARPAL TUNNEL SYNDROME ON BOTH SIDES: ICD-10-CM

## 2018-03-26 PROCEDURE — 99214 OFFICE O/P EST MOD 30 MIN: CPT | Mod: S$GLB,,, | Performed by: FAMILY MEDICINE

## 2018-03-26 PROCEDURE — 3079F DIAST BP 80-89 MM HG: CPT | Mod: CPTII,S$GLB,, | Performed by: FAMILY MEDICINE

## 2018-03-26 PROCEDURE — 3074F SYST BP LT 130 MM HG: CPT | Mod: CPTII,S$GLB,, | Performed by: FAMILY MEDICINE

## 2018-03-26 PROCEDURE — 99999 PR PBB SHADOW E&M-EST. PATIENT-LVL III: CPT | Mod: PBBFAC,,, | Performed by: FAMILY MEDICINE

## 2018-03-26 PROCEDURE — 3046F HEMOGLOBIN A1C LEVEL >9.0%: CPT | Mod: CPTII,S$GLB,, | Performed by: FAMILY MEDICINE

## 2018-03-26 RX ORDER — GABAPENTIN 300 MG/1
300 CAPSULE ORAL NIGHTLY
Qty: 30 CAPSULE | Refills: 3 | Status: SHIPPED | OUTPATIENT
Start: 2018-03-26 | End: 2019-08-19

## 2018-03-26 RX ORDER — MUPIROCIN 20 MG/G
OINTMENT TOPICAL
COMMUNITY
Start: 2018-03-05 | End: 2019-05-29

## 2018-03-26 NOTE — PROGRESS NOTES
"Subjective:       Patient ID: Maikol Pacheco is a 37 y.o. female.    Chief Complaint: Hand Pain (bilateral; cold and numbness )    37-year-old Afro-American female patient with Patient Active Problem List:     Hyperlipidemia associated with type 2 diabetes mellitus     Hypertension associated with diabetes     Anemia     Uncontrolled type 2 diabetes mellitus with diabetic polyneuropathy, with long-term current use of insulin     Morbid obesity with BMI of 40.0-44.9, adult  Here with complaint of bilateral hand pain as well as numbness worse at nighttime, patient works on computer all day.  Reports hand pain as 5/10.   Patient reports that her sugars are being gradually getting better running in the range of 80s to 100s, since increasing insulin  Denies of any chest pain or shortness of breath.   Patient currently not taking gabapentin 100 mg as prescribed in the past  Denies of any neck pain      Review of Systems   Constitutional: Negative for fatigue.   Eyes: Negative for visual disturbance.   Respiratory: Negative for shortness of breath.    Cardiovascular: Negative for chest pain and leg swelling.   Gastrointestinal: Negative for abdominal pain, nausea and vomiting.   Endocrine: Negative for polydipsia, polyphagia and polyuria.   Musculoskeletal: Positive for arthralgias and myalgias. Negative for neck pain.   Skin: Negative for rash.   Neurological: Positive for numbness. Negative for light-headedness and headaches.   Psychiatric/Behavioral: Negative for sleep disturbance.         /82 (BP Location: Left arm, Patient Position: Sitting)   Pulse 100   Temp 96.6 °F (35.9 °C) (Tympanic)   Ht 5' 2" (1.575 m)   Wt 115 kg (253 lb 8.5 oz)   LMP 03/05/2018 Comment: on depo  SpO2 99%   BMI 46.37 kg/m²   Objective:      Physical Exam   Constitutional: She is oriented to person, place, and time. She appears well-developed and well-nourished.   HENT:   Head: Normocephalic and atraumatic. "   Mouth/Throat: Oropharynx is clear and moist.   Cardiovascular: Normal rate, regular rhythm and normal heart sounds.    No murmur heard.  Pulmonary/Chest: Effort normal and breath sounds normal. She has no wheezes.   Abdominal: Soft. Bowel sounds are normal. There is no tenderness.   Musculoskeletal: She exhibits tenderness. She exhibits no edema.   Positive for bilateral Tinel's sign, in  bilateral wrists  No significant paraspinal cervical muscle tenderness noted   Neurological: She is alert and oriented to person, place, and time.   Skin: Skin is warm and dry. No rash noted. No erythema.   Psychiatric: She has a normal mood and affect.         Assessment:       1. Bilateral hand numbness    2. Carpal tunnel syndrome on both sides    3. Uncontrolled type 2 diabetes mellitus with diabetic polyneuropathy, with long-term current use of insulin    4. Hypertension associated with diabetes        Plan:   Bilateral hand numbness  -     Nerve conduction;sensory; Future  -     arm brace Misc; To apply to bilateral wrists  Dispense: 2 each; Refill: 0  Carpal tunnel syndrome on both sides  -     Nerve conduction;sensory; Future  -     arm brace Misc; To apply to bilateral wrists  Dispense: 2 each; Refill: 0  Likely carpal tunnel syndrome bilaterally, will get nerve conduction studies to rule out.   Carpal tunnel sleeve prescribed today  Diabetic neuropathy cannot be ruled out.     Uncontrolled type 2 diabetes mellitus with diabetic polyneuropathy, with long-term current use of insulin  -     gabapentin (NEURONTIN) 300 MG capsule; Take 1 capsule (300 mg total) by mouth every evening.  Dispense: 30 capsule; Refill: 3  Severely uncontrolled A1c, secondary to neuropathy will start on gabapentin 300 mg at bedtime.   Patient was encouraged to start taking insulin as per the regimen, and lower blood glucose levels  Follow up closely with diabetes clinic    Hypertension associated with diabetes-blood pressure stable today

## 2018-03-29 ENCOUNTER — NUTRITION (OUTPATIENT)
Dept: DIABETES | Facility: CLINIC | Age: 38
End: 2018-03-29
Payer: COMMERCIAL

## 2018-03-29 VITALS — WEIGHT: 263.25 LBS | BODY MASS INDEX: 48.44 KG/M2 | HEIGHT: 62 IN

## 2018-03-29 PROCEDURE — 99999 PR PBB SHADOW E&M-EST. PATIENT-LVL III: CPT | Mod: PBBFAC,,, | Performed by: DIETITIAN, REGISTERED

## 2018-03-29 PROCEDURE — G0108 DIAB MANAGE TRN  PER INDIV: HCPCS | Mod: S$GLB,,, | Performed by: DIETITIAN, REGISTERED

## 2018-03-29 NOTE — LETTER
March 29, 2018        Brennan Granados Jr., PA-C  [address]  [place] [address]             [facility] - Diabetes Management  [address] [facility] [address]  [place] [address]  Phone: [phone]  Fax: [phone]   Patient: Maikol Pacheco   MR Number: [id number]   YOB: 1980   Date of Visit: 3/29/2018       Dear Dr. Granados:    Thank you for referring Maikol Pacheco to me for evaluation. Below are the relevant portions of my assessment and plan of care.     Pt interested and would benefit in dima personal cgms. Please consider if you agree. If you have questions, please do not hesitate to call me. I look forward to following Maikol along with you.    Sincerely,      Jesika Weber RD, CDE           CC  Maisha Bartholomew MD

## 2018-03-29 NOTE — PROGRESS NOTES
Diabetes Education  Author: Jesika Weber RD, CDE  Date: 3/29/2018    Diabetes Education Visit  Diabetes Education Record Assessment/Progress: Comprehensive/Group    Diabetes Type  Diabetes Type : Type II     Nutrition  Meal Planning:  (~2600 cals/d. Pt is working to reduce carb portions, fast foods (times of social stresss). Pt requests meal prep support strategies for when she works and attends school. )  Meal Plan 24 Hour Recall - Breakfast: slim fast, cutie  Meal Plan 24 Hour Recall - Lunch: burger OR salad, sprouts, cheese, turkey/ham, dressing OR sandwich (wheat)  Meal Plan 24 Hour Recall - Dinner: grilled pork chop with leftover salad  Meal Plan 24 Hour Recall - Snack: popcorn    Monitoring   Self Monitoring : Per recall, fst bg 88, 150-180; acl 140-150; acd cannot recall. Pt plans to upload bg records to [employer].   Blood Glucose Logs: No  In the last month, how often have you had a low blood sugar reaction?: never    Exercise   Exercise Type: walking  Intensity: Moderate  Frequency:  (3d/wk)  Duration: 30 min    Current Diabetes Treatment   Current Treatment:  (trulicity 1.5 mg weekly; amaryl 4 mg 1 tab twice daily; levemir 20units am, 35pm; novolog 20 units ac per s/s; actos 15 mg daily)    Social History  Preferred Learning Method: Face to Face  Primary Support: Self    Barriers to Change  Barriers to Change: None  Learning Challenges : None    Readiness to Learn   Readiness to Learn : Eager    Cultural Influences  Cultural Influences: No    Diabetes Education Assessment/Progress  Diabetes Disease Process (diabetes disease process and treatment options): Discussion, Individual Session, Demonstrates Understanding/Competency(verbalizes/demonstrates)  Nutrition (Incorporating nutritional management into one's lifestyle): Discussion, Individual Session, Demonstrates Understanding/Competency (verbalizes/demonstrates), Written Materials Provided  Physical Activity (incorporating physical activity into  one's lifestyle): Discussion, Individual Session, Demonstrates Understanding/Competency (verbalizes/demonstrates)  Medications (states correct name, dose, onset, peak, duration, side effects & timing of meds): Discussion, Individual Session, Demonstrates Understanding/Competency(verbalizes/demonstrates), Written Materials Provided  Monitoring (monitoring blood glucose/other parameters & using results): Discussion, Individual Session, Demonstrates Understanding/Competency (verbalizes/demonstrates)  Acute Complications (preventing, detecting, and treating acute complications): Discussion, Individual Session, Demonstrates Understanding/Competency (verbalizes/demonstrates)  Chronic Complications (preventing, detecting, and treating chronic complications): Not Covered/Deferred  Clinical (diabetes, other pertinent medical history, and relevant comorbidities reviewed during visit): Discussion, Individual Session, Demonstrates Understanding/Competency (verbalizes/demonstrates)  Cognitive (knowledge of self-management skills, functional health literacy): Discussion, Individual Session, Demonstrates Understanding/Competency (verbalizes/demonstrates)  Psychosocial (emotional response to diabetes): Discussion, Individual Session, Demonstrates Understanding/Competency (verbalizes/demonstrates)  Diabetes Distress and Support Systems: Discussion, Individual Session, Demonstrates Understanding/Competency (verbalizes/demonstrates)  Behavioral (readiness for change, lifestyle practices, self-care behaviors): Discussion, Individual Session, Demonstrates Understanding/Competency (verbalizes/demonstrates)    Goals  Patient has selected/evaluated goals during today's session: Yes, evaluated  Healthy Eating: Set (use meal plan - custom menus (mexican style), avoid fast foods, use MR entrees/shakes)  Met Percentage : 25%  Start Date: 03/29/18 (use meal plan - food prep 1-2x/wk)  Target Date: 04/25/18  Monitoring: Set (test bg fasting, acl,  acd daily; bring meter, records to clinic)  Met Percentage : 50%  Start Date: 03/29/18  Target Date: 04/25/18    Diabetes Self-Management Support Plan  Exercise/Nutrition: websites  Stress Management: family  Review Status: Patient has selected and agrees to support plan.    Diabetes Care Plan/Intervention  Education Plan/Intervention: Individual Follow-Up DSMT, Endocrine Provider Visit Set Up (Pt interested and would benefit in dima personal cgms; will forward to Priscila for consideration.)    Diabetes Meal Plan  Restrictions: Low Fat, Low Sodium  Calories: 1200, 1400  Carbohydrate Per Meal: 20-30g, 30-45g  Carbohydrate Per Snack : 15-20g, 7-15g    Education Units of Time   Time Spent: 30 min    Health Maintenance was reviewed today with patient. Discussed with patient importance of routine eye exams, foot exams/foot care, blood work (i.e.: A1c, microalbumin, and lipid), dental visits, yearly flu vaccine, and pneumonia vaccine as indicated by PCP. Patient verbalized understanding.     Health Maintenance Topics with due status: Not Due       Topic Last Completion Date    Pneumococcal PPSV23 (Medium Risk) 01/14/2014    TETANUS VACCINE 04/11/2014    Pap Smear with HPV Cotest 12/09/2016    Foot Exam 03/14/2018    Lipid Panel 03/21/2018    Hemoglobin A1c 03/21/2018     Health Maintenance Due   Topic Date Due    Eye Exam  01/19/2018

## 2018-04-05 ENCOUNTER — TELEPHONE (OUTPATIENT)
Dept: INTERNAL MEDICINE | Facility: CLINIC | Age: 38
End: 2018-04-05

## 2018-04-05 NOTE — TELEPHONE ENCOUNTER
I have ordered nerve conduction studies in the last visit . Patient should get that testing scheduled to rule out carpal tunnel

## 2018-04-05 NOTE — TELEPHONE ENCOUNTER
I returned a call back to the pt regarding a referral for her to have her hands tested for carpal tunnel syndrome please advise of your recommendations or what you would like done.

## 2018-04-06 ENCOUNTER — TELEPHONE (OUTPATIENT)
Dept: INTERNAL MEDICINE | Facility: CLINIC | Age: 38
End: 2018-04-06

## 2018-04-06 DIAGNOSIS — R20.2 NUMBNESS AND TINGLING IN BOTH HANDS: Primary | ICD-10-CM

## 2018-04-06 DIAGNOSIS — R20.0 NUMBNESS AND TINGLING IN BOTH HANDS: Primary | ICD-10-CM

## 2018-04-06 NOTE — TELEPHONE ENCOUNTER
----- Message from Mariangel Laguerre MA sent at 4/6/2018  9:03 AM CDT -----  Hi, was the order supposed to be for neurology dept or audiology ? Because the lady that does the scheduling Clare Mendes . She stated it's neurology not audiology. Please resubmit the order, so she may schedule. Thanks

## 2018-04-06 NOTE — TELEPHONE ENCOUNTER
I left the pt a message stating that someone from Physiatry will call to schedule her . Also feel free to call back if any further concerns or questions.

## 2018-04-09 ENCOUNTER — TELEPHONE (OUTPATIENT)
Dept: PHYSICAL MEDICINE AND REHAB | Facility: CLINIC | Age: 38
End: 2018-04-09

## 2018-04-11 ENCOUNTER — TELEPHONE (OUTPATIENT)
Dept: PHYSICAL MEDICINE AND REHAB | Facility: CLINIC | Age: 38
End: 2018-04-11

## 2018-04-11 NOTE — TELEPHONE ENCOUNTER
----- Message from Dalila Guerra sent at 4/11/2018  9:50 AM CDT -----  Pt at [phone]//states she is needing to reschedule her EMG appt scheduled on 4-17-18//please call//nida/diane

## 2018-04-19 ENCOUNTER — OFFICE VISIT (OUTPATIENT)
Dept: PHYSICAL MEDICINE AND REHAB | Facility: CLINIC | Age: 38
End: 2018-04-19
Payer: COMMERCIAL

## 2018-04-19 VITALS
HEART RATE: 91 BPM | BODY MASS INDEX: 48.4 KG/M2 | HEIGHT: 62 IN | WEIGHT: 263 LBS | RESPIRATION RATE: 14 BRPM | DIASTOLIC BLOOD PRESSURE: 76 MMHG | SYSTOLIC BLOOD PRESSURE: 121 MMHG

## 2018-04-19 DIAGNOSIS — G56.03 BILATERAL CARPAL TUNNEL SYNDROME: Primary | ICD-10-CM

## 2018-04-19 PROCEDURE — 3078F DIAST BP <80 MM HG: CPT | Mod: CPTII,S$GLB,, | Performed by: PHYSICAL MEDICINE & REHABILITATION

## 2018-04-19 PROCEDURE — 99999 PR PBB SHADOW E&M-EST. PATIENT-LVL III: CPT | Mod: PBBFAC,,, | Performed by: PHYSICAL MEDICINE & REHABILITATION

## 2018-04-19 PROCEDURE — 99204 OFFICE O/P NEW MOD 45 MIN: CPT | Mod: 25,S$GLB,, | Performed by: PHYSICAL MEDICINE & REHABILITATION

## 2018-04-19 PROCEDURE — 3074F SYST BP LT 130 MM HG: CPT | Mod: CPTII,S$GLB,, | Performed by: PHYSICAL MEDICINE & REHABILITATION

## 2018-04-19 PROCEDURE — 95911 NRV CNDJ TEST 9-10 STUDIES: CPT | Mod: S$GLB,,, | Performed by: PHYSICAL MEDICINE & REHABILITATION

## 2018-04-19 NOTE — LETTER
April 19, 2018      Maisha Bartholomew MD  [address] [facility] [address] [place] [address]           [facility] - Physiatry  [address] [place] [address]  Phone: [phone]  Fax: [phone]          Patient: Maikol Pacheco   MR Number: [id number]   YOB: 1980   Date of Visit: 4/19/2018       Dear Dr. Maisha Bartholomew:    Thank you for referring Maikol Pacheco to me for evaluation. Attached you will find relevant portions of my assessment and plan of care.    If you have questions, please do not hesitate to call me. I look forward to following Maikol Pacheco along with you.    Sincerely,    Mali Dueñas MD    Enclosure  CC:  No Recipients    If you would like to receive this communication electronically, please contact [email or web address] or [phone] to request more information on [employer] access.    For providers and/or their staff who would like to refer a patient to [facility], please contact us through our one-stop-shop provider referral line, [facility], at [phone].    If you feel you have received this communication in error or would no longer like to receive these types of communications, please e-mail [email or web address]

## 2018-04-19 NOTE — PATIENT INSTRUCTIONS
Carpal Tunnel Syndrome    Carpal tunnel syndrome is a painful condition of the wrist and arm. It is caused by pressure on the median nerve.  The median nerve is one of the nerves that give feeling and movement to the hand. It passes through a tunnel in the wrist called the carpal tunnel. This tunnel is made up of bones and ligaments. Narrowing of this tunnel or swelling of the tissues inside the tunnel puts pressure on the median nerve. This causes numbness, pins and needles, or electric shooting pains in your hand and forearm. Often the pain is worse at night and may wake you when you are asleep.  Carpal tunnel syndrome may occur during pregnancy and with use of birth control pills. It is more common in workers who must often bend their wrists. It is also common in people who work with power tools that cause strong vibrations.  Home care  · Rest the painful wrist. Avoid repeated bending of the wrist back and forth. This puts pressure on the median nerve. Avoid using power tools with strong vibrations.  · If you were given a splint, wear it at night while you sleep. You may also wear it during the day for comfort.  · Move your fingers and wrists often to avoid stiffness.  · Elevate your arms on pillows when you lie down.  · Try using the unaffected hand more.  · Try not to hold your wrists in a bent, downward position.  · Sometimes changes in the work place may ease symptoms. If you type most of the day, it may help to change the position of your keyboard or add a wrist support. Your wrist should be in a neutral position and not bent back when typing.  · You may use over-the-counter pain medicine to treat pain and inflammation, unless another medicine was prescribed. Anti-inflammatory pain medicines, such as ibuprofen or naproxen may be more effective than acetaminophen, which treats pain, but not inflammation. If you have chronic liver or kidney disease or ever had a stomach ulcer or GI bleeding, talk with your  doctor before using these medicines.  · Opioid pain medicine will only give temporary relief and does not treat the problem. If pain continues, you may need a shot of a steroid drug into your wrist.  · If the above methods fail, you may need surgery. This will open the carpal tunnel and release the pressure on the trapped nerve.  Follow-up care  Follow up with your healthcare provider, or as advised, if the pain doesnt begin to improve within the next week.  If X-rays were taken, you will be notified of any new findings that may affect your care.  When to seek medical advice  Call your healthcare provider right away if any of these occur:  · Pain not improving with the above treatment  · Fingers or hand become cold, blue, numb, or tingly  · Your whole arm becomes swollen or weak  Date Last Reviewed: 11/23/2015  © 1544-8822 [employer]. [address] [place] [address]. All rights reserved. This information is not intended as a substitute for professional medical care. Always follow your healthcare professional's instructions.        Carpal Tunnel Syndrome Prevention Tips  Some repetitive hand activities put you at higher risk for carpal tunnel syndrome (CTS). But you can reduce your risk. Learn how to change the way you use your hands. Below are tips for at home and on the job. Be sure to also follow the hand and wrist safety policies at your workplace.      Keep your wrist in a neutral (straight) position when exercising.      Keep your wrist in neutral  Keep a neutral (straight) wrist position as often as you can. Dont use your wrist in a bent (flexed) position for long periods of time. This includes extended or twisted positions.  Watch your   Dont just use your thumb and index finger to grasp or lift. This can put stress on your wrist. When you can, use your whole hand and all its fingers to grasp an object.  Minimize repetition  Dont move your arms or hands or hold an object in  the same way for long periods of time. Even simple, light tasks can cause injury this way. Instead, alternate tasks or switch hands.  Rest your hands  Give your hands a break from time to time with a rest. Even a few minutes once an hour can help.  Reduce speed and force  Slow down the speed in which you do a forceful, repetitive motion. This gives your wrist time to recover from the effort. Use power tools to help reduce the force.  Strengthen the muscles  Weak muscles may lead to a poor wrist or arm position. Exercises will make your hand and arm muscles stronger. This can help you keep a better position.  Date Last Reviewed: 9/11/2015 © 2000-2017 [employer]. [address] [place] [address], [place] [address]. All rights reserved. This information is not intended as a substitute for professional medical care. Always follow your healthcare professional's instructions.        Understanding Carpal Tunnel Syndrome    The carpal tunnel is a narrow space inside the wrist. It is ringed by bone and a band of tough tissue called the transverse carpal ligament. A major nerve called the median nerve runs from the forearm into the hand through the carpal tunnel. Tendons also run through the carpal tunnel.  With carpal tunnel syndrome, the tendons or nearby tissues within the carpal tunnel may swell or thicken. Or the transverse carpal ligament may harden and shorten. This narrows the space in the carpal tunnel and puts pressure on the median nerve. This pressure leads to tingling and numbness of the hand and wrist. In time, the condition can make even simple tasks hard to do.  What causes carpal tunnel syndrome?  Doctors arent entirely clear why the condition occurs. Certain things may make a person more likely to have it. These include:  · Being female  · Being pregnant  · Being overweight  · Having diabetes or rheumatoid arthritis  Symptoms of carpal tunnel syndrome  Symptoms often come and go. At first, symptoms  may occur mainly at night. Later, they may be noticed during the day as well. They may get worse with activities such as driving, reading, typing, or holding a phone. Symptoms can include:  · Tingling and numbness in the hand or wrist  · Sharp pain that shoots up the arm or down to the fingers  · Hand stiffness or cramping, especially in the morning  · Trouble making a fist  · Hand weakness and clumsiness  Treatment for carpal tunnel syndrome  Certain treatments help reduce the pressure on the median nerve and relieve symptoms. Choices for treatment may include one or more of the following:  · Wrist splint. This involves wearing a special brace on the wrist and hand. The splint holds the wrist straight, in a neutral position. This helps keep the carpal tunnel as open as possible.  · Cortisone shots. Cortisone is a medicine that helps reduce swelling. It is injected directly into the wrist. It helps shrink tissues inside the carpal tunnel. This relieves symptoms for a time.  · Pain medicines. You may take over-the-counter or prescription medicines to help reduce swelling and relieve symptoms.  · Surgery. If the condition doesnt respond to other treatments and doesnt go away on its own, you may need surgery. During surgery, the surgeon cuts the transverse carpal ligament to relieve pressure on the median nerve.     When to call your healthcare provider  Call your healthcare provider right away if you have any of these:  · Fever of 100.4°F (38°C) or higher, or as directed  · Symptoms that dont get better, or get worse  · New symptoms   Date Last Reviewed: 3/10/2016  © 7858-8460 [employer]. [address] [place] [address], [place] [address]. All rights reserved. This information is not intended as a substitute for professional medical care. Always follow your healthcare professional's instructions.        Carpal Tunnel Release Surgery  Surgery may be done if your carpal tunnel syndrome (CTS) symptoms become  severe. Or, you may have surgery if no other treatment brings relief. There are 2 types of CTS procedures. You will be told about the one you will have. Youll also be instructed how to prepare for it.      The goals of surgery  Two types of surgery--open and endoscopic--are used to treat CTS.  · With open surgery, your surgeon makes one incision in your palm. Standard surgical tools are used.  · With endoscopic surgery, one or two small incisions may be made in your hand. A scope (with a very small camera attached) and tools are inserted under the carpal ligament. The surgeon then operates while watching images on a video screen. No matter which one you have, the goal remains the same: Your surgeon will relieve pressure on the median nerve. To do this, the transverse carpal ligament is cut (released).  After surgery  If youve had carpal tunnel surgery, you will spend a few hours resting before you go home. The nerve sensation and circulation in your hand will be checked at this time. For the safest healing, keep the following in mind.  · Keep your hand raised above heart level. This will help reduce swelling.  · Limit hand and wrist use as instructed. A wrist brace may be required.  · Take any pain medication as directed.  · Do hand exercises as directed by your surgeon or therapist.  When to call the surgeon  Call your surgeon if you notice any of the following:  · White or pale-blue hand or nails (If you pinch your skin or nail and the color doesnt return)  · Pain that is not relieved by prescribed medicine  · Loss of sensation or excess swelling in hand or fingers  · Fever over 100.4°F (38°C)   Date Last Reviewed: 9/11/2015  © 9639-3927 [employer]. [address] [place] [address]. All rights reserved. This information is not intended as a substitute for professional medical care. Always follow your healthcare professional's instructions.

## 2018-04-19 NOTE — PROGRESS NOTES
[facility] [address] [place] [address]  Phone: [phone]          Full Name: ubaldo allison YOB: 1980  Patient ID: [id number]      Visit Date: 4/19/2018 14:05  Age: 37 Years 8 Months Old  Examining Physician: Mali Dueñas M.D.  Referring Physician:   Reason for Referral: ue numbness      Chief Complaint   Patient presents with    Numbness     both hands with numbness/tingling        HPI: This is a 37 y.o.  female being seen in clinic today for evaluation of chronic bilateral hand numbness/tingling that is worse at night and after being on her computer at work.  She has a history of uncontrolled DM as well.  She has to get up at night, shake her hands, and change their position for some relief.  She has occasional episodes of  strength issues and her right hand is worse than the left.     History obtained from patient    Past family, medical, social, and surgical history reviewed in chart    Review of Systems:     General- denies lethargy, weight change, fever, chills  Head/neck- denies swallowing difficulties  ENT- denies hearing changes  Cardiovascular-denies chest pain  Pulmonary- denies shortness of breath  GI- denies constipation or bowel incontinence  - denies bladder incontinence  Skin- denies wounds or rashes  Musculoskeletal- denies weakness, +pain  Neurologic- +numbness and tingling  Psychiatric- denies depressive or psychotic features, denies anxiety  Lymphatic-denies swelling  Endocrine- denies hypoglycemic symptoms/+DM history  All other pertinent systems negative     Physical Examination:  General: Well developed, well nourished female, NAD  HEENT:NCAT EOMI bilaterally   Pulmonary:Normal respirations    Spinal Examination: CERVICAL  Active ROM is within normal limits.  Inspection: No deformity of spinal alignment.    Spinal Examination: LUMBAR or THORACIC  Active ROM is within normal limits.  Inspection: No deformity of spinal  alignment.      Musculoskeletal Tests:  Phalen:   Elbow compression (ulnar): neg  Tinels at wrist: + on right     Bilateral Upper and Lower Extremities:  Pulses are 2+ at radial bilaterally.  Shoulder/Elbow/Wrist/Hand ROM wnl  Hip/Knee/Ankle ROM   Bilateral Extremities show normal capillary refill.  No signs of cyanosis, rubor, edema, skin changes, or dysvascular changes of appendages.  Nails appear intact.    Neurological Exam:  Cranial Nerves:  II-XII grossly intact    Manual Muscle Testing: (Motor 5=normal)  5/5 strength bilateral upper extremities    No focal atrophy is noted of either upper extremity.    Bilateral Reflexes:1+bic tric br  Webb's response is absent bilaterally.    Sensation: tested to light touch  - intact in arms  Gait: Narrow base and good arm swing.      Entire procedure explained to patient prior to proceeding.  Verbal consent obtained    SNC      Nerve / Sites Rec. Site Onset Lat Peak Lat Amp Segments Distance Velocity     ms ms µV  mm m/s   R Median - Digit II (Antidromic)      Wrist Dig II 3.9 5.8 7.8 Wrist - Dig  36   L Median - Digit II (Antidromic)      Wrist Dig II 3.8 4.6 13.3 Wrist - Dig  37   R Ulnar - Digit V (Antidromic)      Wrist Dig V 2.3 3.0 33.3 Wrist - Dig V 140 61   L Ulnar - Digit V (Antidromic)      Wrist Dig V 2.5 3.4 31.0 Wrist - Dig V 140 56   R Radial - Anatomical snuff box (Forearm)      Forearm Wrist 1.8 2.5 13.3 Forearm - Wrist 100 55   L Radial - Anatomical snuff box (Forearm)      Forearm Wrist 1.8 2.4 13.9 Forearm - Wrist 100 55       MNC      Nerve / Sites Muscle Latency Amplitude Duration Rel Amp Segments Distance Lat Diff Velocity     ms mV ms %  mm ms m/s   R Median - APB      Wrist APB 5.8 6.9 7.2 100 Wrist - APB 80        Elbow APB 10.7 5.7 7.6 82.3 Elbow - Wrist 220 4.9 44   L Median - APB      Wrist APB 4.8 8.9 6.2 100 Wrist - APB 80        Elbow APB 9.2 8.1 6.7 90.9 Elbow - Wrist 220 4.4 50   R Ulnar - ADM      Wrist ADM 3.3 7.9 5.5 100  Wrist - ADM 80        B.Elbow ADM 7.3 7.9 5.9 101 B.Elbow - Wrist 240 4.1 59      A.Elbow ADM 9.5 7.4 5.7 93.6 A.Elbow - B.Elbow 110 2.1 52         A.Elbow - Wrist  6.2    L Ulnar - ADM      Wrist ADM 3.0 8.4 4.9 100 Wrist - ADM 80        B.Elbow ADM 7.2 7.8 5.4 93 B.Elbow - Wrist 240 4.3 56      A.Elbow ADM 8.9 7.4 5.1 95.2 A.Elbow - B.Elbow 100 1.7 60         A.Elbow - Wrist  5.9                                 INTERPRETATION  -Bilateral median motor nerve conduction study showed prolonged latency, normal amplitude, and conduction velocity  -Bilateral median sensory nerve conduction study showed prolonged peak latency and dec amplitude on the right  -Bilateral ulnar motor nerve conduction study showed normal latency, amplitude, and conduction velocity  -Bilateral ulnar sensory nerve conduction study showed normal peak latency and amplitude  -Bilateral radial sensory nerve conduction study showed normal peak latency and amplitude      IMPRESSION  1. ABNORMAL study  2. 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 Follow up with referring provider: Dr. Maisha Bartholomew  2. Handouts on CTS provided. Referred to orthopedics for CTS eval  3. This study is good for one year. If symptoms worsen or do not improve, please re-consult.    Mali Dueñas M.D.  Physical Medicine and Rehab

## 2018-04-23 DIAGNOSIS — M25.532 BILATERAL WRIST PAIN: Primary | ICD-10-CM

## 2018-04-23 DIAGNOSIS — M25.531 BILATERAL WRIST PAIN: Primary | ICD-10-CM

## 2018-04-25 ENCOUNTER — PATIENT MESSAGE (OUTPATIENT)
Dept: DIABETES | Facility: CLINIC | Age: 38
End: 2018-04-25

## 2018-06-18 ENCOUNTER — PATIENT MESSAGE (OUTPATIENT)
Dept: INTERNAL MEDICINE | Facility: CLINIC | Age: 38
End: 2018-06-18

## 2018-08-25 RX ORDER — LANCETS 33 GAUGE
1 EACH MISCELLANEOUS 2 TIMES DAILY
Qty: 100 EACH | Refills: 11 | Status: SHIPPED | OUTPATIENT
Start: 2018-08-25 | End: 2019-03-11 | Stop reason: SDUPTHER

## 2018-10-19 DIAGNOSIS — E11.9 TYPE 2 DIABETES MELLITUS WITHOUT COMPLICATION: ICD-10-CM

## 2018-11-06 RX ORDER — INSULIN ASPART 100 [IU]/ML
INJECTION, SOLUTION INTRAVENOUS; SUBCUTANEOUS
Qty: 21.6 ML | Refills: 1 | Status: SHIPPED | OUTPATIENT
Start: 2018-11-06 | End: 2019-06-20 | Stop reason: SDUPTHER

## 2018-11-06 RX ORDER — INSULIN ASPART 100 [IU]/ML
INJECTION, SOLUTION INTRAVENOUS; SUBCUTANEOUS
Qty: 21.6 ML | Refills: 11 | Status: CANCELLED
Start: 2018-11-06

## 2018-12-10 ENCOUNTER — PATIENT OUTREACH (OUTPATIENT)
Dept: ADMINISTRATIVE | Facility: HOSPITAL | Age: 38
End: 2018-12-10

## 2019-01-31 ENCOUNTER — PATIENT OUTREACH (OUTPATIENT)
Dept: ADMINISTRATIVE | Facility: HOSPITAL | Age: 39
End: 2019-01-31

## 2019-02-19 ENCOUNTER — PATIENT OUTREACH (OUTPATIENT)
Dept: ADMINISTRATIVE | Facility: HOSPITAL | Age: 39
End: 2019-02-19

## 2019-02-19 NOTE — PROGRESS NOTES
Patient return call. Schedule lab on 02/27/2019 at 01:05 pm. Patient in agreement and vocalize understanding. I will send appointment reminder in mail today.

## 2019-02-28 ENCOUNTER — PATIENT OUTREACH (OUTPATIENT)
Dept: ADMINISTRATIVE | Facility: HOSPITAL | Age: 39
End: 2019-02-28

## 2019-02-28 NOTE — PROGRESS NOTES
I have attempted with out success to contact patient to see if she had current  dm eye exam. Left voice mail

## 2019-03-04 ENCOUNTER — PATIENT MESSAGE (OUTPATIENT)
Dept: INTERNAL MEDICINE | Facility: CLINIC | Age: 39
End: 2019-03-04

## 2019-03-04 RX ORDER — DULAGLUTIDE 1.5 MG/.5ML
INJECTION, SOLUTION SUBCUTANEOUS
Qty: 2 SYRINGE | Refills: 3 | OUTPATIENT
Start: 2019-03-04

## 2019-03-05 ENCOUNTER — TELEPHONE (OUTPATIENT)
Dept: INTERNAL MEDICINE | Facility: CLINIC | Age: 39
End: 2019-03-05

## 2019-03-11 RX ORDER — LANCETS 33 GAUGE
1 EACH MISCELLANEOUS 2 TIMES DAILY
Qty: 100 EACH | Refills: 1 | Status: SHIPPED | OUTPATIENT
Start: 2019-03-11 | End: 2019-05-29 | Stop reason: SDUPTHER

## 2019-03-11 RX ORDER — LANCETS 33 GAUGE
1 EACH MISCELLANEOUS 2 TIMES DAILY
Qty: 100 EACH | Refills: 1 | Status: SHIPPED | OUTPATIENT
Start: 2019-03-11 | End: 2019-03-11 | Stop reason: SDUPTHER

## 2019-03-11 RX ORDER — INSULIN PUMP SYRINGE, 3 ML
EACH MISCELLANEOUS
Qty: 1 EACH | Refills: 0 | Status: SHIPPED | OUTPATIENT
Start: 2019-03-11 | End: 2019-05-29 | Stop reason: SDUPTHER

## 2019-03-11 RX ORDER — PEN NEEDLE, DIABETIC 30 GX3/16"
NEEDLE, DISPOSABLE MISCELLANEOUS
Qty: 100 EACH | Refills: 11 | Status: SHIPPED | OUTPATIENT
Start: 2019-03-11 | End: 2020-08-21 | Stop reason: SDUPTHER

## 2019-03-18 ENCOUNTER — TELEPHONE (OUTPATIENT)
Dept: INTERNAL MEDICINE | Facility: CLINIC | Age: 39
End: 2019-03-18

## 2019-03-18 NOTE — TELEPHONE ENCOUNTER
called pt no answer, left message informing pt that provider will be out of office due to  having surgery and appt needed to be rescheduled. Advised to call back to reschedule appt. MELISSA

## 2019-05-15 ENCOUNTER — PATIENT OUTREACH (OUTPATIENT)
Dept: ADMINISTRATIVE | Facility: HOSPITAL | Age: 39
End: 2019-05-15

## 2019-05-28 NOTE — PROGRESS NOTES
Subjective:      Patient ID: Maikol Pacheco is a 38 y.o. female.    PCP: Maisha Bartholomew MD      Maikol Pacheco is a pleasant 38 y.o. female presenting to follow up on diabetes mellitus. She has had diabetes for 6 or more years. Her last visit in Diabetes Management was 03/14/2018. Since that time she has been in her usual state of health without significant hyperglycemia or hypoglycemia. She has not been checking her blood glucose regularly. Also, since that time she has had no improvement in her glycemia with A1c of 11.7%. Will consider outpatient case management or mental health evaluation at next visit.    She denies any hospital admissions, emergency room visits, hypoglycemia, syncope, diaphoresis, chest pain, or dyspnea.    She has gained 3 pounds since last visit. Her BMI is 42.86 kg/m².    Her blood sugar in the clinic today was:   Lab Results   Component Value Date    POCGLU 357 (A) 05/29/2019     Maikol is noncompliant some of the time with DM medications. Misses more than 50% of her injections.    Maikol is noncompliant some of the time with lifestyle modifications to include activity and meal planning.     Diabetes Management Status    Statin: Taking  ACE/ARB: Taking    Screening or Prevention Patient's value Goal Complete/Controlled?   HgA1C Testing and Control   Lab Results   Component Value Date    HGBA1C 11.7 (H) 03/21/2018      Annually/Less than 8% No   Lipid profile : 03/21/2018 Annually Yes   LDL control Lab Results   Component Value Date    LDLCALC 107.6 03/21/2018    Annually/Less than 100 mg/dl  Yes   Nephropathy screening Lab Results   Component Value Date    LABMICR 4.0 03/21/2018     Lab Results   Component Value Date    PROTEINUA Negative 10/28/2016    Annually Yes   Blood pressure BP Readings from Last 1 Encounters:   05/29/19 (!) 138/94    Less than 140/90 Yes   Dilated retinal exam : 01/19/2017 Annually No   Foot exam   : 04/25/2018 Annually Yes         Lab Results  "  Component Value Date    HGBA1C 11.7 (H) 03/21/2018    HGBA1C 10.3 (H) 08/24/2017    HGBA1C 10.7 (H) 06/15/2017     Lab Results   Component Value Date    CPEPTIDE 2.2 12/29/2016    CPEPTIDE 1.7 01/17/2015     Review of Systems   Constitutional: Negative for activity change and unexpected weight change.   Eyes: Negative for visual disturbance.   Respiratory: Negative for chest tightness and shortness of breath.    Cardiovascular: Negative for chest pain.   Gastrointestinal: Negative for constipation and diarrhea.   Endocrine: Negative for cold intolerance, heat intolerance, polydipsia, polyphagia and polyuria.   Genitourinary: Negative for frequency.   Skin: Negative for wound.   Neurological: Negative for numbness.   Psychiatric/Behavioral: Negative for confusion.      Objective:   BP (!) 138/94   Ht 5' 2" (1.575 m)   Wt 106.3 kg (234 lb 5.6 oz)   LMP 05/20/2019   BMI 42.86 kg/m²       Physical Exam   Constitutional: She is oriented to person, place, and time. She appears well-developed and well-nourished. No distress.   Neck: Normal range of motion. Neck supple. No tracheal deviation present. No thyromegaly present.   Cardiovascular: Normal rate, regular rhythm and normal heart sounds.   Pulmonary/Chest: Effort normal and breath sounds normal.   Abdominal: Soft. Bowel sounds are normal.   Musculoskeletal: She exhibits no edema.   Lymphadenopathy:     She has no cervical adenopathy.   Neurological: She is alert and oriented to person, place, and time.   Skin: She is not diaphoretic.   Psychiatric: She has a normal mood and affect.   Nursing note and vitals reviewed.    Assessment:     1. Uncontrolled type 2 diabetes mellitus with diabetic polyneuropathy, with long-term current use of insulin       Plan:   Maikol Pacheco is seen today for   1. Uncontrolled type 2 diabetes mellitus with diabetic polyneuropathy, with long-term current use of insulin        Uncontrolled type 2 diabetes mellitus with diabetic " "polyneuropathy, with long-term current use of insulin  -     POCT Glucose, Hand-Held Device  -     Hemoglobin A1c; Future; Expected date: 05/29/2019  -     TSH; Future; Expected date: 05/29/2019  -     Protein / creatinine ratio, urine; Future  -     Comprehensive metabolic panel; Future; Expected date: 05/29/2019  -     Ambulatory referral to Outpatient Case Management  -     Sammy Patient Entered Glucose    Uncontrolled type 2 diabetes mellitus with diabetic polyneuropathy, with long-term current use of insulin  -     POCT glucose  -  Lack of compliance  - Referral to OPCM Today  - Have intensified insulin therapy today.     I have reviewed your results and they are still quite high. I would like you to adjust "Treating to Target". The treatment will be Insulin and your target will be the Fasting and 2 hour post meal blood sugar. It will work in this manner;    1. Goal for Fasting blood sugar is  mg/dl. I realize that you will need time to adjust to the new levels and presently you may feel too low if you are too aggressive now. So go slow and aim to lower your blood sugar to below 200 then 150 then 100 over several months.    2. Goal for 2 hour post meal blood sugar is below 180 mg/dl, here the same rules apply as in #1.    3. You will check your fasting blood sugar daily, if not where we would like it to be over a 3 day period then that evening we will increase the Levemir dose by 5 units. Then repeat the process over the next 3 days. Remember this is a slow process and take our time getting to goal. But, each week should be better than prior weeks. Blood sugars below 70 are unacceptable and should raise a "RED FLAG" where we may have to reduce our dose of insulin.    4. You will check your post meal glucose daily as well. However, each day you will check a different meal, (ie. Monday-breakfast; Tuesday- lunch; Wednesday- supper, then repeat). If your post meal glucose is not where we would like, " "increase pre-meal insulin by 2 units next time. A word of CAUTION: mealtime insulin is dependant on the size and concentration of your meal content. If not consuming a large meal do not take large dose of insulin. Use the reasonable person rule.     5. If you have any questions please do not hesitate to call.      Intensive insulin Therapy with correction factor:    You are on Intensive insulin therapy with Basal and Bolus insulin. Lantus, Levamir or NPH is your Basal insulin and will help maintain your fasting and between meal sugar. Your fast acting or rescue insulin is either Humalog, Novalog or Regular insulin and will control your post meal sugar.     You will Take 40 units of Levemir twice daily at least 12 hours apart. This will be adjusted up or down depending on your fasting blood sugar before breakfast.    Novolog will follow this pre meal schedule; Correction factor of "2 units per 50 mg/dl" and is based on 30-60 grams of carbohydrates per meal.    If blood sugar is below 70 eat first then check your blood sugar 2 hours later and make correction.  If blood pre-meal sugar is  70 -150 take 16 units of Novolog;  If blood pre-meal sugar is 151-200 take +2 units of Novolog;  If blood pre-meal sugar is 201-250 take +4 units of Novolog;  If blood pre-meal sugar is 251-300 take +6 units of Novolog;  If blood pre-meal sugar is 301-350 take +8 units of Novolog;  If blood pre-meal sugar is 351-400+ take +10 units of Novolog;  Also increase water intake and call for appointment.    A total of 30 minutes was spent in face to face time, of which 50 % was spent in counseling patient on disease process, complications, treatment, and side effects of medications.    The patient was explained the above plan and given opportunity to ask questions.  She understands, chooses and consents to this plan and accepts all the risks, which include but are not limited to the risks mentioned above.   She understands the alternative of " having no testing, interventions or treatments at this time. She left content and without further questions.

## 2019-05-29 ENCOUNTER — OFFICE VISIT (OUTPATIENT)
Dept: DIABETES | Facility: CLINIC | Age: 39
End: 2019-05-29
Payer: COMMERCIAL

## 2019-05-29 ENCOUNTER — TELEPHONE (OUTPATIENT)
Dept: ORTHOPEDICS | Facility: CLINIC | Age: 39
End: 2019-05-29

## 2019-05-29 ENCOUNTER — NUTRITION (OUTPATIENT)
Dept: DIABETES | Facility: CLINIC | Age: 39
End: 2019-05-29
Payer: COMMERCIAL

## 2019-05-29 ENCOUNTER — OFFICE VISIT (OUTPATIENT)
Dept: INTERNAL MEDICINE | Facility: CLINIC | Age: 39
End: 2019-05-29
Payer: COMMERCIAL

## 2019-05-29 VITALS
HEART RATE: 98 BPM | WEIGHT: 233.25 LBS | TEMPERATURE: 97 F | SYSTOLIC BLOOD PRESSURE: 120 MMHG | BODY MASS INDEX: 42.92 KG/M2 | DIASTOLIC BLOOD PRESSURE: 88 MMHG | HEIGHT: 62 IN | OXYGEN SATURATION: 97 %

## 2019-05-29 VITALS
SYSTOLIC BLOOD PRESSURE: 138 MMHG | DIASTOLIC BLOOD PRESSURE: 94 MMHG | HEIGHT: 62 IN | BODY MASS INDEX: 43.13 KG/M2 | WEIGHT: 234.38 LBS

## 2019-05-29 VITALS — HEIGHT: 62 IN | BODY MASS INDEX: 43.13 KG/M2 | WEIGHT: 234.38 LBS

## 2019-05-29 DIAGNOSIS — G56.03 CARPAL TUNNEL SYNDROME, BILATERAL: ICD-10-CM

## 2019-05-29 DIAGNOSIS — E78.2 MIXED HYPERLIPIDEMIA: ICD-10-CM

## 2019-05-29 DIAGNOSIS — J30.1 SEASONAL ALLERGIC RHINITIS DUE TO POLLEN: ICD-10-CM

## 2019-05-29 DIAGNOSIS — E11.59 HYPERTENSION ASSOCIATED WITH DIABETES: Chronic | ICD-10-CM

## 2019-05-29 DIAGNOSIS — E78.5 HYPERLIPIDEMIA ASSOCIATED WITH TYPE 2 DIABETES MELLITUS: Chronic | ICD-10-CM

## 2019-05-29 DIAGNOSIS — E11.42 DIABETIC POLYNEUROPATHY ASSOCIATED WITH TYPE 2 DIABETES MELLITUS: Primary | ICD-10-CM

## 2019-05-29 DIAGNOSIS — E11.69 HYPERLIPIDEMIA ASSOCIATED WITH TYPE 2 DIABETES MELLITUS: Chronic | ICD-10-CM

## 2019-05-29 DIAGNOSIS — I15.2 HYPERTENSION ASSOCIATED WITH DIABETES: Chronic | ICD-10-CM

## 2019-05-29 DIAGNOSIS — Z00.00 ROUTINE GENERAL MEDICAL EXAMINATION AT A HEALTH CARE FACILITY: Primary | ICD-10-CM

## 2019-05-29 DIAGNOSIS — E66.01 MORBID OBESITY WITH BMI OF 40.0-44.9, ADULT: ICD-10-CM

## 2019-05-29 LAB — GLUCOSE SERPL-MCNC: 357 MG/DL (ref 70–110)

## 2019-05-29 PROCEDURE — 99395 PR PREVENTIVE VISIT,EST,18-39: ICD-10-PCS | Mod: S$GLB,,, | Performed by: FAMILY MEDICINE

## 2019-05-29 PROCEDURE — 99999 PR PBB SHADOW E&M-EST. PATIENT-LVL IV: ICD-10-PCS | Mod: PBBFAC,,, | Performed by: FAMILY MEDICINE

## 2019-05-29 PROCEDURE — 3074F SYST BP LT 130 MM HG: CPT | Mod: CPTII,S$GLB,, | Performed by: FAMILY MEDICINE

## 2019-05-29 PROCEDURE — 3079F DIAST BP 80-89 MM HG: CPT | Mod: CPTII,S$GLB,, | Performed by: FAMILY MEDICINE

## 2019-05-29 PROCEDURE — 3008F BODY MASS INDEX DOCD: CPT | Mod: CPTII,S$GLB,, | Performed by: PHYSICIAN ASSISTANT

## 2019-05-29 PROCEDURE — 99999 PR PBB SHADOW E&M-EST. PATIENT-LVL III: CPT | Mod: PBBFAC,,, | Performed by: DIETITIAN, REGISTERED

## 2019-05-29 PROCEDURE — 99999 PR PBB SHADOW E&M-EST. PATIENT-LVL III: ICD-10-PCS | Mod: PBBFAC,,, | Performed by: PHYSICIAN ASSISTANT

## 2019-05-29 PROCEDURE — 82962 GLUCOSE BLOOD TEST: CPT | Mod: S$GLB,,, | Performed by: PHYSICIAN ASSISTANT

## 2019-05-29 PROCEDURE — 3074F PR MOST RECENT SYSTOLIC BLOOD PRESSURE < 130 MM HG: ICD-10-PCS | Mod: CPTII,S$GLB,, | Performed by: FAMILY MEDICINE

## 2019-05-29 PROCEDURE — 99395 PREV VISIT EST AGE 18-39: CPT | Mod: S$GLB,,, | Performed by: FAMILY MEDICINE

## 2019-05-29 PROCEDURE — 3075F SYST BP GE 130 - 139MM HG: CPT | Mod: CPTII,S$GLB,, | Performed by: PHYSICIAN ASSISTANT

## 2019-05-29 PROCEDURE — 99999 PR PBB SHADOW E&M-EST. PATIENT-LVL IV: CPT | Mod: PBBFAC,,, | Performed by: FAMILY MEDICINE

## 2019-05-29 PROCEDURE — G0108 DIAB MANAGE TRN  PER INDIV: HCPCS | Mod: S$GLB,,, | Performed by: DIETITIAN, REGISTERED

## 2019-05-29 PROCEDURE — 3079F PR MOST RECENT DIASTOLIC BLOOD PRESSURE 80-89 MM HG: ICD-10-PCS | Mod: CPTII,S$GLB,, | Performed by: FAMILY MEDICINE

## 2019-05-29 PROCEDURE — 82962 POCT GLUCOSE, HAND-HELD DEVICE: ICD-10-PCS | Mod: S$GLB,,, | Performed by: PHYSICIAN ASSISTANT

## 2019-05-29 PROCEDURE — 3080F PR MOST RECENT DIASTOLIC BLOOD PRESSURE >= 90 MM HG: ICD-10-PCS | Mod: CPTII,S$GLB,, | Performed by: PHYSICIAN ASSISTANT

## 2019-05-29 PROCEDURE — 99999 PR PBB SHADOW E&M-EST. PATIENT-LVL III: CPT | Mod: PBBFAC,,, | Performed by: PHYSICIAN ASSISTANT

## 2019-05-29 PROCEDURE — G0108 PR DIAB MANAGE TRN  PER INDIV: ICD-10-PCS | Mod: S$GLB,,, | Performed by: DIETITIAN, REGISTERED

## 2019-05-29 PROCEDURE — 99999 PR PBB SHADOW E&M-EST. PATIENT-LVL III: ICD-10-PCS | Mod: PBBFAC,,, | Performed by: DIETITIAN, REGISTERED

## 2019-05-29 PROCEDURE — 3008F PR BODY MASS INDEX (BMI) DOCUMENTED: ICD-10-PCS | Mod: CPTII,S$GLB,, | Performed by: PHYSICIAN ASSISTANT

## 2019-05-29 PROCEDURE — 3080F DIAST BP >= 90 MM HG: CPT | Mod: CPTII,S$GLB,, | Performed by: PHYSICIAN ASSISTANT

## 2019-05-29 PROCEDURE — 99214 OFFICE O/P EST MOD 30 MIN: CPT | Mod: S$GLB,,, | Performed by: PHYSICIAN ASSISTANT

## 2019-05-29 PROCEDURE — 3075F PR MOST RECENT SYSTOLIC BLOOD PRESS GE 130-139MM HG: ICD-10-PCS | Mod: CPTII,S$GLB,, | Performed by: PHYSICIAN ASSISTANT

## 2019-05-29 PROCEDURE — 99214 PR OFFICE/OUTPT VISIT, EST, LEVL IV, 30-39 MIN: ICD-10-PCS | Mod: S$GLB,,, | Performed by: PHYSICIAN ASSISTANT

## 2019-05-29 RX ORDER — PIOGLITAZONEHYDROCHLORIDE 15 MG/1
15 TABLET ORAL DAILY
Qty: 30 TABLET | Refills: 11 | Status: SHIPPED | OUTPATIENT
Start: 2019-05-29 | End: 2019-10-14 | Stop reason: SDUPTHER

## 2019-05-29 RX ORDER — INSULIN PUMP SYRINGE, 3 ML
EACH MISCELLANEOUS
Qty: 1 EACH | Refills: 0 | Status: SHIPPED | OUTPATIENT
Start: 2019-05-29

## 2019-05-29 RX ORDER — LANCETS 33 GAUGE
1 EACH MISCELLANEOUS 2 TIMES DAILY
Qty: 100 EACH | Refills: 1 | Status: SHIPPED | OUTPATIENT
Start: 2019-05-29

## 2019-05-29 RX ORDER — FLUTICASONE PROPIONATE 50 MCG
2 SPRAY, SUSPENSION (ML) NASAL DAILY
Qty: 16 G | Refills: 1 | Status: SHIPPED | OUTPATIENT
Start: 2019-05-29 | End: 2019-06-20 | Stop reason: SDUPTHER

## 2019-05-29 RX ORDER — SIMVASTATIN 40 MG/1
40 TABLET, FILM COATED ORAL NIGHTLY
Qty: 90 TABLET | Refills: 3 | Status: SHIPPED | OUTPATIENT
Start: 2019-05-29 | End: 2020-05-15 | Stop reason: SDUPTHER

## 2019-05-29 NOTE — LETTER
May 29, 2019     Brennan Granados Jr., PA-C  [address]  [place] [address]             [facility]  [address] [facility] [address]  Phone: [phone]  Fax: [phone]   Patient: Maikol Pacheco   MR Number: [id number]   YOB: 1980   Date of Visit: 5/29/2019       Dear Dr. Granados:    Thank you for referring Maikol Pacheco to me for evaluation. Below are the relevant portions of my assessment and plan of care.    Discussed benefits/application of Dexcom CGMS; AOB signed for processing. Please assist w/ approval if you agree.      If you have questions, please do not hesitate to call me. I look forward to following Maikol along with you.    Sincerely,      Jesika Weber RD, CDE           CC  Maisha Bartholomew MD

## 2019-05-29 NOTE — PROGRESS NOTES
Subjective:       Patient ID: Maikol Pacheco is a 38 y.o. female.    Chief Complaint: Diabetes and Sleep Apnea    38-year-old  female patient with Patient Active Problem List:     Hyperlipidemia associated with type 2 diabetes mellitus     Hypertension associated with diabetes     Anemia     Uncontrolled type 2 diabetes mellitus with diabetic polyneuropathy, with long-term current use of insulin     Morbid obesity with BMI of 40.0-44.9, adult  Here for routine annual physicals and for follow-up on chronic medical conditions.  Patient reports that she started eating healthy and has been working with diet and exercise and has lost few lb, patient still has elevated blood glucose levels and has been working with diabetes clinic closely, Has appointment with diabetes clinic today\patient reported that she was out of her diabetes medication for the past 1 week, but has been working on lowering blood glucose levels  Denies any polyuria polydipsia polyphagia, tingling or numbness sensation to extremities.   Patient reports that she has been having bilateral hand pains worse on the left side, and has had nerve conduction studies in the past showing carpal tunnel syndrome.   Patient would like to get further evaluated  Denies any chest pain or difficulty breathing  Blood glucose levels has been running in the range of 300s since not taking medication  Has been having congestion and runny nose    Review of Systems   Constitutional: Negative for fatigue and fever.   HENT: Positive for congestion and rhinorrhea. Negative for postnasal drip.    Eyes: Negative for visual disturbance.   Respiratory: Negative for shortness of breath.    Cardiovascular: Negative for chest pain and leg swelling.   Gastrointestinal: Negative for abdominal pain, nausea and vomiting.   Endocrine: Positive for polyuria. Negative for polydipsia and polyphagia.   Musculoskeletal: Positive for arthralgias and myalgias. Negative for  "joint swelling.   Skin: Negative for rash.   Neurological: Positive for numbness. Negative for weakness, light-headedness and headaches.   Psychiatric/Behavioral: Negative for sleep disturbance.         /88 (BP Location: Right arm, Patient Position: Sitting, BP Method: Large (Manual))   Pulse 98   Temp 96.5 °F (35.8 °C) (Tympanic)   Ht 5' 2" (1.575 m)   Wt 105.8 kg (233 lb 4 oz)   LMP 05/20/2019   SpO2 97%   BMI 42.66 kg/m²   Objective:      Physical Exam   Constitutional: She is oriented to person, place, and time. She appears well-developed and well-nourished.   HENT:   Head: Normocephalic and atraumatic.   Mouth/Throat: Oropharynx is clear and moist.   Rhinorrhea noted with swollen nasal mucosa   Neck: Neck supple.   Cardiovascular: Normal rate, regular rhythm and normal heart sounds.   No murmur heard.  Pulmonary/Chest: Effort normal and breath sounds normal. She has no wheezes.   Abdominal: Soft. Bowel sounds are normal. There is no tenderness.   Musculoskeletal: She exhibits tenderness. She exhibits no edema.   Positive for bilateral wrist tenderness more on the left side   Lymphadenopathy:     She has no cervical adenopathy.   Neurological: She is alert and oriented to person, place, and time.   Skin: Skin is warm and dry. No rash noted.   Psychiatric: She has a normal mood and affect.         Assessment/Plan:   1. Routine general medical examination at a health care facility  - CBC auto differential; Future  - Lipid panel; Future  - Urinalysis; Future  Vital signs stable today.  Clinical exam stable.  Will check fasting labs tomorrow  Strict lifestyle changes recommended with 1800 ADA low-fat and low-cholesterol diet and exercise 30 min daily    2. Hyperlipidemia associated with type 2 diabetes mellitus  - Lipid panel; Future  Currently on simvastatin 40 mg daily    3. Hypertension associated with diabetes  - Lipid panel; Future  - Urinalysis; Future  Blood pressure is stable today currently on " ramipril 5 mg daily    4. Uncontrolled type 2 diabetes mellitus with diabetic polyneuropathy, with long-term current use of insulin  - Lipid panel; Future  Followed by diabetes clinic closely  Currently has been out of medications but refills has been given by diabetes clinic today  Currently taking NovoLog FlexPen 24 units 3 times daily, Amaryl 4 mg twice, daily tulicity weekly , Levemir 20 units twice daily and Actos 15 mg daily  Currently taking gabapentin 300 mg daily for neuropathy  Patient had diabetic foot exam today  Scheduled for eye exam soon  Strict lifestyle changes recommended with 1800 ADA low-fat and low-cholesterol diet and exercise 30 min daily  Patient was advised to work on lowering blood glucose levels and follow up with me in 3 months    5. Morbid obesity with BMI of 40.0-44.9, adult   Lifestyle modifications recommended to lose weight with BMI 42    6. Seasonal allergic rhinitis due to pollen  - fluticasone propionate (FLONASE) 50 mcg/actuation nasal spray; 2 sprays (100 mcg total) by Each Nare route once daily.  Dispense: 16 g; Refill: 1  Flonase prescribed today for symptomatic relief    7. Carpal tunnel syndrome, bilateral  - Ambulatory consult to Orthopedics  Will refer to Orthopedics for further evaluation for carpal tunnel, was symptoms on the left side

## 2019-05-29 NOTE — PROGRESS NOTES
Diabetes Education  Author: Jesika Weber RD, CDE  Date: 5/29/2019    Diabetes Care Management Summary  Diabetes Education Record Assessment/Progress: Initial  Current Diabetes Risk Level: High     Last A1c:   Lab Results   Component Value Date    HGBA1C 11.7 (H) 03/21/2018     Last Visit with Diabetes Educator: : 09/28/2017  Last OPCM Referral: : Not Found   Enrolled in OPCM: No    Diabetes Type  Diabetes Type : Type II    Diabetes History  Diabetes Diagnosis: 5-10 years  Current Treatment: Diet, Exercise, Oral Medication, Insulin(Levemir 20units twice daily, Novolog 24 ac, Amaryl 4mg twice daily, Actos 15mg daily)  Reviewed Problem List with Patient: Yes    Health Maintenance was reviewed today with patient. Discussed with patient importance of routine eye exams, foot exams/foot care, blood work (i.e.: A1c, microalbumin, and lipid), dental visits, yearly flu vaccine, and pneumonia vaccine as indicated by PCP. Patient verbalized understanding.     Health Maintenance Topics with due status: Not Due       Topic Last Completion Date    TETANUS VACCINE 04/11/2014    Pap Smear with HPV Cotest 12/09/2016    Influenza Vaccine 10/02/2017     Health Maintenance Due   Topic Date Due    Eye Exam  01/19/2018    Hemoglobin A1c  09/21/2018    Lipid Panel  03/21/2019    Foot Exam  04/25/2019     Nutrition  Meal Planning: (Usual intake ~1447-7614 cals/d, recently reduced to ~4641-4683 cals/d. Pt working to reduce carb, fat and sodium from regular desserts, starches. )  Meal Plan 24 Hour Recall - Breakfast: 1/2 sausage biscuit from [employer] OR 2boiled eggs, turkey sausage - tea (splenda), water   Meal Plan 24 Hour Recall - Lunch: salad (cucumber, tomato, onion, egg, ham, lettuce, dressing-lite ranch/italian) OR bkd pro (turkey grd beef hamb, lean pork, chix) w/ steamed veggies (trying limting rice/pasta)  Meal Plan 24 Hour Recall - Dinner: same lunch  Meal Plan 24 Hour Recall - Snack: popcorn (lite)    Monitoring   Self  Monitoring : Per recall, fst BG 150s; pm 180s.    Blood Glucose Logs: No  In the last month, how often have you had a low blood sugar reaction?: never    Exercise   Exercise Type: (Walking, attending gym 45-60min 3d/wk)    Current Diabetes Treatment   Current Treatment: Diet, Exercise, Oral Medication, Insulin(Levemir 20units twice daily, Novolog 24 ac, Amaryl 4mg twice daily, Actos 15mg daily)    Social History  Preferred Learning Method: Face to Face  Primary Support: Self  Smoking Status: Never a Smoker  Alcohol Use: Never    PHQ-2 Total Score: 4       DDS-2 Score  ( > 3 = SIGNIFICANT DISTRESS): 2.5        Barriers to Change  Barriers to Change: None  Learning Challenges : None    Readiness to Learn   Readiness to Learn : Eager    Cultural Influences  Cultural Influences: No    Diabetes Education Assessment/Progress  Diabetes Disease Process (diabetes disease process and treatment options): Discussion, Individual Session, Demonstrates Understanding/Competency(verbalizes/demonstrates), Written Materials Provided  Nutrition (Incorporating nutritional management into one's lifestyle): Discussion, Individual Session, Demonstrates Understanding/Competency (verbalizes/demonstrates), Written Materials Provided  Physical Activity (incorporating physical activity into one's lifestyle): Discussion, Individual Session, Demonstrates Understanding/Competency (verbalizes/demonstrates), Written Materials Provided  Medications (states correct name, dose, onset, peak, duration, side effects & timing of meds): Discussion, Individual Session, Demonstrates Understanding/Competency(verbalizes/demonstrates), Written Materials Provided  Monitoring (monitoring blood glucose/other parameters & using results): Discussion, Individual Session, Demonstrates Understanding/Competency (verbalizes/demonstrates), Written Materials Provided  Acute Complications (preventing, detecting, and treating acute complications): Discussion, Individual  Session, Demonstrates Understanding/Competency (verbalizes/demonstrates), Written Materials Provided  Chronic Complications (preventing, detecting, and treating chronic complications): Discussion, Individual Session, Demonstrates Understanding/Competency (verbalizes/demonstrates), Written Materials Provided  Clinical (diabetes, other pertinent medical history, and relevant comorbidities reviewed during visit): Discussion, Individual Session, Demonstrates Understanding/Competency (verbalizes/demonstrates)  Cognitive (knowledge of self-management skills, functional health literacy): Discussion, Individual Session, Demonstrates Understanding/Competency (verbalizes/demonstrates)  Psychosocial (emotional response to diabetes): Discussion, Individual Session, Demonstrates Understanding/Competency (verbalizes/demonstrates)  Diabetes Distress and Support Systems: Discussion, Individual Session, Demonstrates Understanding/Competency (verbalizes/demonstrates)  Behavioral (readiness for change, lifestyle practices, self-care behaviors): Discussion, Individual Session, Demonstrates Understanding/Competency (verbalizes/demonstrates)   Protective Sensation (w/ 10 gram monofilament):  Right: Intact  Left: Intact    Visual Inspection:  Normal -  Bilateral    Pedal Pulses:   Right: Present  Left: Present    Posterior tibialis:   Right:Present  Left: Present    Goals  Patient has selected/evaluated goals during today's session: Yes, selected  Healthy Eating: Set(use meal plan - carb portions/spacing)  Start Date: 05/29/19  Target Date: 06/26/19  Physical Activity: Set(150min/wk - gym program)  Start Date: 05/29/19  Target Date: 06/26/19  Monitoring: Set(continue test BG 4x/d - fst, ac, bed)  Start Date: 05/29/19  Target Date: 06/26/19      Diabetes Care Plan/Intervention  Education Plan/Intervention: Individual Follow-Up DSMT, Endocrine Provider Visit Set Up(Pt seeing Priscila today for additional medical mgmt. Discussed  benefits/application of Dexcom CGMS; AOB signed for processing. ) Encouraged current lifestyle improvements and provided additional resources, meal planning tools to support. Pt may benefit from IC ratio to improve bolus doses but isn't currently carb ctg, will eval further at fu visit. Will coord annual eye exam. Reviewed daily foot care and self-inspection.    Diabetes Meal Plan  Restrictions: Low Fat, Low Sodium  Calories: 1200, 1400  Carbohydrate Per Meal: 20-30g, 30-45g  Carbohydrate Per Snack : 15-20g, 7-15g    Today's Self-Management Care Plan was developed with the patient's input and is based on barriers identified during today's assessment.    The long and short-term goals in the care plan were written with the patient/caregiver's input. The patient has agreed to work toward these goals to improve her overall diabetes control.      The patient received a copy of today's self-management plan and verbalized understanding of the care plan, goals, and all of today's instructions.      The patient was encouraged to communicate with her physician and care team regarding her condition(s) and treatment.  I provided the patient with my contact information today and encouraged her to contact me via phone or patient portal as needed.     Education Units of Time   Time Spent: 60 min

## 2019-05-30 ENCOUNTER — LAB VISIT (OUTPATIENT)
Dept: LAB | Facility: HOSPITAL | Age: 39
End: 2019-05-30
Attending: FAMILY MEDICINE
Payer: COMMERCIAL

## 2019-05-30 DIAGNOSIS — E11.59 HYPERTENSION ASSOCIATED WITH DIABETES: Chronic | ICD-10-CM

## 2019-05-30 DIAGNOSIS — Z00.00 ROUTINE GENERAL MEDICAL EXAMINATION AT A HEALTH CARE FACILITY: ICD-10-CM

## 2019-05-30 DIAGNOSIS — I15.2 HYPERTENSION ASSOCIATED WITH DIABETES: Chronic | ICD-10-CM

## 2019-05-30 DIAGNOSIS — E78.5 HYPERLIPIDEMIA ASSOCIATED WITH TYPE 2 DIABETES MELLITUS: Chronic | ICD-10-CM

## 2019-05-30 DIAGNOSIS — E11.69 HYPERLIPIDEMIA ASSOCIATED WITH TYPE 2 DIABETES MELLITUS: Chronic | ICD-10-CM

## 2019-05-30 LAB
BASOPHILS # BLD AUTO: 0.02 K/UL (ref 0–0.2)
BASOPHILS NFR BLD: 0.3 % (ref 0–1.9)
DIFFERENTIAL METHOD: ABNORMAL
EOSINOPHIL # BLD AUTO: 0.1 K/UL (ref 0–0.5)
EOSINOPHIL NFR BLD: 1.4 % (ref 0–8)
ERYTHROCYTE [DISTWIDTH] IN BLOOD BY AUTOMATED COUNT: 13.9 % (ref 11.5–14.5)
HCT VFR BLD AUTO: 37.3 % (ref 37–48.5)
HGB BLD-MCNC: 12 G/DL (ref 12–16)
IMM GRANULOCYTES # BLD AUTO: 0.01 K/UL (ref 0–0.04)
IMM GRANULOCYTES NFR BLD AUTO: 0.2 % (ref 0–0.5)
LYMPHOCYTES # BLD AUTO: 2.9 K/UL (ref 1–4.8)
LYMPHOCYTES NFR BLD: 50.1 % (ref 18–48)
MCH RBC QN AUTO: 27.3 PG (ref 27–31)
MCHC RBC AUTO-ENTMCNC: 32.2 G/DL (ref 32–36)
MCV RBC AUTO: 85 FL (ref 82–98)
MONOCYTES # BLD AUTO: 0.5 K/UL (ref 0.3–1)
MONOCYTES NFR BLD: 9.2 % (ref 4–15)
NEUTROPHILS # BLD AUTO: 2.3 K/UL (ref 1.8–7.7)
NEUTROPHILS NFR BLD: 38.8 % (ref 38–73)
NRBC BLD-RTO: 0 /100 WBC
PLATELET # BLD AUTO: 300 K/UL (ref 150–350)
PMV BLD AUTO: 12.2 FL (ref 9.2–12.9)
RBC # BLD AUTO: 4.39 M/UL (ref 4–5.4)
WBC # BLD AUTO: 5.87 K/UL (ref 3.9–12.7)

## 2019-05-30 PROCEDURE — 85025 COMPLETE CBC W/AUTO DIFF WBC: CPT

## 2019-05-30 PROCEDURE — 80061 LIPID PANEL: CPT

## 2019-05-30 PROCEDURE — 36415 COLL VENOUS BLD VENIPUNCTURE: CPT

## 2019-05-31 ENCOUNTER — SSC ENCOUNTER (OUTPATIENT)
Dept: ADMINISTRATIVE | Facility: OTHER | Age: 39
End: 2019-05-31

## 2019-05-31 LAB
CHOLEST SERPL-MCNC: 175 MG/DL (ref 120–199)
CHOLEST/HDLC SERPL: 4 {RATIO} (ref 2–5)
HDLC SERPL-MCNC: 44 MG/DL (ref 40–75)
HDLC SERPL: 25.1 % (ref 20–50)
LDLC SERPL CALC-MCNC: 110.2 MG/DL (ref 63–159)
NONHDLC SERPL-MCNC: 131 MG/DL
TRIGL SERPL-MCNC: 104 MG/DL (ref 30–150)

## 2019-05-31 NOTE — PROGRESS NOTES
Please note the following patient's information has been forwarded to [facility] for case management or .    Please contact Outpatient Care Management with any questions (ext. [phone])    Thank you,    Oly Coulter, JD [facility]  Outpatient Case Mgmnt  [phone]

## 2019-06-04 DIAGNOSIS — M25.531 PAIN IN BOTH WRISTS: Primary | ICD-10-CM

## 2019-06-04 DIAGNOSIS — M25.532 PAIN IN BOTH WRISTS: Primary | ICD-10-CM

## 2019-06-05 ENCOUNTER — OFFICE VISIT (OUTPATIENT)
Dept: ORTHOPEDICS | Facility: CLINIC | Age: 39
End: 2019-06-05
Payer: COMMERCIAL

## 2019-06-05 ENCOUNTER — HOSPITAL ENCOUNTER (OUTPATIENT)
Dept: RADIOLOGY | Facility: HOSPITAL | Age: 39
Discharge: HOME | End: 2019-06-05
Attending: PHYSICIAN ASSISTANT
Payer: COMMERCIAL

## 2019-06-05 ENCOUNTER — OFFICE VISIT (OUTPATIENT)
Dept: OPHTHALMOLOGY | Facility: CLINIC | Age: 39
End: 2019-06-05
Payer: COMMERCIAL

## 2019-06-05 VITALS
SYSTOLIC BLOOD PRESSURE: 138 MMHG | WEIGHT: 233.25 LBS | BODY MASS INDEX: 42.92 KG/M2 | HEART RATE: 90 BPM | HEIGHT: 62 IN | DIASTOLIC BLOOD PRESSURE: 93 MMHG

## 2019-06-05 DIAGNOSIS — G56.03 BILATERAL CARPAL TUNNEL SYNDROME: Primary | ICD-10-CM

## 2019-06-05 DIAGNOSIS — M25.531 PAIN IN BOTH WRISTS: ICD-10-CM

## 2019-06-05 DIAGNOSIS — H52.13 MYOPIA, BILATERAL: ICD-10-CM

## 2019-06-05 DIAGNOSIS — M25.532 PAIN IN BOTH WRISTS: ICD-10-CM

## 2019-06-05 DIAGNOSIS — E11.9 DIABETES MELLITUS TYPE 2 WITHOUT RETINOPATHY: Primary | ICD-10-CM

## 2019-06-05 PROCEDURE — 3080F DIAST BP >= 90 MM HG: CPT | Mod: CPTII,S$GLB,, | Performed by: PHYSICIAN ASSISTANT

## 2019-06-05 PROCEDURE — 92015 DETERMINE REFRACTIVE STATE: CPT | Mod: S$GLB,,, | Performed by: OPTOMETRIST

## 2019-06-05 PROCEDURE — 73110 X-RAY EXAM OF WRIST: CPT | Mod: 26,RT,, | Performed by: RADIOLOGY

## 2019-06-05 PROCEDURE — 99204 PR OFFICE/OUTPT VISIT, NEW, LEVL IV, 45-59 MIN: ICD-10-PCS | Mod: S$GLB,,, | Performed by: PHYSICIAN ASSISTANT

## 2019-06-05 PROCEDURE — 73110 X-RAY EXAM OF WRIST: CPT | Mod: 50,TC

## 2019-06-05 PROCEDURE — 92014 PR EYE EXAM, EST PATIENT,COMPREHESV: ICD-10-PCS | Mod: S$GLB,,, | Performed by: OPTOMETRIST

## 2019-06-05 PROCEDURE — 3008F BODY MASS INDEX DOCD: CPT | Mod: CPTII,S$GLB,, | Performed by: PHYSICIAN ASSISTANT

## 2019-06-05 PROCEDURE — 99999 PR PBB SHADOW E&M-EST. PATIENT-LVL III: CPT | Mod: PBBFAC,,, | Performed by: PHYSICIAN ASSISTANT

## 2019-06-05 PROCEDURE — 99999 PR PBB SHADOW E&M-EST. PATIENT-LVL III: ICD-10-PCS | Mod: PBBFAC,,, | Performed by: PHYSICIAN ASSISTANT

## 2019-06-05 PROCEDURE — 3008F PR BODY MASS INDEX (BMI) DOCUMENTED: ICD-10-PCS | Mod: CPTII,S$GLB,, | Performed by: PHYSICIAN ASSISTANT

## 2019-06-05 PROCEDURE — 3080F PR MOST RECENT DIASTOLIC BLOOD PRESSURE >= 90 MM HG: ICD-10-PCS | Mod: CPTII,S$GLB,, | Performed by: PHYSICIAN ASSISTANT

## 2019-06-05 PROCEDURE — 73110 X-RAY EXAM OF WRIST: CPT | Mod: 26,LT,, | Performed by: RADIOLOGY

## 2019-06-05 PROCEDURE — 92015 PR REFRACTION: ICD-10-PCS | Mod: S$GLB,,, | Performed by: OPTOMETRIST

## 2019-06-05 PROCEDURE — 92014 COMPRE OPH EXAM EST PT 1/>: CPT | Mod: S$GLB,,, | Performed by: OPTOMETRIST

## 2019-06-05 PROCEDURE — 3075F PR MOST RECENT SYSTOLIC BLOOD PRESS GE 130-139MM HG: ICD-10-PCS | Mod: CPTII,S$GLB,, | Performed by: PHYSICIAN ASSISTANT

## 2019-06-05 PROCEDURE — 73110 XR WRIST COMPLETE 3 VIEWS BILATERAL: ICD-10-PCS | Mod: 26,RT,, | Performed by: RADIOLOGY

## 2019-06-05 PROCEDURE — 99999 PR PBB SHADOW E&M-EST. PATIENT-LVL I: ICD-10-PCS | Mod: PBBFAC,,, | Performed by: OPTOMETRIST

## 2019-06-05 PROCEDURE — 3075F SYST BP GE 130 - 139MM HG: CPT | Mod: CPTII,S$GLB,, | Performed by: PHYSICIAN ASSISTANT

## 2019-06-05 PROCEDURE — 99999 PR PBB SHADOW E&M-EST. PATIENT-LVL I: CPT | Mod: PBBFAC,,, | Performed by: OPTOMETRIST

## 2019-06-05 PROCEDURE — 99204 OFFICE O/P NEW MOD 45 MIN: CPT | Mod: S$GLB,,, | Performed by: PHYSICIAN ASSISTANT

## 2019-06-05 NOTE — PROGRESS NOTES
Subjective:      Patient ID: Maikol Pacheco is a 38 y.o. female.    Chief Complaint: Pain of the Left Hand and Pain of the Right Hand      HPI: Maikol Pacheco  is a 38 y.o. female who c/o Pain of the Left Hand and Pain of the Right Hand   for duration of more than 2 years.  Pain level 10/10.  Quality is aching, sharp, burning, shooting.  She complains of numbness and tingling in the bilateral hands.  She states the right hand use to be worse than the left but now the left is more bothersome to her.  Alleviating factors include nothing.  She has tried carpal tunnel braces without any relief.  Aggravating factors include activity as well as nighttime.    Past Medical History:   Diagnosis Date    Anemia     Diabetes mellitus type II 2008    BS- 400's last week    Gastroparesis     Hyperlipidemia     Hypertension     Morbid obesity      Past Surgical History:   Procedure Laterality Date    TONSILLECTOMY       Family History   Problem Relation Age of Onset    Hyperlipidemia Mother     Diabetes Maternal Grandfather     Cancer Maternal Aunt         breast    Diabetes Maternal Grandmother     Stroke Maternal Grandmother     Hypertension Other      Social History     Socioeconomic History    Marital status: Single     Spouse name: Not on file    Number of children: 0    Years of education: Not on file    Highest education level: Not on file   Occupational History     Employer: [employer]   Social Needs    Financial resource strain: Not on file    Food insecurity:     Worry: Not on file     Inability: Not on file    Transportation needs:     Medical: Not on file     Non-medical: Not on file   Tobacco Use    Smoking status: Never Smoker    Smokeless tobacco: Never Used   Substance and Sexual Activity    Alcohol use: Yes     Comment: socially    Drug use: No    Sexual activity: Yes     Partners: Male     Birth control/protection: Condom   Lifestyle    Physical activity:     Days per week:  Not on file     Minutes per session: Not on file    Stress: Not on file   Relationships    Social connections:     Talks on phone: Not on file     Gets together: Not on file     Attends Zoroastrianism service: Not on file     Active member of club or organization: Not on file     Attends meetings of clubs or organizations: Not on file     Relationship status: Not on file   Other Topics Concern    Not on file   Social History Narrative    Single. Lives with mom. Has no children. 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      Medication List with Changes/Refills   Current Medications    ALBUTEROL 90 MCG/ACTUATION INHALER    Inhale 2 puffs into the lungs every 4 (four) hours as needed for Wheezing or Shortness of Breath (Or Cough).    ARM BRACE MISC    To apply to bilateral wrists    BLOOD SUGAR DIAGNOSTIC STRP    1 strip by Misc.(Non-Drug; Combo Route) route 2 (two) times daily. Test strips covered by insurance    BLOOD-GLUCOSE METER KIT    Blood glucose Meter kit covered by insurance. Use as instructed    DULAGLUTIDE (TRULICITY) 1.5 MG/0.5 ML PNIJ    Inject 1.5 mg into the skin every 7 days.    FERROUS SULFATE 325 MG (65 MG IRON) TAB TABLET    Take 325 mg by mouth once daily.     FLUTICASONE PROPIONATE (FLONASE) 50 MCG/ACTUATION NASAL SPRAY    2 sprays (100 mcg total) by Each Nare route once daily.    GABAPENTIN (NEURONTIN) 300 MG CAPSULE    Take 1 capsule (300 mg total) by mouth every evening.    GLIMEPIRIDE (AMARYL) 4 MG TABLET    TAKE 1 TABLET (4 MG TOTAL) BY MOUTH 2 (TWO) TIMES DAILY.    INSULIN ASPART U-100 (NOVOLOG FLEXPEN U-100 INSULIN) 100 UNIT/ML INPN PEN    Titrate up to 24 units subcutaneously three times a day 10-15 min before meals as directed in after visit summary.    INSULIN DETEMIR U-100 (LEVEMIR FLEXTOUCH U-100 INSULN) 100 UNIT/ML (3 ML) SUBQ INPN PEN    Inject 20 Units into the skin 2 (two) times daily.    LANCETS 33 GAUGE MISC    1 lancet by Misc.(Non-Drug; Combo Route) route 2 (two) times  "daily. Covered by insurance    PEN NEEDLE, DIABETIC (BD ULTRA-FINE MINI PEN NEEDLE) 31 GAUGE X 3/16" NDLE    AS DIRECTED    PIOGLITAZONE (ACTOS) 15 MG TABLET    Take 1 tablet (15 mg total) by mouth once daily.    RAMIPRIL (ALTACE) 5 MG CAPSULE    TAKE 1 CAPSULE (5 MG TOTAL) BY MOUTH ONCE DAILY.    SIMVASTATIN (ZOCOR) 40 MG TABLET    Take 1 tablet (40 mg total) by mouth every evening.     Review of patient's allergies indicates:   Allergen Reactions    Clindamycin Hives, Itching and Rash       Review of Systems   Constitution: Negative for fever.   Cardiovascular: Negative for chest pain.   Respiratory: Negative for cough and shortness of breath.    Skin: Negative for rash.   Musculoskeletal: Positive for joint pain. Negative for joint swelling and stiffness.   Gastrointestinal: Negative for heartburn.   Neurological: Positive for numbness (bilateral hands). Negative for headaches.         Objective:        General    Nursing note and vitals reviewed.  Constitutional: She is oriented to person, place, and time. She appears well-developed and well-nourished.   HENT:   Head: Normocephalic and atraumatic.   Eyes: EOM are normal.   Cardiovascular: Normal rate and regular rhythm.    Pulmonary/Chest: Effort normal.   Abdominal: Soft.   Neurological: She is alert and oriented to person, place, and time.   Psychiatric: She has a normal mood and affect. Her behavior is normal.             Right Hand/Wrist Exam     Inspection   Scars: Wrist - absent Hand -  absent  Effusion: Wrist - absent Hand -  absent  Bruising: Wrist - absent Hand -  absent  Deformity: Wrist - deformity Hand -  deformity    Range of Motion     Wrist   Extension: normal   Flexion: normal   Pronation: normal   Supination: normal     Tests   Phalens sign: positive  Tinel's sign (median nerve): positive  Finkelstein's test: negative  Carpal Tunnel Compression Test: positive  Cubital Tunnel Compression Test: negative    Atrophy   Thenar:  " negative  Hypothenar:  negative  Intrinsic:  negative  1st Dorsal Interosseous: negative    Other     Neuorologic Exam    Median Distribution: normal  Ulnar Distribution: normal  Radial Distribution: normal    Comments:  2+ radial pulse        Left Hand/Wrist Exam     Inspection   Scars: Wrist - absent Hand -  absent  Effusion: Wrist - absent Hand -  absent  Bruising: Wrist - absent Hand -  absent  Deformity: Wrist - absent Hand -  absent    Range of Motion     Wrist   Extension: normal   Flexion: normal   Pronation: normal   Supination: normal     Tests   Phalens sign: positive  Tinel's sign (median nerve): positive  Finkelstein's test: negative  Carpal Tunnel Compression Test: positive  Cubital Tunnel Compression Test: negative    Atrophy  Thenar:  Negative  Hypothenar:  negative  Intrinsic: negative  1st Dorsal Interosseous:  negative    Other     Sensory Exam  Median Distribution: normal  Ulnar Distribution: normal  Radial Distribution: normal    Comments:  2+ radial pulse      Right Elbow Exam     Tests   Tinel's sign (cubital tunnel): negative      Left Elbow Exam     Tests   Tinel's sign (cubital tunnel): negative        Muscle Strength   Right Upper Extremity   Wrist extension: 5/5/5   Wrist flexion: 5/5/5   : 5/5/5   Thumb - APB: 5/5  Pinch Mechanism: 5/5  Left Upper Extremity  Wrist extension: 5/5/5   Wrist flexion: 5/5/5   :  4/5/5   Thumb - APB: 5/5  Pinch Mechanism: 5/5    Vascular Exam       Capillary Refill  Right Hand: normal capillary refill  Left Hand: normal capillary refill            Xray:   Bilateral wrists from  images and report were reviewed today.  I agree with the radiologist's interpretation.  Similar findings noted bilaterally.  There is negative ulnar variance bilaterally.  Widening of the scapholunate joint space bilaterally and rotation of the scaphoid bones  bilaterally, greater on the left.  No grossly evident fracture or dislocation.  No evidence of AVN.  No soft tissue  calcification or foreign body.    EMG/NCS from 4/2018  1. ABNORMAL study  2. There is electrodiagnostic evidence of a MODERATE-SEVERE demyelinating and axonal median neuropathy (Carpal tunnel syndrome) across the RIGHT wrist and a MODERATE demyelinating CTS across the LEFT wrist    Labs  Hgb A1c 3/21/18 11.7% - indicates poor glycemic control      Assessment:       Encounter Diagnosis   Name Primary?    Bilateral carpal tunnel syndrome Yes          Plan:       Maikol was seen today for pain and pain.    Diagnoses and all orders for this visit:    Bilateral carpal tunnel syndrome      Ms. Pacheco is a new patient with a new problem.  She has bilateral carpal tunnel syndrome.  According to her EMG, the right is worse than the left.  Symptomatically, she states the left is worse.  At this point in time, her hemoglobin A1c is far too elevated to consider surgical release due to increased risk of infection and wound problems.  I would direct her to her primary care doctor to try to get her diabetes under better control prior to proceeding with surgery. Once her hemoglobin A1c is under better control, she would benefit from repeating the nerve conduction study to ensure nothing has changed in the last 14 months.  Have discussed this with Dr. lam in who concurs.  I will CC her primary care doctor to this as well. She verbalizes understanding and agrees.    Follow up in about 3 months (around 9/5/2019).          The patient understands, chooses and consents to this plan and accepts all   the risks which include but are not limited to the risks mentioned above.     Disclaimer: This note was prepared using a voice recognition system and is likely to have sound alike errors within the text.

## 2019-06-05 NOTE — PROGRESS NOTES
HPI     Dm since 2011  No sx's  yearly diabetic exam/  she forgot her glasses today  Lab Results       Component                Value               Date                       HGBA1C                   11.7 (H)            03/21/2018                Last edited by Matty Hyatt, OD on 6/5/2019  3:55 PM. (History)            Assessment /Plan     For exam results, see Encounter Report.    Diabetes mellitus type 2 without retinopathy    Myopia, bilateral      No Background Diabetic Retinopathy    Dispense Final Rx for glasses.  RTC 1 year  Discussed above and answered questions.

## 2019-06-05 NOTE — Clinical Note
Wants CTR.  EMG from 4/2018.  Hgb A1c a yr ago 11.7.  Want me to repeat to make sure no diabetic neuropathy?  Obviously told her blood sugars need better control.  Want her to see hand b/c of high sugars?

## 2019-06-05 NOTE — LETTER
June 5, 2019      Maisha Bartholomew MD  [address]  [place] [address]           [facility]  [address] [place] [address]  Phone: [phone]  Fax: [phone]          Patient: Maikol Pacheco   MR Number: [id number]   YOB: 1980   Date of Visit: 6/5/2019       Dear Dr. Maisha Bartholomew:    Thank you for referring Maikol Pacheco to me for evaluation. Attached you will find relevant portions of my assessment and plan of care.    If you have questions, please do not hesitate to call me. I look forward to following Maikol Pacheco along with you.    Sincerely,    GLENDY Cooperosure  CC:  No Recipients    If you would like to receive this communication electronically, please contact [email or web address] or [phone] to request more information on [employer] Link access.    For providers and/or their staff who would like to refer a patient to [facility], please contact us through our one-stop-shop provider referral line, [facility], at [phone].    If you feel you have received this communication in error or would no longer like to receive these types of communications, please e-mail [email or web address]

## 2019-06-05 NOTE — Clinical Note
Dr. Bartholomew, She would benefit from a carpal tunnel release.  Most recent A1c was more than a year ago, and was over 11%.  That would need to come down more like to 7% before surgery to decrease infxn risk.  Can you see her back to recheck A1c and work on getting her better controlled?Thank you!Alyse Sanchez

## 2019-06-06 ENCOUNTER — PATIENT MESSAGE (OUTPATIENT)
Dept: ORTHOPEDICS | Facility: CLINIC | Age: 39
End: 2019-06-06

## 2019-06-06 ENCOUNTER — TELEPHONE (OUTPATIENT)
Dept: PHYSICAL MEDICINE AND REHAB | Facility: CLINIC | Age: 39
End: 2019-06-06

## 2019-06-06 ENCOUNTER — PATIENT OUTREACH (OUTPATIENT)
Dept: ADMINISTRATIVE | Facility: HOSPITAL | Age: 39
End: 2019-06-06

## 2019-06-06 DIAGNOSIS — M25.539 WRIST PAIN: Primary | ICD-10-CM

## 2019-06-06 DIAGNOSIS — M25.532 BILATERAL WRIST PAIN: ICD-10-CM

## 2019-06-06 DIAGNOSIS — G56.03 BILATERAL CARPAL TUNNEL SYNDROME: Primary | ICD-10-CM

## 2019-06-06 DIAGNOSIS — M25.531 BILATERAL WRIST PAIN: ICD-10-CM

## 2019-06-06 NOTE — TELEPHONE ENCOUNTER
Ms. Junior  I spoke to Dr. Hensley.  He confirmed we need to get your blood sugars under better control before we can consider surgery.  I also spoke to Dr. Bartholomew.  Her office will be in touch with you to schedule new blood work.  Lets plan on me seeing you back in about 3-4 months to recheck you.  We will need to repeat your EMG.  I'll have staff help get you scheduled and you can see me same day.  Let me know if you have any questions.  Thank you!  Alyse Henriquez

## 2019-06-12 ENCOUNTER — PATIENT MESSAGE (OUTPATIENT)
Dept: ORTHOPEDICS | Facility: CLINIC | Age: 39
End: 2019-06-12

## 2019-06-12 ENCOUNTER — PROCEDURE VISIT (OUTPATIENT)
Dept: NEUROLOGY | Facility: CLINIC | Age: 39
End: 2019-06-12
Payer: COMMERCIAL

## 2019-06-12 DIAGNOSIS — R20.0 NUMBNESS AND TINGLING IN BOTH HANDS: ICD-10-CM

## 2019-06-12 DIAGNOSIS — M25.539 PAIN IN WRIST, UNSPECIFIED LATERALITY: ICD-10-CM

## 2019-06-12 DIAGNOSIS — R20.2 NUMBNESS AND TINGLING IN BOTH HANDS: ICD-10-CM

## 2019-06-12 DIAGNOSIS — M25.539 WRIST PAIN: ICD-10-CM

## 2019-06-12 PROCEDURE — 95911 PR NERVE CONDUCTION STUDY; 9-10 STUDIES: ICD-10-PCS | Mod: S$GLB,,, | Performed by: PSYCHIATRY & NEUROLOGY

## 2019-06-12 PROCEDURE — 95911 NRV CNDJ TEST 9-10 STUDIES: CPT | Mod: S$GLB,,, | Performed by: PSYCHIATRY & NEUROLOGY

## 2019-06-20 DIAGNOSIS — J30.1 SEASONAL ALLERGIC RHINITIS DUE TO POLLEN: ICD-10-CM

## 2019-06-20 RX ORDER — INSULIN ASPART 100 [IU]/ML
INJECTION, SOLUTION INTRAVENOUS; SUBCUTANEOUS
Qty: 15 SYRINGE | Refills: 0 | Status: SHIPPED | OUTPATIENT
Start: 2019-06-20 | End: 2019-10-14 | Stop reason: SDUPTHER

## 2019-06-20 RX ORDER — FLUTICASONE PROPIONATE 50 MCG
2 SPRAY, SUSPENSION (ML) NASAL DAILY
Qty: 16 ML | Refills: 3 | Status: SHIPPED | OUTPATIENT
Start: 2019-06-20 | End: 2019-08-19

## 2019-06-24 RX ORDER — INSULIN ASPART 100 [IU]/ML
INJECTION, SOLUTION INTRAVENOUS; SUBCUTANEOUS
Qty: 15 SYRINGE | Refills: 0 | Status: SHIPPED | OUTPATIENT
Start: 2019-06-24 | End: 2019-09-30 | Stop reason: SDUPTHER

## 2019-06-25 RX ORDER — INSULIN DETEMIR 100 [IU]/ML
INJECTION, SOLUTION SUBCUTANEOUS
Qty: 15 ML | Refills: 0 | Status: SHIPPED | OUTPATIENT
Start: 2019-06-25 | End: 2019-10-14 | Stop reason: SDUPTHER

## 2019-06-26 ENCOUNTER — PATIENT MESSAGE (OUTPATIENT)
Dept: DIABETES | Facility: CLINIC | Age: 39
End: 2019-06-26

## 2019-07-01 ENCOUNTER — PATIENT MESSAGE (OUTPATIENT)
Dept: ORTHOPEDICS | Facility: CLINIC | Age: 39
End: 2019-07-01

## 2019-07-01 NOTE — PROCEDURES
[facility]   Geo Mott  Department of Neurology  [address] [place] [address] [place]  [address]  Phone [phone]     Fax  [phone]        Patient: Junior MAGDALENO  Patient ID: [id number]  Sex: Female  YOB: 1980  Age: 38 Years 10 Months  Ref. Provider: Ayla Henriquez PA-C  Notes: Uncontrolled DM with diabetic polyneuropathy, bilateral wrist pain. NCS 4/19/18: Moderate to severe CTS on right, moderate CTS on left.      SUMMARY       Nerve conduction studies were performed in the left and right upper extremities. The right median motor study recording the abductor pollicis brevis showed normal amplitude, prolonged distal latency and normal conduction velocity. The right ulnar motor study recording the abductor digiti minimi showed a normal amplitude, normal distal latency and normal conduction velocity. No conduction block or focal slowing was present across the elbow.     The right median sensory response recording digit two was absent. The right ulnar sensory response recording digit five showed a normal amplitude, normal latency and normal conduction velocity. The right radial sensory response recording over the extensor snuff box showed a normal amplitude, normal latency and normal conduction velocity.     The left median motor study recording the abductor pollicis brevis showed normal amplitude, prolonged distal latency and normal conduction velocity. The left ulnar motor study recording the abductor digiti minimi showed a normal amplitude, normal distal latency and normal conduction velocity. No conduction block or focal slowing was present across the elbow.     The left median sensory response recording digit two was absent. The left ulnar sensory response recording digit five showed a normal amplitude, normal latency and normal conduction velocity. The left radial sensory response recording over the extensor snuff box showed a normal amplitude, normal latency  and normal conduction velocity.                       IMPRESSION      There is electrophysiologic evidence consistent with bilateral median mononeuropathies across the wrist (very severe on the right (Grade 5/6); severe on the left (Grade 4/6)). These electrophysiologic findings are consistent with a clinical diagnosis of very severe right carpal tunnel syndrome and severe left carpal tunnel syndrome.                     ---------------------------------               Santiago Barraza M.D., F.A.A.N.      Diplomate, American Board of Psychiatry and Neurology  Diplomate, American Board of Clinical Neurophysiology   Fellow, American Academy of Neurology            Sensory NCS      Nerve / Sites Rec. Site Onset Peak NP Amp PP Amp Dist Zeeshan d Lat.2     ms ms µV µV cm m/s ms   L MEDIAN - Dig II      Wrist II NR NR NR NR 14 NR NR      Ref.   3.60  17.0  48.0    R MEDIAN - Dig II      Wrist II NR NR NR NR 14 NR NR      Ref.   3.60  17.0  48.0    L ULNAR - Dig V      Wrist Dig V 2.66 3.49 11.7 9.6 14 52.7 3.49      Ref.   3.50  15.0  48.0    R ULNAR - Dig V      Wrist Dig V 2.34 3.23 8.4 10.4 14 59.7 3.23      Ref.   3.50  15.0  48.0    L RADIAL - Thumb      Forearm Thumb 1.61 2.34 33.1 43.5 10 61.9 2.34      Ref.   2.70 18.0       R RADIAL - Thumb      Forearm Thumb 1.67 2.34 21.2 22.7 10 60.0 2.34      Ref.   2.70 18.0           Motor NCS      Nerve / Sites Rec. Site Lat Amp Dist Zeeshan     ms mV cm m/s   L MEDIAN - APB      Wrist APB 5.16 8.1 8       Ref.  4.20 6.0        Elbow APB 9.17 7.5 19.5 48.6      Ref.     49.0   R MEDIAN - APB      Wrist APB 6.56 6.1 8       Ref.  4.20 6.0        Elbow APB 11.51 4.9 20 40.4      Ref.     49.0   L ULNAR - ADM      Wrist ADM 2.66 9.1 8       Ref.  3.20 5.0        B.Elbow ADM 6.35 8.3 20 54.1      Ref.     50.0      A.Elbow ADM 8.13 7.9 11 62.1   R ULNAR - ADM      Wrist ADM 2.14 9.5 8       Ref.  3.20 5.0        B.Elbow ADM 6.82 8.8 21 44.8      Ref.     50.0      A.Elbow ADM 8.65 8.3 10  54.9

## 2019-07-08 ENCOUNTER — PATIENT OUTREACH (OUTPATIENT)
Dept: ADMINISTRATIVE | Facility: HOSPITAL | Age: 39
End: 2019-07-08

## 2019-07-20 ENCOUNTER — PATIENT MESSAGE (OUTPATIENT)
Dept: DIABETES | Facility: CLINIC | Age: 39
End: 2019-07-20

## 2019-07-22 ENCOUNTER — OFFICE VISIT (OUTPATIENT)
Dept: URGENT CARE | Facility: CLINIC | Age: 39
End: 2019-07-22
Payer: COMMERCIAL

## 2019-07-22 ENCOUNTER — HOSPITAL ENCOUNTER (OUTPATIENT)
Dept: RADIOLOGY | Facility: HOSPITAL | Age: 39
Discharge: HOME | End: 2019-07-22
Attending: NURSE PRACTITIONER
Payer: COMMERCIAL

## 2019-07-22 ENCOUNTER — PATIENT OUTREACH (OUTPATIENT)
Dept: ADMINISTRATIVE | Facility: HOSPITAL | Age: 39
End: 2019-07-22

## 2019-07-22 VITALS
RESPIRATION RATE: 18 BRPM | SYSTOLIC BLOOD PRESSURE: 118 MMHG | HEART RATE: 101 BPM | TEMPERATURE: 97 F | DIASTOLIC BLOOD PRESSURE: 84 MMHG | OXYGEN SATURATION: 100 % | BODY MASS INDEX: 43.1 KG/M2 | WEIGHT: 235.69 LBS

## 2019-07-22 DIAGNOSIS — K59.00 CONSTIPATION, UNSPECIFIED CONSTIPATION TYPE: Primary | ICD-10-CM

## 2019-07-22 DIAGNOSIS — R10.32 LEFT LOWER QUADRANT PAIN: ICD-10-CM

## 2019-07-22 PROCEDURE — 99214 OFFICE O/P EST MOD 30 MIN: CPT | Mod: S$GLB,,, | Performed by: NURSE PRACTITIONER

## 2019-07-22 PROCEDURE — 99214 PR OFFICE/OUTPT VISIT, EST, LEVL IV, 30-39 MIN: ICD-10-PCS | Mod: S$GLB,,, | Performed by: NURSE PRACTITIONER

## 2019-07-22 PROCEDURE — 74018 RADEX ABDOMEN 1 VIEW: CPT | Mod: TC,PO

## 2019-07-22 PROCEDURE — 99999 PR PBB SHADOW E&M-EST. PATIENT-LVL III: CPT | Mod: PBBFAC,,, | Performed by: NURSE PRACTITIONER

## 2019-07-22 PROCEDURE — 3079F PR MOST RECENT DIASTOLIC BLOOD PRESSURE 80-89 MM HG: ICD-10-PCS | Mod: CPTII,S$GLB,, | Performed by: NURSE PRACTITIONER

## 2019-07-22 PROCEDURE — 74018 XR ABDOMEN AP 1 VIEW: ICD-10-PCS | Mod: 26,,, | Performed by: RADIOLOGY

## 2019-07-22 PROCEDURE — 3079F DIAST BP 80-89 MM HG: CPT | Mod: CPTII,S$GLB,, | Performed by: NURSE PRACTITIONER

## 2019-07-22 PROCEDURE — 74018 RADEX ABDOMEN 1 VIEW: CPT | Mod: 26,,, | Performed by: RADIOLOGY

## 2019-07-22 PROCEDURE — 3074F SYST BP LT 130 MM HG: CPT | Mod: CPTII,S$GLB,, | Performed by: NURSE PRACTITIONER

## 2019-07-22 PROCEDURE — 3008F PR BODY MASS INDEX (BMI) DOCUMENTED: ICD-10-PCS | Mod: CPTII,S$GLB,, | Performed by: NURSE PRACTITIONER

## 2019-07-22 PROCEDURE — 99999 PR PBB SHADOW E&M-EST. PATIENT-LVL III: ICD-10-PCS | Mod: PBBFAC,,, | Performed by: NURSE PRACTITIONER

## 2019-07-22 PROCEDURE — 3008F BODY MASS INDEX DOCD: CPT | Mod: CPTII,S$GLB,, | Performed by: NURSE PRACTITIONER

## 2019-07-22 PROCEDURE — 3074F PR MOST RECENT SYSTOLIC BLOOD PRESSURE < 130 MM HG: ICD-10-PCS | Mod: CPTII,S$GLB,, | Performed by: NURSE PRACTITIONER

## 2019-07-22 RX ORDER — POLYETHYLENE GLYCOL 3350 17 G/17G
17 POWDER, FOR SOLUTION ORAL DAILY
Qty: 507 G | Refills: 0 | Status: SHIPPED | OUTPATIENT
Start: 2019-07-22 | End: 2019-08-19

## 2019-07-22 NOTE — PROGRESS NOTES
Subjective:       Patient ID: Maikol Pacheco is a 38 y.o. female.    Chief Complaint: No chief complaint on file.    Abdominal Pain   This is a new problem. Episode onset: 2 weeks. The onset quality is gradual. The problem occurs constantly. The problem has been unchanged. The pain is located in the LLQ. The pain is moderate. The quality of the pain is sharp. The abdominal pain does not radiate. Associated symptoms comments: Reports stool consistence from firm to loose. Nothing aggravates the pain. The pain is relieved by nothing. She has tried nothing for the symptoms.     Review of Systems   Gastrointestinal: Positive for abdominal pain.        Stool consistency from firm to loose       Objective:      Physical Exam   Constitutional: She is oriented to person, place, and time. She appears well-developed and well-nourished. She is cooperative. No distress.   HENT:   Head: Normocephalic.   Right Ear: Hearing and external ear normal.   Left Ear: Hearing and external ear normal.   Nose: Nose normal.   Eyes: Right eye exhibits no discharge. Left eye exhibits no discharge.   Neck: Neck supple.   Cardiovascular: Regular rhythm, S1 normal, S2 normal and normal heart sounds.   Pulmonary/Chest: Effort normal. No respiratory distress.   Abdominal: Soft. Bowel sounds are normal. She exhibits no distension, no ascites and no mass. There is no tenderness. There is no rebound and no guarding.   Musculoskeletal: Normal range of motion.   Neurological: She is alert and oriented to person, place, and time.   Skin: Skin is warm, dry and intact. Capillary refill takes less than 2 seconds. No rash noted. She is not diaphoretic. No pallor.   Nursing note and vitals reviewed.      Assessment:       1. Constipation, unspecified constipation type    2. Left lower quadrant pain        Plan:         Diagnoses and all orders for this visit:    Constipation, unspecified constipation type  -     polyethylene glycol (GLYCOLAX) 17  gram/dose powder; Take 17 g by mouth once daily.    Left lower quadrant pain  -     X-Ray Abdomen AP 1 View; Future    Follow prescribed treatment plan as directed.  Advised high fiber diet  Regular exercise  Stay hydrated 6-8 8oz glasses of water daily and rest.  Report to ER if symptoms worsen.  Follow up with PCP in 2-3 days or sooner if symptoms do not improve.

## 2019-07-22 NOTE — PATIENT INSTRUCTIONS
Unknown Causes of Abdominal Pain (Female)    The exact cause of your abdominal (stomach) pain is not clear. This does not mean that this is something to worry about. Everyone likes to know the exact cause of the problem, but sometimes with abdominal pain, there is no clear-cut cause, and this could be a good thing. The good news is that your symptoms can be treated, and you will feel better.   Your condition does not seem serious now; however, sometimes the signs of a serious problem may take more time to appear. For this reason, it is important for you to watch for any new symptoms, problems, or worsening of your condition.  Over the next few days, the abdominal pain may come and go, or be continuous. Other common symptoms can include nausea and vomiting. Sometimes it can be difficult to tell if you feel nauseous, you may just feel bad and not associate that feeling with nausea. Constipation, diarrhea, and a fever may go along with the pain.  The pain may continue even if treated correctly over the following days. Depending on how things go, sometimes the cause can become clear and may require further or different treatment. Additional evaluations, medications, or tests may also be needed.  Home care  Your healthcare provider may prescribe medicine for pain, symptoms, or an infection.  Follow the healthcare provider's instructions for taking these medicines.  General care  · Rest as much as you can until your next exam. No strenuous activities.  · Try to find positions that ease discomfort. A small pillow placed on the abdomen may help relieve pain.  · Something warm on your abdomen (such as a heating pad) may help, but be careful not to burn yourself.  Diet  · Do not force yourself to eat, especially if having cramps, vomiting, or diarrhea.  · Water is important so you do not get dehydrated. Soup may also be good. Sports drinks may also help, especially if they are not too acidic. Make sure you don't drink  sugary drinks as this can make things worse. Take liquids in small amounts. Do not guzzle them.  · Caffeine sometimes makes the pain and cramping worse.  · Avoid dairy products if you have vomiting or diarrhea.  · Don't eat large amounts at a time. Wait a few minutes between bites.  · Eat a diet low in fiber (called a low-residue diet). Foods allowed include refined breads, white rice, fruit and vegetable juices without pulp, tender meats. These foods will pass more easily through the intestine.  · Avoid whole-grain foods, whole fruits and vegetables, meats, seeds and nuts, fried or fatty foods, dairy, alcohol and spicy foods until your symptoms go away.  Follow-up care  Follow up with your healthcare provider, or as advised, if your pain does not begin to improve in the next 24 hours.  Call 911  Call 911 if any of these occur:  · Trouble breathing  · Confusion  · Fainting or loss of consciousness  · Rapid heart rate  · Seizure  When to seek medical advice  Call your healthcare provider right away if any of these occur:  · Pain gets worse or moves to the right lower abdomen  · New or worsening vomiting or diarrhea  · Swelling of the abdomen  · Unable to pass stool for more than 3 days  · Fever of 100.4ºF (38ºC) or higher, or as directed by your healthcare provider.  · Blood in vomit or bowel movements (dark red or black color)  · Jaundice (yellow color of eyes and skin)  · Weakness, dizziness  · Chest, arm, back, neck or jaw pain  · Unexpected vaginal bleeding or missed period  · Can't keep down liquids or water and are getting dehydrated  Date Last Reviewed: 12/30/2015  © 6265-8480 [employer]. [address] [place] [address], [place] [address]. All rights reserved. This information is not intended as a substitute for professional medical care. Always follow your healthcare professional's instructions.        Unknown Causes of Abdominal Pain (Female)    The exact cause of your abdominal (stomach) pain is not  clear. This does not mean that this is something to worry about. Everyone likes to know the exact cause of the problem, but sometimes with abdominal pain, there is no clear-cut cause, and this could be a good thing. The good news is that your symptoms can be treated, and you will feel better.   Your condition does not seem serious now; however, sometimes the signs of a serious problem may take more time to appear. For this reason, it is important for you to watch for any new symptoms, problems, or worsening of your condition.  Over the next few days, the abdominal pain may come and go, or be continuous. Other common symptoms can include nausea and vomiting. Sometimes it can be difficult to tell if you feel nauseous, you may just feel bad and not associate that feeling with nausea. Constipation, diarrhea, and a fever may go along with the pain.  The pain may continue even if treated correctly over the following days. Depending on how things go, sometimes the cause can become clear and may require further or different treatment. Additional evaluations, medications, or tests may also be needed.  Home care  Your healthcare provider may prescribe medicine for pain, symptoms, or an infection.  Follow the healthcare provider's instructions for taking these medicines.  General care  · Rest as much as you can until your next exam. No strenuous activities.  · Try to find positions that ease discomfort. A small pillow placed on the abdomen may help relieve pain.  · Something warm on your abdomen (such as a heating pad) may help, but be careful not to burn yourself.  Diet  · Do not force yourself to eat, especially if having cramps, vomiting, or diarrhea.  · Water is important so you do not get dehydrated. Soup may also be good. Sports drinks may also help, especially if they are not too acidic. Make sure you don't drink sugary drinks as this can make things worse. Take liquids in small amounts. Do not guzzle them.  · Caffeine  sometimes makes the pain and cramping worse.  · Avoid dairy products if you have vomiting or diarrhea.  · Don't eat large amounts at a time. Wait a few minutes between bites.  · Eat a diet low in fiber (called a low-residue diet). Foods allowed include refined breads, white rice, fruit and vegetable juices without pulp, tender meats. These foods will pass more easily through the intestine.  · Avoid whole-grain foods, whole fruits and vegetables, meats, seeds and nuts, fried or fatty foods, dairy, alcohol and spicy foods until your symptoms go away.  Follow-up care  Follow up with your healthcare provider, or as advised, if your pain does not begin to improve in the next 24 hours.  Call 911  Call 911 if any of these occur:  · Trouble breathing  · Confusion  · Fainting or loss of consciousness  · Rapid heart rate  · Seizure  When to seek medical advice  Call your healthcare provider right away if any of these occur:  · Pain gets worse or moves to the right lower abdomen  · New or worsening vomiting or diarrhea  · Swelling of the abdomen  · Unable to pass stool for more than 3 days  · Fever of 100.4ºF (38ºC) or higher, or as directed by your healthcare provider.  · Blood in vomit or bowel movements (dark red or black color)  · Jaundice (yellow color of eyes and skin)  · Weakness, dizziness  · Chest, arm, back, neck or jaw pain  · Unexpected vaginal bleeding or missed period  · Can't keep down liquids or water and are getting dehydrated  Date Last Reviewed: 12/30/2015  © 7656-2041 [employer]. [address] [place] [address], [place] [address]. All rights reserved. This information is not intended as a substitute for professional medical care. Always follow your healthcare professional's instructions.        Constipation (Adult)  Constipation means that you have bowel movements that are less frequent than usual. Stools often become very hard and difficult to pass.  Constipation is very common. At some point in life  it affects almost everyone. Since everyone's bowel habits are different, what is constipation to one person may not be to another. Your healthcare provider may do tests to diagnose constipation. It depends on what he or she finds when evaluating you.    Symptoms of constipation include:  · Abdominal pain  · Bloating  · Vomiting  · Painful bowel movements  · Itching, swelling, bleeding, or pain around the anus  Causes  Constipation can have many causes. These include:  · Diet low in fiber  · Too much dairy  · Not drinking enough liquids  · Lack of exercise or physical activity. This is especially true for older adults.  · Changes in lifestyle or daily routine, including pregnancy, aging, work, and travel  · Frequent use or misuse of laxatives  · Ignoring the urge to have a bowel movement or delaying it until later  · Medicines, such as certain prescription pain medicines, iron supplements, antacids, certain antidepressants, and calcium supplements  · Diseases like irritable bowel syndrome, bowel obstructions, stroke, diabetes, thyroid disease, Parkinson disease, hemorrhoids, and colon cancer  Complications  Potential complications of constipation can include:  · Hemorrhoids  · Rectal bleeding from hemorrhoids or anal fissures (skin tears)  · Hernias  · Dependency on laxatives  · Chronic constipation  · Fecal impaction  · Bowel obstruction or perforation  Home care  All treatment should be done after talking with your healthcare provider. This is especially true if you have another medical problems, are taking prescription medicines, or are an older adult. Treatment most often involves lifestyle changes. You may also need medicines. Your healthcare provider will tell you which will work best for you. Follow the advice below to help avoid this problem in the future.  Lifestyle changes  These lifestyle changes can help prevent constipation:  · Diet. Eat a high-fiber diet, with fresh fruit and vegetables, and reduce  dairy intake, meats, and processed foods  · Fluids. It's important to get enough fluids each day. Drink plenty of water when you eat more fiber. If you are on diet that limits the amount of fluid you can have, talk about this with your healthcare provider.  · Regular exercise. Check with your healthcare provider first.  Medications  Take any medicines as directed. Some laxatives are safe to use only every now and then. Others can be taken on a regular basis. Talk with your doctor or pharmacist if you have questions.  Prescription pain medicines can cause constipation. If you are taking this kind of medicine, ask your healthcare provider if you should also take a stool softener.  Medicines you may take to treat constipation include:  · Fiber supplements  · Stool softeners  · Laxatives  · Enemas  · Rectal suppositories  Follow-up care  Follow up with your healthcare provider if symptoms don't get better in the next few days. You may need to have more tests or see a specialist.  Call 911  Call 911 if any of these occur:  · Trouble breathing  · Stiff, rigid abdomen that is severely painful to touch  · Confusion  · Fainting or loss of consciousness  · Rapid heart rate  · Chest pain  When to seek medical advice  Call your healthcare provider right away if any of these occur:  · Fever over 100.4°F (38°C)  · Failure to resume normal bowel movements  · Pain in your abdomen or back gets worse  · Nausea or vomiting  · Swelling in your abdomen  · Blood in the stool  · Black, tarry stool  · Involuntary weight loss  · Weakness  Date Last Reviewed: 12/30/2015  © 6830-4102 [employer]. [address] [place] [address], [place] [address]. All rights reserved. This information is not intended as a substitute for professional medical care. Always follow your healthcare professional's instructions.

## 2019-07-24 ENCOUNTER — TELEPHONE (OUTPATIENT)
Dept: URGENT CARE | Facility: CLINIC | Age: 39
End: 2019-07-24

## 2019-07-29 ENCOUNTER — TELEPHONE (OUTPATIENT)
Dept: URGENT CARE | Facility: CLINIC | Age: 39
End: 2019-07-29

## 2019-08-05 ENCOUNTER — PATIENT OUTREACH (OUTPATIENT)
Dept: ADMINISTRATIVE | Facility: HOSPITAL | Age: 39
End: 2019-08-05

## 2019-08-06 ENCOUNTER — TELEPHONE (OUTPATIENT)
Dept: ORTHOPEDICS | Facility: CLINIC | Age: 39
End: 2019-08-06

## 2019-08-06 NOTE — TELEPHONE ENCOUNTER
Called the patient to reschedule their 8/28/19 appointment from the [place] location to the [facility] location for the same date and time with Dr. Aguilar. Left a message for the patient to give the office a call back.FP

## 2019-08-08 ENCOUNTER — PATIENT MESSAGE (OUTPATIENT)
Dept: DIABETES | Facility: CLINIC | Age: 39
End: 2019-08-08

## 2019-08-19 ENCOUNTER — OFFICE VISIT (OUTPATIENT)
Dept: INTERNAL MEDICINE | Facility: CLINIC | Age: 39
End: 2019-08-19
Payer: COMMERCIAL

## 2019-08-19 VITALS
DIASTOLIC BLOOD PRESSURE: 88 MMHG | WEIGHT: 232.13 LBS | OXYGEN SATURATION: 99 % | TEMPERATURE: 97 F | BODY MASS INDEX: 42.72 KG/M2 | HEIGHT: 62 IN | SYSTOLIC BLOOD PRESSURE: 120 MMHG | HEART RATE: 81 BPM

## 2019-08-19 DIAGNOSIS — E11.59 HYPERTENSION ASSOCIATED WITH DIABETES: Chronic | ICD-10-CM

## 2019-08-19 DIAGNOSIS — Z12.39 BREAST SCREENING: ICD-10-CM

## 2019-08-19 DIAGNOSIS — I15.2 HYPERTENSION ASSOCIATED WITH DIABETES: Chronic | ICD-10-CM

## 2019-08-19 DIAGNOSIS — E11.69 HYPERLIPIDEMIA ASSOCIATED WITH TYPE 2 DIABETES MELLITUS: Chronic | ICD-10-CM

## 2019-08-19 DIAGNOSIS — D50.9 IRON DEFICIENCY ANEMIA, UNSPECIFIED IRON DEFICIENCY ANEMIA TYPE: ICD-10-CM

## 2019-08-19 DIAGNOSIS — K59.04 CHRONIC IDIOPATHIC CONSTIPATION: ICD-10-CM

## 2019-08-19 DIAGNOSIS — E78.5 HYPERLIPIDEMIA ASSOCIATED WITH TYPE 2 DIABETES MELLITUS: Chronic | ICD-10-CM

## 2019-08-19 DIAGNOSIS — E66.01 MORBID OBESITY WITH BMI OF 40.0-44.9, ADULT: ICD-10-CM

## 2019-08-19 DIAGNOSIS — Z01.89 NEED FOR ASSESSMENT FOR SLEEP APNEA: ICD-10-CM

## 2019-08-19 DIAGNOSIS — F41.8 ANXIETY ASSOCIATED WITH DEPRESSION: ICD-10-CM

## 2019-08-19 PROCEDURE — 3079F PR MOST RECENT DIASTOLIC BLOOD PRESSURE 80-89 MM HG: ICD-10-PCS | Mod: CPTII,S$GLB,, | Performed by: FAMILY MEDICINE

## 2019-08-19 PROCEDURE — 3074F SYST BP LT 130 MM HG: CPT | Mod: CPTII,S$GLB,, | Performed by: FAMILY MEDICINE

## 2019-08-19 PROCEDURE — 99214 OFFICE O/P EST MOD 30 MIN: CPT | Mod: S$GLB,,, | Performed by: FAMILY MEDICINE

## 2019-08-19 PROCEDURE — 3008F BODY MASS INDEX DOCD: CPT | Mod: CPTII,S$GLB,, | Performed by: FAMILY MEDICINE

## 2019-08-19 PROCEDURE — 99999 PR PBB SHADOW E&M-EST. PATIENT-LVL IV: ICD-10-PCS | Mod: PBBFAC,,, | Performed by: FAMILY MEDICINE

## 2019-08-19 PROCEDURE — 99214 PR OFFICE/OUTPT VISIT, EST, LEVL IV, 30-39 MIN: ICD-10-PCS | Mod: S$GLB,,, | Performed by: FAMILY MEDICINE

## 2019-08-19 PROCEDURE — 3079F DIAST BP 80-89 MM HG: CPT | Mod: CPTII,S$GLB,, | Performed by: FAMILY MEDICINE

## 2019-08-19 PROCEDURE — 3008F PR BODY MASS INDEX (BMI) DOCUMENTED: ICD-10-PCS | Mod: CPTII,S$GLB,, | Performed by: FAMILY MEDICINE

## 2019-08-19 PROCEDURE — 3074F PR MOST RECENT SYSTOLIC BLOOD PRESSURE < 130 MM HG: ICD-10-PCS | Mod: CPTII,S$GLB,, | Performed by: FAMILY MEDICINE

## 2019-08-19 PROCEDURE — 99999 PR PBB SHADOW E&M-EST. PATIENT-LVL IV: CPT | Mod: PBBFAC,,, | Performed by: FAMILY MEDICINE

## 2019-08-19 RX ORDER — BUPROPION HYDROCHLORIDE 100 MG/1
100 TABLET ORAL 2 TIMES DAILY
Qty: 60 TABLET | Refills: 3 | Status: SHIPPED | OUTPATIENT
Start: 2019-08-19 | End: 2019-10-14 | Stop reason: DRUGHIGH

## 2019-08-19 NOTE — PROGRESS NOTES
Subjective:       Patient ID: Maikol Pacheco is a 39 y.o. female.    Chief Complaint: Trouble sleeping    39-year-old  female patient with Patient Active Problem List:     Hyperlipidemia associated with type 2 diabetes mellitus     Hypertension associated with diabetes     Anemia     Uncontrolled type 2 diabetes mellitus with diabetic polyneuropathy, with long-term current use of insulin     Morbid obesity with BMI of 40.0-44.9, adult     Chronic idiopathic constipation  Here for follow-up on chronic medical conditions and reports that she has been taking her insulin regularly but sugars have been running in the range of 300s to 400s.  Has been anxious and depressed and reports that she has been eating more than usual.   Patient would like to consider weight loss surgery if an option  Patient was having left lower abdominal pain and had x-rays done month ago on her abdomen at urgent care showing constipation  Reports that she has been trying to drink adequate fluids and taking stool softeners but continues to have constipation but denies any blood in the stool  Patient requesting FMLA paperwork secondary to fluctuating blood glucose levels missing work  Denies any hospitalizations secondary to uncontrolled blood glucose levels  Has appointment with diabetes specialist in 2 weeks  Patient would like to get checked for sleep apnea as she has been having snoring issues    Review of Systems   Constitutional: Negative for fatigue.   Eyes: Negative for visual disturbance.   Respiratory: Negative for shortness of breath.    Cardiovascular: Negative for chest pain and leg swelling.   Gastrointestinal: Negative for abdominal pain, nausea and vomiting.   Endocrine: Negative for polydipsia, polyphagia and polyuria.   Musculoskeletal: Negative for myalgias.   Skin: Negative for rash.   Neurological: Positive for numbness. Negative for weakness, light-headedness and headaches.   Psychiatric/Behavioral:  "Positive for decreased concentration and sleep disturbance. The patient is nervous/anxious.          /88 (BP Location: Right arm, Patient Position: Sitting)   Pulse 81   Temp 96.5 °F (35.8 °C) (Tympanic)   Ht 5' 2" (1.575 m)   Wt 105.3 kg (232 lb 2.3 oz)   LMP 08/08/2019 (Approximate)   SpO2 99%   BMI 42.46 kg/m²   Objective:      Physical Exam   Constitutional: She is oriented to person, place, and time. She appears well-developed and well-nourished.   HENT:   Head: Normocephalic and atraumatic.   Mouth/Throat: Oropharynx is clear and moist.   Cardiovascular: Normal rate, regular rhythm and normal heart sounds.   No murmur heard.  Pulmonary/Chest: Effort normal and breath sounds normal. She has no wheezes.   Abdominal: Soft. Bowel sounds are normal. There is no tenderness.   Musculoskeletal: She exhibits no edema or tenderness.   Neurological: She is alert and oriented to person, place, and time.   Skin: Skin is warm and dry. No rash noted.   Psychiatric: She has a normal mood and affect.         Assessment/Plan:   1. Uncontrolled type 2 diabetes mellitus with diabetic polyneuropathy, with long-term current use of insulin  - CBC auto differential; Future  - Comprehensive metabolic panel; Future  - Lipid panel; Future  - Hemoglobin A1c; Future  - Microalbumin/creatinine urine ratio; Future  Patient was advised to increase Levemir from 25-30 units twice daily if tolerable and continue taking NovoLog as per sliding scale  Continue Trulicity , Actos 15 mg daily    FMLA paperwork filled out and given to patient today secondary to fluctuating blood glucose levels  Patient to follow up with diabetes clinic closely and encouraged to bring blood glucose log  Medication compliance discussed with patient today    2. Hypertension associated with diabetes  - Comprehensive metabolic panel; Future  - Lipid panel; Future  - TSH; Future  - Urinalysis; Future  Blood pressure is stable today currently on ramipril 5 mg " daily      3. Hyperlipidemia associated with type 2 diabetes mellitus  - Lipid panel; Future  Currently on simvastatin 40 mg daily    4. Morbid obesity with BMI of 40.0-44.9, adult  Lifestyle modifications recommended to lose weight with BMI 42    5. Iron deficiency anemia, unspecified iron deficiency anemia type  - CBC auto differential; Future  Taking iron supplements once daily    6. Breast screening  - Mammo Digital Screening Bilateral With CAD; Future  Due for mammogram    7. Chronic idiopathic constipation  Advised to take over-the-counter Metamucil and MiraLax as needed for constipation drink adequate fluids up to 64 oz of water daily    8. Anxiety associated with depression  - buPROPion (WELLBUTRIN) 100 MG tablet; Take 1 tablet (100 mg total) by mouth 2 (two) times daily.  Dispense: 60 tablet; Refill: 3  Will do a trial of  Wellbutrin 100 mg twice daily  Follow-up in 6 weeks    9. Need for assessment for sleep apnea  - Ambulatory consult to Pulmonology  Will evaluate for sleep apnea

## 2019-08-28 ENCOUNTER — OFFICE VISIT (OUTPATIENT)
Dept: ORTHOPEDICS | Facility: CLINIC | Age: 39
End: 2019-08-28
Payer: COMMERCIAL

## 2019-08-28 VITALS
WEIGHT: 232 LBS | SYSTOLIC BLOOD PRESSURE: 128 MMHG | DIASTOLIC BLOOD PRESSURE: 90 MMHG | BODY MASS INDEX: 42.69 KG/M2 | HEART RATE: 89 BPM | HEIGHT: 62 IN

## 2019-08-28 DIAGNOSIS — G56.02 CARPAL TUNNEL SYNDROME ON LEFT: ICD-10-CM

## 2019-08-28 DIAGNOSIS — G56.03 CARPAL TUNNEL SYNDROME, BILATERAL UPPER LIMBS: Primary | ICD-10-CM

## 2019-08-28 PROCEDURE — 3074F SYST BP LT 130 MM HG: CPT | Mod: CPTII,S$GLB,, | Performed by: ORTHOPAEDIC SURGERY

## 2019-08-28 PROCEDURE — 3008F PR BODY MASS INDEX (BMI) DOCUMENTED: ICD-10-PCS | Mod: CPTII,S$GLB,, | Performed by: ORTHOPAEDIC SURGERY

## 2019-08-28 PROCEDURE — 3080F DIAST BP >= 90 MM HG: CPT | Mod: CPTII,S$GLB,, | Performed by: ORTHOPAEDIC SURGERY

## 2019-08-28 PROCEDURE — 99999 PR PBB SHADOW E&M-EST. PATIENT-LVL III: ICD-10-PCS | Mod: PBBFAC,,, | Performed by: ORTHOPAEDIC SURGERY

## 2019-08-28 PROCEDURE — 99214 OFFICE O/P EST MOD 30 MIN: CPT | Mod: S$GLB,,, | Performed by: ORTHOPAEDIC SURGERY

## 2019-08-28 PROCEDURE — 99999 PR PBB SHADOW E&M-EST. PATIENT-LVL III: CPT | Mod: PBBFAC,,, | Performed by: ORTHOPAEDIC SURGERY

## 2019-08-28 PROCEDURE — 99214 PR OFFICE/OUTPT VISIT, EST, LEVL IV, 30-39 MIN: ICD-10-PCS | Mod: S$GLB,,, | Performed by: ORTHOPAEDIC SURGERY

## 2019-08-28 PROCEDURE — 3080F PR MOST RECENT DIASTOLIC BLOOD PRESSURE >= 90 MM HG: ICD-10-PCS | Mod: CPTII,S$GLB,, | Performed by: ORTHOPAEDIC SURGERY

## 2019-08-28 PROCEDURE — 3074F PR MOST RECENT SYSTOLIC BLOOD PRESSURE < 130 MM HG: ICD-10-PCS | Mod: CPTII,S$GLB,, | Performed by: ORTHOPAEDIC SURGERY

## 2019-08-28 PROCEDURE — 3008F BODY MASS INDEX DOCD: CPT | Mod: CPTII,S$GLB,, | Performed by: ORTHOPAEDIC SURGERY

## 2019-08-28 RX ORDER — ETODOLAC 300 MG/1
400 CAPSULE ORAL 3 TIMES DAILY
COMMUNITY
End: 2019-09-30

## 2019-08-28 RX ORDER — CYCLOBENZAPRINE HCL 10 MG
10 TABLET ORAL 3 TIMES DAILY PRN
COMMUNITY
End: 2020-10-23

## 2019-08-28 NOTE — PROGRESS NOTES
Subjective:     Patient ID: Maikol Pacheco is a 39 y.o. female.    Chief Complaint: Pain and Numbness of the Left Hand and Pain and Numbness of the Right Hand    The patient is a 39-year-old female with bilateral carpal tunnel syndrome documented by nerve conduction studies.  She has tried splints without improvement.  The numbness is constant rather than intermittent.  She wishes to have a left carpal tunnel release.      Past Medical History:   Diagnosis Date    Anemia     Diabetes mellitus type II 2008    BS- 400's last week    Gastroparesis     Hyperlipidemia     Hypertension     Morbid obesity      Past Surgical History:   Procedure Laterality Date    TONSILLECTOMY       Family History   Problem Relation Age of Onset    Hyperlipidemia Mother     Diabetes Maternal Grandfather     Cancer Maternal Aunt         breast    Diabetes Maternal Grandmother     Stroke Maternal Grandmother     Hypertension Other      Social History     Socioeconomic History    Marital status: Single     Spouse name: Not on file    Number of children: 0    Years of education: Not on file    Highest education level: Not on file   Occupational History     Employer: [employer]   Social Needs    Financial resource strain: Not on file    Food insecurity:     Worry: Not on file     Inability: Not on file    Transportation needs:     Medical: Not on file     Non-medical: Not on file   Tobacco Use    Smoking status: Never Smoker    Smokeless tobacco: Never Used   Substance and Sexual Activity    Alcohol use: Yes     Comment: socially    Drug use: No    Sexual activity: Yes     Partners: Male     Birth control/protection: Condom   Lifestyle    Physical activity:     Days per week: Not on file     Minutes per session: Not on file    Stress: Not on file   Relationships    Social connections:     Talks on phone: Not on file     Gets together: Not on file     Attends [employer] service: Not on file     Active member of  club or organization: Not on file     Attends meetings of clubs or organizations: Not on file     Relationship status: Not on file   Other Topics Concern    Not on file   Social History Narrative    Single. Lives with mom. Has no children. 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      Medication List with Changes/Refills   Current Medications    BLOOD SUGAR DIAGNOSTIC STRP    1 strip by Misc.(Non-Drug; Combo Route) route 2 (two) times daily. Test strips covered by insurance    BLOOD-GLUCOSE METER KIT    Blood glucose Meter kit covered by insurance. Use as instructed    BUPROPION (WELLBUTRIN) 100 MG TABLET    Take 1 tablet (100 mg total) by mouth 2 (two) times daily.    CYCLOBENZAPRINE (FLEXERIL) 10 MG TABLET    Take 10 mg by mouth 3 (three) times daily as needed for Muscle spasms.    DULAGLUTIDE (TRULICITY) 1.5 MG/0.5 ML PNIJ    Inject 1.5 mg into the skin every 7 days.    ETODOLAC (LODINE) 300 MG CAP    Take 400 mg by mouth 3 (three) times daily.    FERROUS SULFATE 325 MG (65 MG IRON) TAB TABLET    Take 325 mg by mouth once daily.     FLASH GLUCOSE SCANNING READER (FREESTYLE VASILE 14 DAY READER) MISC    1 each by Misc.(Non-Drug; Combo Route) route once daily.    FLASH GLUCOSE SENSOR (FREESTYLE VASILE 14 DAY SENSOR) KIT    1 each by Misc.(Non-Drug; Combo Route) route every 14 (fourteen) days.    INSULIN ASPART U-100 (NOVOLOG FLEXPEN U-100 INSULIN) 100 UNIT/ML (3 ML) INPN PEN    TITRATE UP TO 24 UNITS SUBCUE 10-15 MIN BEFORE MEALS 3 TIMES A DAY AS DIRECTED    INSULIN ASPART U-100 (NOVOLOG FLEXPEN U-100 INSULIN) 100 UNIT/ML (3 ML) INPN PEN    TITRATE UP TO 24 UNITS SUBCUE 10-15 MIN BEFORE MEALS 3 TIMES A DAY AS DIRECTED    LANCETS 33 GAUGE MISC    1 lancet by Misc.(Non-Drug; Combo Route) route 2 (two) times daily. Covered by insurance    LEVEMIR FLEXTOUCH U-100 INSULN 100 UNIT/ML (3 ML) INPN PEN    INJECT 20 UNITS INTO THE SKIN 2 (TWO) TIMES DAILY.    PEN NEEDLE, DIABETIC (BD ULTRA-FINE MINI PEN NEEDLE) 31  "GAUGE X 3/16" NDLE    AS DIRECTED    PIOGLITAZONE (ACTOS) 15 MG TABLET    Take 1 tablet (15 mg total) by mouth once daily.    RAMIPRIL (ALTACE) 5 MG CAPSULE    TAKE 1 CAPSULE (5 MG TOTAL) BY MOUTH ONCE DAILY.    SIMVASTATIN (ZOCOR) 40 MG TABLET    Take 1 tablet (40 mg total) by mouth every evening.     Review of patient's allergies indicates:   Allergen Reactions    Clindamycin Hives, Itching and Rash     Review of Systems   Constitution: Negative for chills and decreased appetite.   HENT: Negative for hearing loss.    Eyes: Negative for double vision and visual disturbance.   Cardiovascular: Negative for chest pain.   Endocrine: Negative for cold intolerance.   Hematologic/Lymphatic: Does not bruise/bleed easily.   Skin: Negative for poor wound healing and suspicious lesions.   Musculoskeletal: Negative for gout, joint pain and joint swelling.   Gastrointestinal: Positive for constipation. Negative for nausea and vomiting.   Genitourinary: Negative for dysuria.   Neurological: Positive for numbness, paresthesias and sensory change. Negative for seizures.   Psychiatric/Behavioral: Negative for depression, memory loss, substance abuse, suicidal ideas and thoughts of violence.   Allergic/Immunologic: Negative for HIV exposure and persistent infections.       Objective:   Body mass index is 42.43 kg/m².  Vitals:    08/28/19 1259   BP: (!) 128/90   Pulse: 89                General    Constitutional: She is oriented to person, place, and time. She appears well-developed and well-nourished. No distress.   HENT:   Head: Normocephalic.   Mouth/Throat: Oropharynx is clear and moist.   Eyes: EOM are normal. Pupils are equal, round, and reactive to light.   Neck: Normal range of motion.   Cardiovascular: Normal rate, regular rhythm and normal heart sounds.  Exam reveals no gallop.    Pulmonary/Chest: Breath sounds normal.   Abdominal: Soft. She exhibits no mass. There is no tenderness.   Neurological: She is alert and " oriented to person, place, and time. No cranial nerve deficit.   Psychiatric: She has a normal mood and affect.             Right Hand/Wrist Exam     Inspection   Scars: Wrist - absent Hand -  absent  Effusion: Wrist - absent Hand -  absent    Pain   Wrist - The patient exhibits pain of the flexor/pronator group.    Tenderness   The patient is tender to palpation of the molina area.    Tests   Phalens sign: positive  Tinel's sign (median nerve): positive  Carpal Tunnel Compression Test: positive      Other     Neuorologic Exam    Median Distribution: abnormal  Ulnar Distribution: normal  Radial Distribution: normal    Comments:  The patient has a positive Tinel and positive Phalen sign.  Two-point discrimination is 3-5 mm in all fingertips.  There is no intrinsic atrophy noted.      Left Hand/Wrist Exam     Inspection   Scars: Wrist - absent Hand -  absent  Effusion: Wrist - absent Hand -  absent    Pain   Wrist - The patient exhibits pain of the flexor/pronator group.    Tenderness   The patient is tender to palpation of the molina area.     Tests   Phalens sign: positive  Tinel's sign (median nerve): positive  Carpal Tunnel Compression Test: positive      Other     Sensory Exam  Median Distribution: abnormal  Ulnar Distribution: normal  Radial Distribution: normal    Comments:  The patient has a positive Tinel and positive Phalen sign.  Two-point discrimination is 3-5 mm in all fingertips.  There is no intrinsic atrophy noted.          Vascular Exam       Capillary Refill  Right Hand: normal capillary refill  Left Hand: normal capillary refill      Relevant imaging results reviewed and interpreted by me, discussed with the patient and / or family today radiographs of both wrists were normal but with symmetrical widening of the scapholunate interval bilaterally.  Assessment:     Encounter Diagnosis   Name Primary?    Carpal tunnel syndrome, bilateral upper limbs Yes        Plan:         The patient wishes to  have left carpal tunnel release.  She was counseled regarding the surgery. Risk complications and alternatives were discussed including the risk of infection, anesthetic risk, injury to nerves and vessels, loss of motion, and possible need for additional surgeries were discussed.  She seems to understand and agree that surgery. All questions were answered.            Disclaimer: This note was prepared using a voice recognition system and is likely to have sound alike errors within the text.

## 2019-08-28 NOTE — LETTER
August 28, 2019      Ayla Henriquez PA-C  [address]  [place] [address]           [facility]  [address] [place] [id number]  Phone: [phone]  Fax: [phone]          Patient: Maikol Pacheco   MR Number: [id number]   YOB: 1980   Date of Visit: 8/28/2019       Dear Ayla Henriquez:    Thank you for referring Maikol Pacheco to me for evaluation. Attached you will find relevant portions of my assessment and plan of care.    If you have questions, please do not hesitate to call me. I look forward to following Maikol Pacheco along with you.    Sincerely,    Francisco Aguilar MD    Enclosure  CC:  No Recipients    If you would like to receive this communication electronically, please contact [email or web address] or [phone] to request more information on [facility] access.    For providers and/or their staff who would like to refer a patient to [facility], please contact us through our one-stop-shop provider referral line, [facility], at [phone].    If you feel you have received this communication in error or would no longer like to receive these types of communications, please e-mail [email or web address]

## 2019-09-06 ENCOUNTER — PATIENT OUTREACH (OUTPATIENT)
Dept: ADMINISTRATIVE | Facility: HOSPITAL | Age: 39
End: 2019-09-06

## 2019-09-09 ENCOUNTER — PATIENT OUTREACH (OUTPATIENT)
Dept: ADMINISTRATIVE | Facility: HOSPITAL | Age: 39
End: 2019-09-09

## 2019-09-12 ENCOUNTER — TELEPHONE (OUTPATIENT)
Dept: DIABETES | Facility: CLINIC | Age: 39
End: 2019-09-12

## 2019-09-16 ENCOUNTER — PATIENT OUTREACH (OUTPATIENT)
Dept: ADMINISTRATIVE | Facility: HOSPITAL | Age: 39
End: 2019-09-16

## 2019-09-17 DIAGNOSIS — I10 HTN (HYPERTENSION): ICD-10-CM

## 2019-09-17 RX ORDER — RAMIPRIL 5 MG/1
5 CAPSULE ORAL DAILY
Qty: 90 CAPSULE | Refills: 1 | Status: SHIPPED | OUTPATIENT
Start: 2019-09-17 | End: 2020-02-05 | Stop reason: SDUPTHER

## 2019-09-25 ENCOUNTER — PATIENT MESSAGE (OUTPATIENT)
Dept: INTERNAL MEDICINE | Facility: CLINIC | Age: 39
End: 2019-09-25

## 2019-09-30 ENCOUNTER — HOSPITAL ENCOUNTER (OUTPATIENT)
Dept: PREADMISSION TESTING | Facility: HOSPITAL | Age: 39
Discharge: HOME | End: 2019-09-30
Attending: ORTHOPAEDIC SURGERY
Payer: COMMERCIAL

## 2019-09-30 VITALS
WEIGHT: 242.5 LBS | BODY MASS INDEX: 44.63 KG/M2 | HEIGHT: 62 IN | RESPIRATION RATE: 16 BRPM | HEART RATE: 78 BPM | TEMPERATURE: 99 F | DIASTOLIC BLOOD PRESSURE: 87 MMHG | OXYGEN SATURATION: 99 % | SYSTOLIC BLOOD PRESSURE: 147 MMHG

## 2019-09-30 DIAGNOSIS — Z01.818 PREOP TESTING: Primary | ICD-10-CM

## 2019-09-30 DIAGNOSIS — I15.2 HYPERTENSION ASSOCIATED WITH DIABETES: Chronic | ICD-10-CM

## 2019-09-30 DIAGNOSIS — E11.59 HYPERTENSION ASSOCIATED WITH DIABETES: Chronic | ICD-10-CM

## 2019-09-30 DIAGNOSIS — Z01.818 PREOPERATIVE TESTING: Primary | ICD-10-CM

## 2019-09-30 DIAGNOSIS — E11.69 HYPERLIPIDEMIA ASSOCIATED WITH TYPE 2 DIABETES MELLITUS: Chronic | ICD-10-CM

## 2019-09-30 DIAGNOSIS — G56.00 CARPAL TUNNEL SYNDROME, UNSPECIFIED LATERALITY: ICD-10-CM

## 2019-09-30 DIAGNOSIS — E78.5 HYPERLIPIDEMIA ASSOCIATED WITH TYPE 2 DIABETES MELLITUS: Chronic | ICD-10-CM

## 2019-09-30 DIAGNOSIS — E66.01 MORBID OBESITY WITH BMI OF 40.0-44.9, ADULT: ICD-10-CM

## 2019-09-30 LAB
ALBUMIN SERPL BCP-MCNC: 2.9 G/DL (ref 3.5–5.2)
ALP SERPL-CCNC: 92 U/L (ref 55–135)
ALT SERPL W/O P-5'-P-CCNC: 5 U/L (ref 10–44)
ANION GAP SERPL CALC-SCNC: 10 MMOL/L (ref 8–16)
AST SERPL-CCNC: 10 U/L (ref 10–40)
BASOPHILS # BLD AUTO: 0.02 K/UL (ref 0–0.2)
BASOPHILS NFR BLD: 0.3 % (ref 0–1.9)
BILIRUB SERPL-MCNC: 0.1 MG/DL (ref 0.1–1)
BUN SERPL-MCNC: 13 MG/DL (ref 6–20)
CALCIUM SERPL-MCNC: 8.7 MG/DL (ref 8.7–10.5)
CHLORIDE SERPL-SCNC: 105 MMOL/L (ref 95–110)
CO2 SERPL-SCNC: 24 MMOL/L (ref 23–29)
CREAT SERPL-MCNC: 0.7 MG/DL (ref 0.5–1.4)
DIFFERENTIAL METHOD: ABNORMAL
EOSINOPHIL # BLD AUTO: 0.2 K/UL (ref 0–0.5)
EOSINOPHIL NFR BLD: 2.2 % (ref 0–8)
ERYTHROCYTE [DISTWIDTH] IN BLOOD BY AUTOMATED COUNT: 14.4 % (ref 11.5–14.5)
EST. GFR  (AFRICAN AMERICAN): >60 ML/MIN/1.73 M^2
EST. GFR  (NON AFRICAN AMERICAN): >60 ML/MIN/1.73 M^2
ESTIMATED AVG GLUCOSE: 217 MG/DL (ref 68–131)
GLUCOSE SERPL-MCNC: 90 MG/DL (ref 70–110)
HBA1C MFR BLD HPLC: 9.2 % (ref 4–5.6)
HCT VFR BLD AUTO: 33.5 % (ref 37–48.5)
HGB BLD-MCNC: 10.6 G/DL (ref 12–16)
IMM GRANULOCYTES # BLD AUTO: 0.02 K/UL (ref 0–0.04)
IMM GRANULOCYTES NFR BLD AUTO: 0.3 % (ref 0–0.5)
LYMPHOCYTES # BLD AUTO: 2.7 K/UL (ref 1–4.8)
LYMPHOCYTES NFR BLD: 35.4 % (ref 18–48)
MCH RBC QN AUTO: 26.4 PG (ref 27–31)
MCHC RBC AUTO-ENTMCNC: 31.6 G/DL (ref 32–36)
MCV RBC AUTO: 84 FL (ref 82–98)
MONOCYTES # BLD AUTO: 0.7 K/UL (ref 0.3–1)
MONOCYTES NFR BLD: 8.5 % (ref 4–15)
NEUTROPHILS # BLD AUTO: 4.1 K/UL (ref 1.8–7.7)
NEUTROPHILS NFR BLD: 53.6 % (ref 38–73)
NRBC BLD-RTO: 0 /100 WBC
PLATELET # BLD AUTO: 405 K/UL (ref 150–350)
PMV BLD AUTO: 10.8 FL (ref 9.2–12.9)
POTASSIUM SERPL-SCNC: 3.6 MMOL/L (ref 3.5–5.1)
PROT SERPL-MCNC: 7.9 G/DL (ref 6–8.4)
RBC # BLD AUTO: 4.01 M/UL (ref 4–5.4)
SODIUM SERPL-SCNC: 139 MMOL/L (ref 136–145)
WBC # BLD AUTO: 7.63 K/UL (ref 3.9–12.7)

## 2019-09-30 PROCEDURE — 93010 EKG 12-LEAD: ICD-10-PCS | Mod: ,,, | Performed by: INTERNAL MEDICINE

## 2019-09-30 PROCEDURE — 93010 ELECTROCARDIOGRAM REPORT: CPT | Mod: ,,, | Performed by: INTERNAL MEDICINE

## 2019-09-30 PROCEDURE — 93005 ELECTROCARDIOGRAM TRACING: CPT

## 2019-09-30 PROCEDURE — 80053 COMPREHEN METABOLIC PANEL: CPT

## 2019-09-30 PROCEDURE — 85025 COMPLETE CBC W/AUTO DIFF WBC: CPT

## 2019-09-30 PROCEDURE — 83036 HEMOGLOBIN GLYCOSYLATED A1C: CPT

## 2019-09-30 RX ORDER — FAMOTIDINE 20 MG/1
20 TABLET, FILM COATED ORAL
Status: CANCELLED | OUTPATIENT
Start: 2019-09-30 | End: 2019-09-30

## 2019-09-30 RX ORDER — ALPRAZOLAM 0.25 MG/1
0.25 TABLET ORAL ONCE AS NEEDED
Status: CANCELLED | OUTPATIENT
Start: 2019-09-30 | End: 2031-02-26

## 2019-09-30 RX ORDER — SODIUM CHLORIDE, SODIUM LACTATE, POTASSIUM CHLORIDE, CALCIUM CHLORIDE 600; 310; 30; 20 MG/100ML; MG/100ML; MG/100ML; MG/100ML
INJECTION, SOLUTION INTRAVENOUS
Status: CANCELLED | OUTPATIENT
Start: 2019-09-30 | End: 2019-09-30

## 2019-09-30 RX ORDER — PHENTERMINE HYDROCHLORIDE 37.5 MG/1
37.5 CAPSULE ORAL EVERY MORNING
COMMUNITY
End: 2020-10-23

## 2019-09-30 RX ORDER — ACETAMINOPHEN 500 MG
1000 TABLET ORAL
Status: CANCELLED | OUTPATIENT
Start: 2019-09-30 | End: 2019-09-30

## 2019-09-30 NOTE — H&P (VIEW-ONLY)
Preoperative History and Physical                                                             Hospital Medicine      Chief Complaint: Preoperative evaluation     History of Present Illness:      Maikol Pacheco is a 39 y.o. female with DM, HTN,. Morbid obesity, HLD, Carpal tunnel syndrome who presents to the office today for a preoperative consultation at the request of Dr. Aguilar who plans on performing left carpal tunnel release on October 17.     Functional Status:      The patient is able to climb a flight of stairs. The patient is able to ambulate only one block without difficulty. The patient's functional status is not affected by the surgical problem. The patient's functional status is not affected by shortness of breath, chest pain, dyspnea on exertion and fatigue.  Pt reports back pain limits walking distance.     MET score less than 4    Past Medical History:      Past Medical History:   Diagnosis Date    Anemia     Diabetes mellitus type II 2008    BS- 400's last week    Gastroparesis     Hyperlipidemia     Hypertension     Morbid obesity         Past Surgical History:      Past Surgical History:   Procedure Laterality Date    reshaping balloon endoscopy procedure       TONSILLECTOMY          Social History:      Social History     Socioeconomic History    Marital status: Single     Spouse name: Not on file    Number of children: 0    Years of education: Not on file    Highest education level: Not on file   Occupational History     Employer: [employer]   Social Needs    Financial resource strain: Not on file    Food insecurity:     Worry: Not on file     Inability: Not on file    Transportation needs:     Medical: Not on file     Non-medical: Not on file   Tobacco Use    Smoking status: Former Smoker     Years: 20.00    Smokeless tobacco: Never Used   Substance and Sexual Activity    Alcohol use: Yes     Frequency: Monthly or less      Comment: socially    Drug use: No    Sexual activity: Yes     Partners: Male     Birth control/protection: Condom   Lifestyle    Physical activity:     Days per week: Not on file     Minutes per session: Not on file    Stress: Not on file   Relationships    Social connections:     Talks on phone: Not on file     Gets together: Not on file     Attends Islam service: Not on file     Active member of club or organization: Not on file     Attends meetings of clubs or organizations: Not on file     Relationship status: Not on file   Other Topics Concern    Not on file   Social History Narrative    Single. Lives with mom. Has no children. 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         Family History:      Family History   Problem Relation Age of Onset    Hyperlipidemia Mother     Diabetes Maternal Grandfather     Cancer Maternal Aunt         breast    Diabetes Maternal Grandmother     Stroke Maternal Grandmother     Hypertension Other        Allergies:      Review of patient's allergies indicates:   Allergen Reactions    Clindamycin Hives, Itching and Rash       Medications:      Current Outpatient Medications   Medication Sig    buPROPion (WELLBUTRIN) 100 MG tablet Take 1 tablet (100 mg total) by mouth 2 (two) times daily. (Patient taking differently: Take 100 mg by mouth 3 (three) times daily. Take am of surgery)    cyclobenzaprine (FLEXERIL) 10 MG tablet Take 10 mg by mouth 3 (three) times daily as needed for Muscle spasms.    dulaglutide (TRULICITY) 1.5 mg/0.5 mL PnIj Inject 1.5 mg into the skin every 7 days.    ferrous sulfate 325 mg (65 mg iron) Tab tablet Take 325 mg by mouth once daily.     insulin aspart U-100 (NOVOLOG FLEXPEN U-100 INSULIN) 100 unit/mL (3 mL) InPn pen TITRATE UP TO 24 UNITS SUBCUE 10-15 MIN BEFORE MEALS 3 TIMES A DAY AS DIRECTED (Patient taking differently: TITRATE UP TO 24 UNITS SUBCUE 10-15 MIN BEFORE MEALS 3 TIMES A DAY AS DIRECTED    Hold am of surgery)     "LEVEMIR FLEXTOUCH U-100 INSULN 100 unit/mL (3 mL) InPn pen INJECT 20 UNITS INTO THE SKIN 2 (TWO) TIMES DAILY. (Patient taking differently: Take 10 units evening prior to surgery, hold am of surgery)    pioglitazone (ACTOS) 15 MG tablet Take 1 tablet (15 mg total) by mouth once daily. (Patient taking differently: Take 15 mg by mouth once daily. Hold am of surgery)    ramipril (ALTACE) 5 MG capsule Take 1 capsule (5 mg total) by mouth once daily. (Patient taking differently: Take 5 mg by mouth once daily. Hold morning before surgery and am of surgery)    simvastatin (ZOCOR) 40 MG tablet Take 1 tablet (40 mg total) by mouth every evening.    blood sugar diagnostic Strp 1 strip by Misc.(Non-Drug; Combo Route) route 2 (two) times daily. Test strips covered by [facility]    blood-glucose meter kit Blood glucose Meter kit covered by [facility]. Use as instructed    blood-glucose meter,continuous (DEXCOM G6 ) Misc Use per 's instructions    blood-glucose sensor (DEXCOM G6 SENSOR) Shyanne Use one per 10 days per 's instruction.    blood-glucose transmitter (DEXCOM G6 TRANSMITTER) Shyanne Use per 's instructions    flash glucose scanning reader (FREESTYLE VASILE 14 DAY READER) Misc 1 each by Misc.(Non-Drug; Combo Route) route once daily.    flash glucose sensor (FREESTYLE VASILE 14 DAY SENSOR) Kit 1 each by Misc.(Non-Drug; Combo Route) route every 14 (fourteen) days.    lancets 33 gauge Misc 1 lancet by Misc.(Non-Drug; Combo Route) route 2 (two) times daily. Covered by [facility]    pen needle, diabetic (BD ULTRA-FINE MINI PEN NEEDLE) 31 gauge x 3/16" Ndle AS DIRECTED    phentermine 37.5 MG capsule Take 37.5 mg by mouth every morning. Hold 14 days prior to surgery     No current facility-administered medications for this encounter.        Vitals:      Vitals:    09/30/19 1033   BP: (!) 147/87   Pulse: 78   Resp: 16   Temp: 98.5 °F (36.9 °C)       Review of Systems:      "   Constitutional: Negative for fever, chills, weight loss, malaise/fatigue and diaphoresis.   HENT: Negative for hearing loss, ear pain, nosebleeds, congestion, sore throat, neck pain, tinnitus and ear discharge.    Eyes: Negative for blurred vision, double vision, photophobia, pain, discharge and redness.   Respiratory: Negative for cough, hemoptysis, sputum production, shortness of breath, wheezing and stridor.    Cardiovascular: Negative for chest pain, palpitations, orthopnea, claudication, leg swelling and PND.   Gastrointestinal: Negative for heartburn, nausea, vomiting, abdominal pain, diarrhea, constipation, blood in stool and melena.   Genitourinary: Negative for dysuria, urgency, frequency, hematuria and flank pain.   Musculoskeletal: +chronic back pain +wrist pain Negative for myalgias,joint pain and falls.   Skin: Negative for itching and rash.   Neurological: Negative for dizziness, tingling, tremors, sensory change, speech change, focal weakness, seizures, loss of consciousness, weakness and headaches.   Endo/Heme/Allergies: Negative for environmental allergies and polydipsia. Does not bruise/bleed easily.   Psychiatric/Behavioral: Negative for depression, suicidal ideas, hallucinations, memory loss and substance abuse. The patient is not nervous/anxious and does not have insomnia.    All 14 systems reviewed and negative except as noted above.    Physical Exam:      Constitutional: Appears well-developed, well-nourished and in no acute distress.  Patient is oriented to person, place, and time.   Head: Normocephalic and atraumatic. Mucous membranes moist.  Neck: Neck supple no mass.   Cardiovascular: Normal rate and regular rhythm.  S1 S2 appreciated by ascultation.  Pulmonary/Chest: Effort normal and clear to auscultation bilaterally. No respiratory distress.   Abdomen: Soft. Non-tender and non-distended. Bowel sounds are normal.   Neurological: Patient is alert and oriented to person, place and time.  Moves all extremities.  Skin: Warm and dry. No lesions.  Extremities: No clubbing, cyanosis or edema.    Laboratory data:      Reviewed and noted in plan where applicable. Please see chart for full laboratory data.    No results for input(s): CPK, CPKMB, TROPONINI, MB in the last 24 hours. No results for input(s): POCTGLUCOSE in the last 24 hours.     No results found for: INR, PROTIME    Lab Results   Component Value Date    WBC 7.63 09/30/2019    HGB 10.6 (L) 09/30/2019    HCT 33.5 (L) 09/30/2019    MCV 84 09/30/2019     (H) 09/30/2019       Recent Labs   Lab 09/30/19  1030   GLU 90      K 3.6      CO2 24   BUN 13   CREATININE 0.7   CALCIUM 8.7       Predictors of intubation difficulty:       Morbid obesity? yes - BMI 44   Anatomically abnormal facies? no   Prominent incisors? no   Receding mandible? no   Short, thick neck? yes -    Neck range of motion: normal   Dentition: Missing teeth (several)    Cardiographics:      ECG: normal sinus rhythm, no blocks or conduction defects, no ischemic changes  9/30/19    Imaging:      Chest x-ray:none     Assessment and Plan:      Carpal tunnel syndrome  The patient has carpal tunnel syndrome and will have a left carpal tunnel release on 10/17/19 per Dr. Aguilar     Known risk factors for perioperative complications:     Morbid obesity- BMI 44  HTN  DM     Difficulty with intubation is not anticipated.    Cardiac Risk Estimation: low intraoperative cardiac risk     1.) Preoperative workup as follows: hemoglobin, hematocrit, electrolytes, creatinine, glucose, Hgb A1C.  2.) Change in medication regimen before surgery: discontinue antihypertensives (Ramipril) to avoid hypotension from epidural anesthesia and Hold Actos, levemir, and Novolog am of surgery and take only 10untis levemir night prior to surgery, .  3.) Prophylaxis for cardiac events with perioperative beta-blockers: not indicated.  4.) Invasive hemodynamic monitoring perioperatively: not  indicated.  5.) Deep vein thrombosis prophylaxis postoperatively: regimen to be chosen by surgical team.  6.) Surveillance for postoperative MI with ECG immediately postoperatively and on postoperati ve days 1 and 2 AND troponin levels 24 hours postoperatively and on day 4 or hospital discharge (whichever comes first): not indicated.  7.) Current medications which may produce withdrawal symptoms if withheld perioperatively: none  8.) Other measures: Careful attention to perioperative glycemic control (accucheck pre/post surgery ).    Hypertension associated with diabetes  Cont Ramipril  /87 today- pt did not take BP med today  Instructed to hold Ramipril for day before surgery and am of surgery    Uncontrolled type 2 diabetes mellitus with diabetic polyneuropathy, with long-term current use of insulin  Hgb A1C was 11.7 on 3/21/18  Recheck Hgb A1c   Cont Actos, Novolog, Trulicity, and Levemir 20units BID  Take only 10 units Levemir night prior to surgery  Hold Actos, Levemir, and Novolog am of surgery- pt will bring home medications to surgery    Morbid obesity with BMI of 40.0-44.9, adult  Pt was prescribed Phentermine but has not started the medication yet  Instructed to hold 14 days prior to surgery     Anemia  Appears stable   F/U with PCP    Hyperlipidemia associated with type 2 diabetes mellitus  Cont Simvastatin

## 2019-09-30 NOTE — ASSESSMENT & PLAN NOTE
Cont Ramipril  /87 today- pt did not take BP med today  Instructed to hold Ramipril for day before surgery and am of surgery

## 2019-09-30 NOTE — H&P
Preoperative History and Physical                                                             Hospital Medicine      Chief Complaint: Preoperative evaluation     History of Present Illness:      Maikol Pacheco is a 39 y.o. female with DM, HTN,. Morbid obesity, HLD, Carpal tunnel syndrome who presents to the office today for a preoperative consultation at the request of Dr. Aguilar who plans on performing left carpal tunnel release on October 17.     Functional Status:      The patient is able to climb a flight of stairs. The patient is able to ambulate only one block without difficulty. The patient's functional status is not affected by the surgical problem. The patient's functional status is not affected by shortness of breath, chest pain, dyspnea on exertion and fatigue.  Pt reports back pain limits walking distance.     MET score less than 4    Past Medical History:      Past Medical History:   Diagnosis Date    Anemia     Diabetes mellitus type II 2008    BS- 400's last week    Gastroparesis     Hyperlipidemia     Hypertension     Morbid obesity         Past Surgical History:      Past Surgical History:   Procedure Laterality Date    reshaping balloon endoscopy procedure       TONSILLECTOMY          Social History:      Social History     Socioeconomic History    Marital status: Single     Spouse name: Not on file    Number of children: 0    Years of education: Not on file    Highest education level: Not on file   Occupational History     Employer: [employer]   Social Needs    Financial resource strain: Not on file    Food insecurity:     Worry: Not on file     Inability: Not on file    Transportation needs:     Medical: Not on file     Non-medical: Not on file   Tobacco Use    Smoking status: Former Smoker     Years: 20.00    Smokeless tobacco: Never Used   Substance and Sexual Activity    Alcohol use: Yes     Frequency: Monthly or less      Comment: socially    Drug use: No    Sexual activity: Yes     Partners: Male     Birth control/protection: Condom   Lifestyle    Physical activity:     Days per week: Not on file     Minutes per session: Not on file    Stress: Not on file   Relationships    Social connections:     Talks on phone: Not on file     Gets together: Not on file     Attends [employer] service: Not on file     Active member of club or organization: Not on file     Attends meetings of clubs or organizations: Not on file     Relationship status: Not on file   Other Topics Concern    Not on file   Social History Narrative    Single. Lives with mom. Has no children. 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         Family History:      Family History   Problem Relation Age of Onset    Hyperlipidemia Mother     Diabetes Maternal Grandfather     Cancer Maternal Aunt         breast    Diabetes Maternal Grandmother     Stroke Maternal Grandmother     Hypertension Other        Allergies:      Review of patient's allergies indicates:   Allergen Reactions    Clindamycin Hives, Itching and Rash       Medications:      Current Outpatient Medications   Medication Sig    buPROPion (WELLBUTRIN) 100 MG tablet Take 1 tablet (100 mg total) by mouth 2 (two) times daily. (Patient taking differently: Take 100 mg by mouth 3 (three) times daily. Take am of surgery)    cyclobenzaprine (FLEXERIL) 10 MG tablet Take 10 mg by mouth 3 (three) times daily as needed for Muscle spasms.    dulaglutide (TRULICITY) 1.5 mg/0.5 mL PnIj Inject 1.5 mg into the skin every 7 days.    ferrous sulfate 325 mg (65 mg iron) Tab tablet Take 325 mg by mouth once daily.     insulin aspart U-100 (NOVOLOG FLEXPEN U-100 INSULIN) 100 unit/mL (3 mL) InPn pen TITRATE UP TO 24 UNITS SUBCUE 10-15 MIN BEFORE MEALS 3 TIMES A DAY AS DIRECTED (Patient taking differently: TITRATE UP TO 24 UNITS SUBCUE 10-15 MIN BEFORE MEALS 3 TIMES A DAY AS DIRECTED    Hold am of surgery)     "LEVEMIR FLEXTOUCH U-100 INSULN 100 unit/mL (3 mL) InPn pen INJECT 20 UNITS INTO THE SKIN 2 (TWO) TIMES DAILY. (Patient taking differently: Take 10 units evening prior to surgery, hold am of surgery)    pioglitazone (ACTOS) 15 MG tablet Take 1 tablet (15 mg total) by mouth once daily. (Patient taking differently: Take 15 mg by mouth once daily. Hold am of surgery)    ramipril (ALTACE) 5 MG capsule Take 1 capsule (5 mg total) by mouth once daily. (Patient taking differently: Take 5 mg by mouth once daily. Hold morning before surgery and am of surgery)    simvastatin (ZOCOR) 40 MG tablet Take 1 tablet (40 mg total) by mouth every evening.    blood sugar diagnostic Strp 1 strip by Misc.(Non-Drug; Combo Route) route 2 (two) times daily. Test strips covered by [facility]    blood-glucose meter kit Blood glucose Meter kit covered by [facility]. Use as instructed    blood-glucose meter,continuous (DEXCOM G6 ) Misc Use per 's instructions    blood-glucose sensor (DEXCOM G6 SENSOR) Shyanne Use one per 10 days per 's instruction.    blood-glucose transmitter (DEXCOM G6 TRANSMITTER) Shyanne Use per 's instructions    flash glucose scanning reader (FREESTYLE VASILE 14 DAY READER) Misc 1 each by Misc.(Non-Drug; Combo Route) route once daily.    flash glucose sensor (FREESTYLE VASILE 14 DAY SENSOR) Kit 1 each by Misc.(Non-Drug; Combo Route) route every 14 (fourteen) days.    lancets 33 gauge Misc 1 lancet by Misc.(Non-Drug; Combo Route) route 2 (two) times daily. Covered by [facility]    pen needle, diabetic (BD ULTRA-FINE MINI PEN NEEDLE) 31 gauge x 3/16" Ndle AS DIRECTED    phentermine 37.5 MG capsule Take 37.5 mg by mouth every morning. Hold 14 days prior to surgery     No current facility-administered medications for this encounter.        Vitals:      Vitals:    09/30/19 1033   BP: (!) 147/87   Pulse: 78   Resp: 16   Temp: 98.5 °F (36.9 °C)       Review of Systems:      "   Constitutional: Negative for fever, chills, weight loss, malaise/fatigue and diaphoresis.   HENT: Negative for hearing loss, ear pain, nosebleeds, congestion, sore throat, neck pain, tinnitus and ear discharge.    Eyes: Negative for blurred vision, double vision, photophobia, pain, discharge and redness.   Respiratory: Negative for cough, hemoptysis, sputum production, shortness of breath, wheezing and stridor.    Cardiovascular: Negative for chest pain, palpitations, orthopnea, claudication, leg swelling and PND.   Gastrointestinal: Negative for heartburn, nausea, vomiting, abdominal pain, diarrhea, constipation, blood in stool and melena.   Genitourinary: Negative for dysuria, urgency, frequency, hematuria and flank pain.   Musculoskeletal: +chronic back pain +wrist pain Negative for myalgias,joint pain and falls.   Skin: Negative for itching and rash.   Neurological: Negative for dizziness, tingling, tremors, sensory change, speech change, focal weakness, seizures, loss of consciousness, weakness and headaches.   Endo/Heme/Allergies: Negative for environmental allergies and polydipsia. Does not bruise/bleed easily.   Psychiatric/Behavioral: Negative for depression, suicidal ideas, hallucinations, memory loss and substance abuse. The patient is not nervous/anxious and does not have insomnia.    All 14 systems reviewed and negative except as noted above.    Physical Exam:      Constitutional: Appears well-developed, well-nourished and in no acute distress.  Patient is oriented to person, place, and time.   Head: Normocephalic and atraumatic. Mucous membranes moist.  Neck: Neck supple no mass.   Cardiovascular: Normal rate and regular rhythm.  S1 S2 appreciated by ascultation.  Pulmonary/Chest: Effort normal and clear to auscultation bilaterally. No respiratory distress.   Abdomen: Soft. Non-tender and non-distended. Bowel sounds are normal.   Neurological: Patient is alert and oriented to person, place and time.  Moves all extremities.  Skin: Warm and dry. No lesions.  Extremities: No clubbing, cyanosis or edema.    Laboratory data:      Reviewed and noted in plan where applicable. Please see chart for full laboratory data.    No results for input(s): CPK, CPKMB, TROPONINI, MB in the last 24 hours. No results for input(s): POCTGLUCOSE in the last 24 hours.     No results found for: INR, PROTIME    Lab Results   Component Value Date    WBC 7.63 09/30/2019    HGB 10.6 (L) 09/30/2019    HCT 33.5 (L) 09/30/2019    MCV 84 09/30/2019     (H) 09/30/2019       Recent Labs   Lab 09/30/19  1030   GLU 90      K 3.6      CO2 24   BUN 13   CREATININE 0.7   CALCIUM 8.7       Predictors of intubation difficulty:       Morbid obesity? yes - BMI 44   Anatomically abnormal facies? no   Prominent incisors? no   Receding mandible? no   Short, thick neck? yes -    Neck range of motion: normal   Dentition: Missing teeth (several)    Cardiographics:      ECG: normal sinus rhythm, no blocks or conduction defects, no ischemic changes  9/30/19    Imaging:      Chest x-ray:none     Assessment and Plan:      Carpal tunnel syndrome  The patient has carpal tunnel syndrome and will have a left carpal tunnel release on 10/17/19 per Dr. Aguilar     Known risk factors for perioperative complications:     Morbid obesity- BMI 44  HTN  DM     Difficulty with intubation is not anticipated.    Cardiac Risk Estimation: low intraoperative cardiac risk     1.) Preoperative workup as follows: hemoglobin, hematocrit, electrolytes, creatinine, glucose, Hgb A1C.  2.) Change in medication regimen before surgery: discontinue antihypertensives (Ramipril) to avoid hypotension from epidural anesthesia and Hold Actos, levemir, and Novolog am of surgery and take only 10untis levemir night prior to surgery, .  3.) Prophylaxis for cardiac events with perioperative beta-blockers: not indicated.  4.) Invasive hemodynamic monitoring perioperatively: not  indicated.  5.) Deep vein thrombosis prophylaxis postoperatively: regimen to be chosen by surgical team.  6.) Surveillance for postoperative MI with ECG immediately postoperatively and on postoperati ve days 1 and 2 AND troponin levels 24 hours postoperatively and on day 4 or hospital discharge (whichever comes first): not indicated.  7.) Current medications which may produce withdrawal symptoms if withheld perioperatively: none  8.) Other measures: Careful attention to perioperative glycemic control (accucheck pre/post surgery ).    Hypertension associated with diabetes  Cont Ramipril  /87 today- pt did not take BP med today  Instructed to hold Ramipril for day before surgery and am of surgery    Uncontrolled type 2 diabetes mellitus with diabetic polyneuropathy, with long-term current use of insulin  Hgb A1C was 11.7 on 3/21/18  Recheck Hgb A1c   Cont Actos, Novolog, Trulicity, and Levemir 20units BID  Take only 10 units Levemir night prior to surgery  Hold Actos, Levemir, and Novolog am of surgery- pt will bring home medications to surgery    Morbid obesity with BMI of 40.0-44.9, adult  Pt was prescribed Phentermine but has not started the medication yet  Instructed to hold 14 days prior to surgery     Anemia  Appears stable   F/U with PCP    Hyperlipidemia associated with type 2 diabetes mellitus  Cont Simvastatin

## 2019-09-30 NOTE — ASSESSMENT & PLAN NOTE
The patient has carpal tunnel syndrome and will have a left carpal tunnel release on 10/17/19 per Dr. Aguilar     Known risk factors for perioperative complications:     Morbid obesity- BMI 44  HTN  DM     Difficulty with intubation is not anticipated.    Cardiac Risk Estimation: low intraoperative cardiac risk     1.) Preoperative workup as follows: hemoglobin, hematocrit, electrolytes, creatinine, glucose, Hgb A1C.  2.) Change in medication regimen before surgery: discontinue antihypertensives (Ramipril) to avoid hypotension from epidural anesthesia and Hold Actos, levemir, and Novolog am of surgery and take only 10untis levemir night prior to surgery, .  3.) Prophylaxis for cardiac events with perioperative beta-blockers: not indicated.  4.) Invasive hemodynamic monitoring perioperatively: not indicated.  5.) Deep vein thrombosis prophylaxis postoperatively: regimen to be chosen by surgical team.  6.) Surveillance for postoperative MI with ECG immediately postoperatively and on postoperati ve days 1 and 2 AND troponin levels 24 hours postoperatively and on day 4 or hospital discharge (whichever comes first): not indicated.  7.) Current medications which may produce withdrawal symptoms if withheld perioperatively: none  8.) Other measures: Careful attention to perioperative glycemic control (accucheck pre/post surgery ).

## 2019-09-30 NOTE — ASSESSMENT & PLAN NOTE
Pt was prescribed Phentermine but has not started the medication yet  Instructed to hold 14 days prior to surgery

## 2019-09-30 NOTE — DISCHARGE INSTRUCTIONS
To confirm, Your doctor has instructed you that surgery is scheduled for 10/17/2019.       Please report to [facility].  Pre admit office will call afternoon prior to surgery with final arrival time    INSTRUCTIONS IMPORTANT!!!   Do not eat, drink, or smoke after 12 midnight-including water. OK to brush teeth, no gum, candy or mints!    ¨ Take only these medicines with a small swallow of water-morning of surgery.  Bupropion    Pre operative instructions:  Please review the Pre-Operative Instruction booklet that you were given.        Bathing Instructions--See page 6 in the Pre-operative booklet.      Prevention of surgical site infections:     -Keep incisions clean and dry.   -Do not soak/submerge incisions in water until completely healed.   -Do not apply lotions, powders, creams, or deodorants to site.   -Always make sure hands are cleaned with antibacterial soap/ alcohol-based  prior to touching the surgical site.  (This includes doctors, nurses, staff, and yourself.)    Signs and symptoms:   -Redness and pain around the area where you had surgery   -Drainage of cloudy fluid from your surgical wound   -Fever over 100.4       I have read or had read and explained to me, and understand the above information.  Additional comments or instructions:  Received a copy of Pre-operative instructions booklet, FAQ surgical site infection sheet, and packets of hibiclens (if indicated).

## 2019-09-30 NOTE — ASSESSMENT & PLAN NOTE
Hgb A1C was 11.7 on 3/21/18  Recheck Hgb A1c   Cont Actos, Novolog, Trulicity, and Levemir 20units BID  Take only 10 units Levemir night prior to surgery  Hold Actos, Levemir, and Novolog am of surgery- pt will bring home medications to surgery

## 2019-10-01 ENCOUNTER — PATIENT MESSAGE (OUTPATIENT)
Dept: DIABETES | Facility: CLINIC | Age: 39
End: 2019-10-01

## 2019-10-13 NOTE — PROGRESS NOTES
Subjective:      Patient ID: Maikol Pacheco is a 39 y.o. female.    PCP: Maisha Bartholomew MD      Maikol Pacheco is a pleasant 39 y.o. female presenting to follow up on Type 2 diabetes mellitus.  Also, pertinent to this visit in decision making is hypertension, hyperlipidemia, and adherence.  She has had diabetes for 6 or more years.  Her last visit in Diabetes Management was 5/29/2019 .   Since that time she has been in her usual state of health without significant hyperglycemia or hypoglycemia. She has been checking her blood glucose regularly four times daily, before meals and HS.  Since that time she has had moderate improvement in her glycemia with A1c of 9.2%.   She is currently on MDI with Levemir and NovoLog, Pioglitazone, Trulicity.  Her current concerns are glycemic control.    She denies any hospital admissions, emergency room visits, hypoglycemia, syncope, diaphoresis, chest pain, or dyspnea.    She has gained 3 pounds since last visit. Her BMI is 46.33 kg/m².    Her blood sugar in the clinic today was:   Lab Results   Component Value Date    POCGLU 77 10/14/2019     Maikol is noncompliant some of the time with DM medications. Misses more than 50% of her injections.    Maikol is noncompliant some of the time with lifestyle modifications to include activity and meal planning.     Diabetes Management Status    Statin: Taking  ACE/ARB: Taking    Screening or Prevention Patient's value Goal Complete/Controlled?   HgA1C Testing and Control   Lab Results   Component Value Date    HGBA1C 9.2 (H) 09/30/2019      Annually/Less than 8% No   Lipid profile : 05/30/2019 Annually Yes   LDL control Lab Results   Component Value Date    LDLCALC 110.2 05/30/2019    Annually/Less than 100 mg/dl  Yes   Nephropathy screening Lab Results   Component Value Date    LABMICR 4.0 03/21/2018     Lab Results   Component Value Date    PROTEINUA Trace (A) 05/30/2019    Annually Yes   Blood pressure BP Readings from Last  "1 Encounters:   10/14/19 (!) 132/92    Less than 140/90 Yes   Dilated retinal exam : 06/05/2019 Annually No   Foot exam   : 05/29/2019 Annually Yes         Lab Results   Component Value Date    HGBA1C 9.2 (H) 09/30/2019    HGBA1C 11.7 (H) 03/21/2018    HGBA1C 10.3 (H) 08/24/2017     Lab Results   Component Value Date    CPEPTIDE 2.2 12/29/2016    CPEPTIDE 1.7 01/17/2015     Review of Systems   Constitutional: Negative for activity change and unexpected weight change.   Eyes: Negative for visual disturbance.   Respiratory: Negative for chest tightness and shortness of breath.    Cardiovascular: Negative for chest pain.   Gastrointestinal: Negative for constipation and diarrhea.   Endocrine: Negative for cold intolerance, heat intolerance, polydipsia, polyphagia and polyuria.   Genitourinary: Negative for frequency.   Skin: Negative for wound.   Neurological: Negative for numbness.   Psychiatric/Behavioral: Negative for confusion.      Objective:   BP (!) 132/92   Ht 5' 2" (1.575 m)   Wt 114.9 kg (253 lb 4.9 oz)   LMP 10/11/2019 (Exact Date)   BMI 46.33 kg/m²       Physical Exam   Constitutional: She is oriented to person, place, and time. She appears well-developed and well-nourished. No distress.   Neck: Normal range of motion. Neck supple. No tracheal deviation present. No thyromegaly present.   Cardiovascular: Normal rate, regular rhythm and normal heart sounds.   Pulmonary/Chest: Effort normal and breath sounds normal.   Abdominal: Soft. Bowel sounds are normal.   Musculoskeletal: She exhibits no edema.   Lymphadenopathy:     She has no cervical adenopathy.   Neurological: She is alert and oriented to person, place, and time.   Skin: She is not diaphoretic.   Psychiatric: She has a normal mood and affect.   Nursing note and vitals reviewed.    Assessment:     1. Diabetes mellitus type 2, uncontrolled, with complications    2. Uncontrolled type 2 diabetes mellitus with diabetic polyneuropathy, with long-term " "current use of insulin       Plan:   Maikol Pacheco is seen today for   1. Diabetes mellitus type 2, uncontrolled, with complications    2. Uncontrolled type 2 diabetes mellitus with diabetic polyneuropathy, with long-term current use of insulin        Diabetes mellitus type 2, uncontrolled, with complications  -     POCT Glucose, Hand-Held Device  -     insulin aspart U-100 (NOVOLOG FLEXPEN U-100 INSULIN) 100 unit/mL (3 mL) InPn pen; TITRATE UP TO 26 UNITS SUBCUE 10-15 MIN BEFORE MEALS 3 TIMES A DAY AS DIRECTED  Dispense: 15 Syringe; Refill: 0  -     insulin detemir U-100 (LEVEMIR FLEXTOUCH U-100 INSULN) 100 unit/mL (3 mL) SubQ InPn pen; INJECT 20 UNITS INTO THE SKIN 2 (TWO) TIMES DAILY.  Dispense: 15 mL; Refill: 0  -     pioglitazone (ACTOS) 15 MG tablet; Take 1 tablet (15 mg total) by mouth once daily.  Dispense: 30 tablet; Refill: 11  -     dulaglutide (TRULICITY) 1.5 mg/0.5 mL PnIj; Inject 1.5 mg into the skin every 7 days.  Dispense: 3 mL; Refill: 1  -     blood sugar diagnostic Strp; 1 strip by Misc.(Non-Drug; Combo Route) route 2 (two) times daily. Test strips covered by insurance  Dispense: 100 strip; Refill: 1    Uncontrolled type 2 diabetes mellitus with diabetic polyneuropathy, with long-term current use of insulin    Uncontrolled type 2 diabetes mellitus with diabetic polyneuropathy, with long-term current use of insulin  -     POCT glucose  -  Lack of compliance  - Referral to OPCM Today  - Have intensified insulin therapy today.     I have reviewed your results and they are still quite high. I would like you to adjust "Treating to Target". The treatment will be Insulin and your target will be the Fasting and 2 hour post meal blood sugar. It will work in this manner;    1. Goal for Fasting blood sugar is  mg/dl. I realize that you will need time to adjust to the new levels and presently you may feel too low if you are too aggressive now. So go slow and aim to lower your blood sugar to below " "200 then 150 then 100 over several months.    2. Goal for 2 hour post meal blood sugar is below 180 mg/dl, here the same rules apply as in #1.    3. You will check your fasting blood sugar daily, if not where we would like it to be over a 3 day period then that evening we will increase the Levemir dose by 5 units. Then repeat the process over the next 3 days. Remember this is a slow process and take our time getting to goal. But, each week should be better than prior weeks. Blood sugars below 70 are unacceptable and should raise a "RED FLAG" where we may have to reduce our dose of insulin.    4. You will check your post meal glucose daily as well. However, each day you will check a different meal, (ie. Monday-breakfast; Tuesday- lunch; Wednesday- supper, then repeat). If your post meal glucose is not where we would like, increase pre-meal insulin by 2 units next time. A word of CAUTION: mealtime insulin is dependant on the size and concentration of your meal content. If not consuming a large meal do not take large dose of insulin. Use the reasonable person rule.     5. If you have any questions please do not hesitate to call.      Intensive insulin Therapy with correction factor:    You are on Intensive insulin therapy with Basal and Bolus insulin. Lantus, Levamir or NPH is your Basal insulin and will help maintain your fasting and between meal sugar. Your fast acting or rescue insulin is either Humalog, Novalog or Regular insulin and will control your post meal sugar.     You will Take 30 units of Levemir twice daily at least 12 hours apart. This will be adjusted up or down depending on your fasting blood sugar before breakfast.    Novolog will follow this pre meal schedule; Correction factor of "2 units per 50 mg/dl" and is based on 30-60 grams of carbohydrates per meal.    If blood sugar is below 70 eat first then check your blood sugar 2 hours later and make correction.  If blood pre-meal sugar is  70 -150 take " 16 units of Novolog;  If blood pre-meal sugar is 151-200 take +2 units of Novolog;  If blood pre-meal sugar is 201-250 take +4 units of Novolog;  If blood pre-meal sugar is 251-300 take +6 units of Novolog;  If blood pre-meal sugar is 301-350 take +8 units of Novolog;  If blood pre-meal sugar is 351-400+ take +10 units of Novolog;  Also increase water intake and call for appointment.    A total of 30 minutes was spent in face to face time, of which 50 % was spent in counseling patient on disease process, complications, treatment, and side effects of medications.    The patient was explained the above plan and given opportunity to ask questions.  She understands, chooses and consents to this plan and accepts all the risks, which include but are not limited to the risks mentioned above.   She understands the alternative of having no testing, interventions or treatments at this time. She left content and without further questions.

## 2019-10-14 ENCOUNTER — OFFICE VISIT (OUTPATIENT)
Dept: DIABETES | Facility: CLINIC | Age: 39
End: 2019-10-14
Payer: COMMERCIAL

## 2019-10-14 ENCOUNTER — OFFICE VISIT (OUTPATIENT)
Dept: INTERNAL MEDICINE | Facility: CLINIC | Age: 39
End: 2019-10-14
Payer: COMMERCIAL

## 2019-10-14 VITALS
SYSTOLIC BLOOD PRESSURE: 132 MMHG | BODY MASS INDEX: 46.61 KG/M2 | BODY MASS INDEX: 46.61 KG/M2 | SYSTOLIC BLOOD PRESSURE: 132 MMHG | TEMPERATURE: 97 F | WEIGHT: 253.31 LBS | HEART RATE: 93 BPM | HEIGHT: 62 IN | DIASTOLIC BLOOD PRESSURE: 88 MMHG | RESPIRATION RATE: 16 BRPM | DIASTOLIC BLOOD PRESSURE: 92 MMHG | WEIGHT: 253.31 LBS | HEIGHT: 62 IN | OXYGEN SATURATION: 99 %

## 2019-10-14 DIAGNOSIS — E11.69 HYPERLIPIDEMIA ASSOCIATED WITH TYPE 2 DIABETES MELLITUS: Chronic | ICD-10-CM

## 2019-10-14 DIAGNOSIS — E11.59 HYPERTENSION ASSOCIATED WITH DIABETES: Chronic | ICD-10-CM

## 2019-10-14 DIAGNOSIS — M79.671 RIGHT FOOT PAIN: ICD-10-CM

## 2019-10-14 DIAGNOSIS — E78.5 HYPERLIPIDEMIA ASSOCIATED WITH TYPE 2 DIABETES MELLITUS: Chronic | ICD-10-CM

## 2019-10-14 DIAGNOSIS — I15.2 HYPERTENSION ASSOCIATED WITH DIABETES: Chronic | ICD-10-CM

## 2019-10-14 DIAGNOSIS — F32.A MILD DEPRESSION: ICD-10-CM

## 2019-10-14 DIAGNOSIS — F41.8 ANXIETY ASSOCIATED WITH DEPRESSION: Primary | ICD-10-CM

## 2019-10-14 DIAGNOSIS — E66.01 MORBID OBESITY WITH BMI OF 45.0-49.9, ADULT: ICD-10-CM

## 2019-10-14 LAB — GLUCOSE SERPL-MCNC: 77 MG/DL (ref 70–110)

## 2019-10-14 PROCEDURE — 3046F PR MOST RECENT HEMOGLOBIN A1C LEVEL > 9.0%: ICD-10-PCS | Mod: CPTII,S$GLB,, | Performed by: PHYSICIAN ASSISTANT

## 2019-10-14 PROCEDURE — 3075F PR MOST RECENT SYSTOLIC BLOOD PRESS GE 130-139MM HG: ICD-10-PCS | Mod: CPTII,S$GLB,, | Performed by: FAMILY MEDICINE

## 2019-10-14 PROCEDURE — 3008F BODY MASS INDEX DOCD: CPT | Mod: CPTII,S$GLB,, | Performed by: PHYSICIAN ASSISTANT

## 2019-10-14 PROCEDURE — 3078F PR MOST RECENT DIASTOLIC BLOOD PRESSURE < 80 MM HG: ICD-10-PCS | Mod: CPTII,S$GLB,, | Performed by: PHYSICIAN ASSISTANT

## 2019-10-14 PROCEDURE — 3075F PR MOST RECENT SYSTOLIC BLOOD PRESS GE 130-139MM HG: ICD-10-PCS | Mod: CPTII,S$GLB,, | Performed by: PHYSICIAN ASSISTANT

## 2019-10-14 PROCEDURE — 3046F PR MOST RECENT HEMOGLOBIN A1C LEVEL > 9.0%: ICD-10-PCS | Mod: CPTII,S$GLB,, | Performed by: FAMILY MEDICINE

## 2019-10-14 PROCEDURE — 82962 GLUCOSE BLOOD TEST: CPT | Mod: S$GLB,,, | Performed by: PHYSICIAN ASSISTANT

## 2019-10-14 PROCEDURE — 3008F BODY MASS INDEX DOCD: CPT | Mod: CPTII,S$GLB,, | Performed by: FAMILY MEDICINE

## 2019-10-14 PROCEDURE — 99999 PR PBB SHADOW E&M-EST. PATIENT-LVL III: ICD-10-PCS | Mod: PBBFAC,,, | Performed by: FAMILY MEDICINE

## 2019-10-14 PROCEDURE — 99999 PR PBB SHADOW E&M-EST. PATIENT-LVL III: ICD-10-PCS | Mod: PBBFAC,,, | Performed by: PHYSICIAN ASSISTANT

## 2019-10-14 PROCEDURE — 3075F SYST BP GE 130 - 139MM HG: CPT | Mod: CPTII,S$GLB,, | Performed by: PHYSICIAN ASSISTANT

## 2019-10-14 PROCEDURE — 99214 PR OFFICE/OUTPT VISIT, EST, LEVL IV, 30-39 MIN: ICD-10-PCS | Mod: S$GLB,,, | Performed by: PHYSICIAN ASSISTANT

## 2019-10-14 PROCEDURE — 99999 PR PBB SHADOW E&M-EST. PATIENT-LVL III: CPT | Mod: PBBFAC,,, | Performed by: PHYSICIAN ASSISTANT

## 2019-10-14 PROCEDURE — 3008F PR BODY MASS INDEX (BMI) DOCUMENTED: ICD-10-PCS | Mod: CPTII,S$GLB,, | Performed by: PHYSICIAN ASSISTANT

## 2019-10-14 PROCEDURE — 99214 OFFICE O/P EST MOD 30 MIN: CPT | Mod: S$GLB,,, | Performed by: PHYSICIAN ASSISTANT

## 2019-10-14 PROCEDURE — 3046F HEMOGLOBIN A1C LEVEL >9.0%: CPT | Mod: CPTII,S$GLB,, | Performed by: FAMILY MEDICINE

## 2019-10-14 PROCEDURE — 3079F DIAST BP 80-89 MM HG: CPT | Mod: CPTII,S$GLB,, | Performed by: FAMILY MEDICINE

## 2019-10-14 PROCEDURE — 3078F DIAST BP <80 MM HG: CPT | Mod: CPTII,S$GLB,, | Performed by: PHYSICIAN ASSISTANT

## 2019-10-14 PROCEDURE — 3079F PR MOST RECENT DIASTOLIC BLOOD PRESSURE 80-89 MM HG: ICD-10-PCS | Mod: CPTII,S$GLB,, | Performed by: FAMILY MEDICINE

## 2019-10-14 PROCEDURE — 82962 POCT GLUCOSE, HAND-HELD DEVICE: ICD-10-PCS | Mod: S$GLB,,, | Performed by: PHYSICIAN ASSISTANT

## 2019-10-14 PROCEDURE — 99999 PR PBB SHADOW E&M-EST. PATIENT-LVL III: CPT | Mod: PBBFAC,,, | Performed by: FAMILY MEDICINE

## 2019-10-14 PROCEDURE — 3075F SYST BP GE 130 - 139MM HG: CPT | Mod: CPTII,S$GLB,, | Performed by: FAMILY MEDICINE

## 2019-10-14 PROCEDURE — 99214 PR OFFICE/OUTPT VISIT, EST, LEVL IV, 30-39 MIN: ICD-10-PCS | Mod: S$GLB,,, | Performed by: FAMILY MEDICINE

## 2019-10-14 PROCEDURE — 3046F HEMOGLOBIN A1C LEVEL >9.0%: CPT | Mod: CPTII,S$GLB,, | Performed by: PHYSICIAN ASSISTANT

## 2019-10-14 PROCEDURE — 3008F PR BODY MASS INDEX (BMI) DOCUMENTED: ICD-10-PCS | Mod: CPTII,S$GLB,, | Performed by: FAMILY MEDICINE

## 2019-10-14 PROCEDURE — 99214 OFFICE O/P EST MOD 30 MIN: CPT | Mod: S$GLB,,, | Performed by: FAMILY MEDICINE

## 2019-10-14 RX ORDER — ETODOLAC 400 MG/1
TABLET, FILM COATED ORAL
COMMUNITY
Start: 2019-08-24 | End: 2020-10-23

## 2019-10-14 RX ORDER — NAPROXEN 500 MG/1
TABLET ORAL
COMMUNITY
Start: 2019-09-30 | End: 2020-10-23

## 2019-10-14 RX ORDER — PIOGLITAZONEHYDROCHLORIDE 15 MG/1
15 TABLET ORAL DAILY
Qty: 30 TABLET | Refills: 11 | Status: SHIPPED | OUTPATIENT
Start: 2019-10-14 | End: 2020-05-14 | Stop reason: SDUPTHER

## 2019-10-14 RX ORDER — BUPROPION HYDROCHLORIDE 150 MG/1
150 TABLET, EXTENDED RELEASE ORAL 2 TIMES DAILY
Qty: 60 TABLET | Refills: 5 | Status: SHIPPED | OUTPATIENT
Start: 2019-10-14 | End: 2020-10-23 | Stop reason: SDUPTHER

## 2019-10-14 RX ORDER — INSULIN ASPART 100 [IU]/ML
INJECTION, SOLUTION INTRAVENOUS; SUBCUTANEOUS
Qty: 15 SYRINGE | Refills: 0 | Status: SHIPPED | OUTPATIENT
Start: 2019-10-14 | End: 2019-11-07 | Stop reason: SDUPTHER

## 2019-10-14 NOTE — PROGRESS NOTES
Subjective:       Patient ID: Maikol Pacheco is a 39 y.o. female.    Chief Complaint: Follow-up (6 Week) and Foot Pain (Right)    39-year-old  female patient with Patient Active Problem List:     Hyperlipidemia associated with type 2 diabetes mellitus     Hypertension associated with diabetes     Anemia     Uncontrolled type 2 diabetes mellitus with diabetic polyneuropathy, with long-term current use of insulin     Morbid obesity with BMI of 45.0-49.9, adult     Chronic idiopathic constipation     Carpal tunnel syndrome  Here for follow-up on anxiety, reports that she has been taking Wellbutrin 100 mg twice daily but no significant improvement noted with anxiety, started therapy sessions for the past few weeks.  Denies any suicidal homicidal thoughts but continues to have anxiety associated with depression.   Patient has been monitoring her blood glucose levels which has been running in the range of 160s to 200s  Reports that she started to have right foot pain for the past 2 weeks especially with walking, up to 4/10, denies any redness injury or trauma, tingling or numbness sensation to extremities    Review of Systems   Constitutional: Negative for fatigue.   Eyes: Negative for visual disturbance.   Respiratory: Negative for shortness of breath.    Cardiovascular: Negative for chest pain and leg swelling.   Gastrointestinal: Negative for abdominal pain, nausea and vomiting.   Endocrine: Negative for polydipsia, polyphagia and polyuria.   Musculoskeletal: Positive for arthralgias and myalgias. Negative for joint swelling.   Skin: Negative for rash.   Neurological: Negative for light-headedness and headaches.   Psychiatric/Behavioral: Positive for dysphoric mood. Negative for sleep disturbance. The patient is nervous/anxious.          BP (!) 132/92 (BP Location: Left arm, Patient Position: Sitting, BP Method: Large (Manual))   Pulse 93   Temp 96.9 °F (36.1 °C) (Tympanic)   Resp 16   Ht  "5' 2" (1.575 m)   Wt 114.9 kg (253 lb 4.9 oz)   LMP 10/11/2019 (Exact Date)   SpO2 99%   BMI 46.33 kg/m²   Objective:      Physical Exam   Constitutional: She is oriented to person, place, and time. She appears well-developed and well-nourished.   HENT:   Head: Normocephalic and atraumatic.   Mouth/Throat: Oropharynx is clear and moist.   Cardiovascular: Normal rate, regular rhythm and normal heart sounds.   No murmur heard.  Pulmonary/Chest: Effort normal and breath sounds normal. She has no wheezes.   Abdominal: Soft. Bowel sounds are normal. There is no tenderness.   Musculoskeletal: She exhibits no edema or tenderness.   No significant tenderness noted on exam today to the right foot   Neurological: She is alert and oriented to person, place, and time.   Skin: Skin is warm and dry. No rash noted. No erythema.   Psychiatric: She has a normal mood and affect.         Assessment/Plan:   1. Anxiety associated with depression  2. Mild depression  Will increase Wellbutrin from 100-150 mg twice daily  Patient to continue counseling sessions and let us know if there is any improvement    3. Uncontrolled type 2 diabetes mellitus with diabetic polyneuropathy, with long-term current use of insulin  Severely uncontrolled A1c  Continue current regimen and start working on lowering blood glucose levels  Follow-up with diabetes clinic    4. Hypertension associated with diabetes  Blood pressure mildly elevated but stable, currently on ramipril 5 mg daily    5. Hyperlipidemia associated with type 2 diabetes mellitus  Stable on simvastatin 40 mg daily    6. Morbid obesity with BMI of 45.0-49.9, adult  Encouraged to work on lifestyle changes with diet and exercise to lose weight with BMI 46    7. Right foot pain   Patient was advised to try over-the-counter Tylenol as needed for pain and can do Epsom salt soaks likely sprain  Wear comfortable shoes  Patient was encouraged to consider getting imaging of the right foot today, " would like to wait until tried conservative measures

## 2019-10-16 ENCOUNTER — TELEPHONE (OUTPATIENT)
Dept: ORTHOPEDICS | Facility: CLINIC | Age: 39
End: 2019-10-16

## 2019-10-16 ENCOUNTER — ANESTHESIA EVENT (OUTPATIENT)
Dept: SURGERY | Facility: HOSPITAL | Age: 39
End: 2019-10-16
Payer: COMMERCIAL

## 2019-10-16 NOTE — ANESTHESIA PREPROCEDURE EVALUATION
10/16/2019  Maikol Pacheco is a 39 y.o., female.    Anesthesia Evaluation    I have reviewed the Patient Summary Reports.    I have reviewed the Nursing Notes.   I have reviewed the Medications.     Review of Systems  Anesthesia Hx:  No problems with previous Anesthesia  Denies Family Hx of Anesthesia complications.   Denies Personal Hx of Anesthesia complications.   Social:  No Alcohol Use, Non-Smoker    Hematology/Oncology:         -- Anemia: Hematology Comments: Stable.   Cardiovascular:   Hypertension hyperlipidemia ECG has been reviewed.    Pulmonary:  Pulmonary Normal    Renal/:  Renal/ Normal     Hepatic/GI:  Hepatic/GI Normal    Neurological:   Neuromuscular Disease, CTS.   Endocrine:   Diabetes, poorly controlled, type 2 A1C >10.   Psych:  Psychiatric Normal           Physical Exam  General:  Morbid Obesity    Airway/Jaw/Neck:  Airway Findings: Mouth Opening: Normal Tongue: Normal  General Airway Assessment: Adult  Mallampati: II  TM Distance: Normal, at least 6 cm  Jaw/Neck Findings:  Neck ROM: Normal ROM  Neck Findings:     Eyes/Ears/Nose:  Eyes/Ears/Nose Findings:    Dental:  Dental Findings: In tact   Chest/Lungs:  Chest/Lungs Findings: Clear to auscultation, Normal Respiratory Rate     Heart/Vascular:  Heart Findings: Rate: Normal  Rhythm: Regular Rhythm  Sounds: Normal  Heart murmur: negative Vascular Findings: Normal    Abdomen:  Abdomen Findings: Normal    Musculoskeletal:  Musculoskeletal Findings: Normal   Skin:  Skin Findings: Normal    Mental Status:  Mental Status Findings:  Alert and Oriented         Anesthesia Plan  Type of Anesthesia, risks & benefits discussed:  Anesthesia Type:  MAC  Patient's Preference:   Intra-op Monitoring Plan: standard ASA monitors  Intra-op Monitoring Plan Comments:   Post Op Pain Control Plan: per primary service following discharge from PACU,  peripheral nerve block and multimodal analgesia  Post Op Pain Control Plan Comments:   Induction:   IV  Beta Blocker:  Patient is not currently on a Beta-Blocker (No further documentation required).       Informed Consent: Patient understands risks and agrees with Anesthesia plan.  Questions answered. Anesthesia consent signed with patient.  ASA Score: 3     Day of Surgery Review of History & Physical:    H&P update referred to the surgeon.         Ready For Surgery From Anesthesia Perspective.

## 2019-10-16 NOTE — TELEPHONE ENCOUNTER
1st attempt..... Called the patient in regards to their message below. No answer No voice mail.FP    ----- Message from Sandra Maurice sent at 10/16/2019  1:19 PM CDT -----  Contact: [phone]  Would like to consult with the nurse, Patient would like to know what time she needs to be at the Hospital,  For her procedure,Please call back at [phone], Thanks sj

## 2019-10-17 ENCOUNTER — HOSPITAL ENCOUNTER (OUTPATIENT)
Facility: HOSPITAL | Age: 39
Discharge: HOME | End: 2019-10-17
Attending: ORTHOPAEDIC SURGERY | Admitting: ORTHOPAEDIC SURGERY
Payer: COMMERCIAL

## 2019-10-17 ENCOUNTER — ANESTHESIA (OUTPATIENT)
Dept: SURGERY | Facility: HOSPITAL | Age: 39
End: 2019-10-17
Payer: COMMERCIAL

## 2019-10-17 DIAGNOSIS — G56.02 CARPAL TUNNEL SYNDROME OF LEFT WRIST: ICD-10-CM

## 2019-10-17 DIAGNOSIS — G56.02 LEFT CARPAL TUNNEL SYNDROME: Primary | ICD-10-CM

## 2019-10-17 DIAGNOSIS — Z01.818 PREOP TESTING: ICD-10-CM

## 2019-10-17 LAB
B-HCG UR QL: NEGATIVE
CTP QC/QA: YES
POCT GLUCOSE: 182 MG/DL (ref 70–110)
POCT GLUCOSE: 212 MG/DL (ref 70–110)

## 2019-10-17 PROCEDURE — 64415 NJX AA&/STRD BRCH PLXS IMG: CPT | Mod: 59,LT,, | Performed by: ANESTHESIOLOGY

## 2019-10-17 PROCEDURE — 71000033 HC RECOVERY, INTIAL HOUR: Performed by: ORTHOPAEDIC SURGERY

## 2019-10-17 PROCEDURE — 64721 CARPAL TUNNEL SURGERY: CPT | Mod: LT,,, | Performed by: ORTHOPAEDIC SURGERY

## 2019-10-17 PROCEDURE — 76942 PR U/S GUIDANCE FOR NEEDLE GUIDANCE: ICD-10-PCS | Mod: 26,,, | Performed by: ANESTHESIOLOGY

## 2019-10-17 PROCEDURE — 37000009 HC ANESTHESIA EA ADD 15 MINS: Performed by: ORTHOPAEDIC SURGERY

## 2019-10-17 PROCEDURE — 63600175 PHARM REV CODE 636 W HCPCS: Performed by: ORTHOPAEDIC SURGERY

## 2019-10-17 PROCEDURE — D9220A PRA ANESTHESIA: ICD-10-PCS | Mod: CRNA,,, | Performed by: NURSE ANESTHETIST, CERTIFIED REGISTERED

## 2019-10-17 PROCEDURE — D9220A PRA ANESTHESIA: ICD-10-PCS | Mod: ANES,,, | Performed by: ANESTHESIOLOGY

## 2019-10-17 PROCEDURE — 64415 NJX AA&/STRD BRCH PLXS IMG: CPT | Performed by: ANESTHESIOLOGY

## 2019-10-17 PROCEDURE — 63600175 PHARM REV CODE 636 W HCPCS: Performed by: ANESTHESIOLOGY

## 2019-10-17 PROCEDURE — 64415 PR NERVE BLOCK INJ, ANES/STEROID, BRACHIAL PLEXUS, INCL IMAG GUIDANCE: ICD-10-PCS | Mod: 59,LT,, | Performed by: ANESTHESIOLOGY

## 2019-10-17 PROCEDURE — 36000706: Performed by: ORTHOPAEDIC SURGERY

## 2019-10-17 PROCEDURE — 76942 ECHO GUIDE FOR BIOPSY: CPT | Performed by: ANESTHESIOLOGY

## 2019-10-17 PROCEDURE — 25000003 PHARM REV CODE 250: Performed by: NURSE PRACTITIONER

## 2019-10-17 PROCEDURE — D9220A PRA ANESTHESIA: Mod: ANES,,, | Performed by: ANESTHESIOLOGY

## 2019-10-17 PROCEDURE — 36000707: Performed by: ORTHOPAEDIC SURGERY

## 2019-10-17 PROCEDURE — 76942 ECHO GUIDE FOR BIOPSY: CPT | Mod: 26,,, | Performed by: ANESTHESIOLOGY

## 2019-10-17 PROCEDURE — 71000015 HC POSTOP RECOV 1ST HR: Performed by: ORTHOPAEDIC SURGERY

## 2019-10-17 PROCEDURE — 63600175 PHARM REV CODE 636 W HCPCS: Performed by: NURSE ANESTHETIST, CERTIFIED REGISTERED

## 2019-10-17 PROCEDURE — D9220A PRA ANESTHESIA: Mod: CRNA,,, | Performed by: NURSE ANESTHETIST, CERTIFIED REGISTERED

## 2019-10-17 PROCEDURE — 64417 NJX AA&/STRD AX NERVE IMG: CPT | Performed by: ORTHOPAEDIC SURGERY

## 2019-10-17 PROCEDURE — 64721 PR REVISE MEDIAN N/CARPAL TUNNEL SURG: ICD-10-PCS | Mod: LT,,, | Performed by: ORTHOPAEDIC SURGERY

## 2019-10-17 PROCEDURE — 82962 GLUCOSE BLOOD TEST: CPT | Performed by: ORTHOPAEDIC SURGERY

## 2019-10-17 PROCEDURE — 37000008 HC ANESTHESIA 1ST 15 MINUTES: Performed by: ORTHOPAEDIC SURGERY

## 2019-10-17 PROCEDURE — 81025 URINE PREGNANCY TEST: CPT | Performed by: NURSE PRACTITIONER

## 2019-10-17 RX ORDER — FAMOTIDINE 20 MG/1
20 TABLET, FILM COATED ORAL
Status: COMPLETED | OUTPATIENT
Start: 2019-10-17 | End: 2019-10-17

## 2019-10-17 RX ORDER — CEFAZOLIN SODIUM 2 G/50ML
2 SOLUTION INTRAVENOUS
Status: CANCELLED | OUTPATIENT
Start: 2019-10-17

## 2019-10-17 RX ORDER — LIDOCAINE HCL/PF 100 MG/5ML
SYRINGE (ML) INTRAVENOUS
Status: DISCONTINUED | OUTPATIENT
Start: 2019-10-17 | End: 2019-10-17

## 2019-10-17 RX ORDER — TRIAMCINOLONE ACETONIDE 40 MG/ML
INJECTION, SUSPENSION INTRA-ARTICULAR; INTRAMUSCULAR
Status: DISCONTINUED | OUTPATIENT
Start: 2019-10-17 | End: 2019-10-17 | Stop reason: HOSPADM

## 2019-10-17 RX ORDER — SODIUM CHLORIDE, SODIUM LACTATE, POTASSIUM CHLORIDE, CALCIUM CHLORIDE 600; 310; 30; 20 MG/100ML; MG/100ML; MG/100ML; MG/100ML
INJECTION, SOLUTION INTRAVENOUS CONTINUOUS
Status: DISCONTINUED | OUTPATIENT
Start: 2019-10-17 | End: 2023-06-09

## 2019-10-17 RX ORDER — ALPRAZOLAM 0.25 MG/1
0.25 TABLET ORAL ONCE AS NEEDED
Status: COMPLETED | OUTPATIENT
Start: 2019-10-17 | End: 2019-10-17

## 2019-10-17 RX ORDER — FENTANYL CITRATE 50 UG/ML
25 INJECTION, SOLUTION INTRAMUSCULAR; INTRAVENOUS EVERY 5 MIN PRN
Status: DISCONTINUED | OUTPATIENT
Start: 2019-10-17 | End: 2019-10-17 | Stop reason: HOSPADM

## 2019-10-17 RX ORDER — FENTANYL CITRATE 50 UG/ML
INJECTION, SOLUTION INTRAMUSCULAR; INTRAVENOUS
Status: DISCONTINUED | OUTPATIENT
Start: 2019-10-17 | End: 2019-10-17

## 2019-10-17 RX ORDER — ACETAMINOPHEN 500 MG
1000 TABLET ORAL
Status: COMPLETED | OUTPATIENT
Start: 2019-10-17 | End: 2019-10-17

## 2019-10-17 RX ORDER — ALBUTEROL SULFATE 0.83 MG/ML
2.5 SOLUTION RESPIRATORY (INHALATION) EVERY 4 HOURS PRN
Status: DISCONTINUED | OUTPATIENT
Start: 2019-10-17 | End: 2019-10-17 | Stop reason: HOSPADM

## 2019-10-17 RX ORDER — KETOROLAC TROMETHAMINE 30 MG/ML
15 INJECTION, SOLUTION INTRAMUSCULAR; INTRAVENOUS EVERY 8 HOURS PRN
Status: DISCONTINUED | OUTPATIENT
Start: 2019-10-17 | End: 2019-10-17 | Stop reason: HOSPADM

## 2019-10-17 RX ORDER — DIPHENHYDRAMINE HYDROCHLORIDE 50 MG/ML
25 INJECTION INTRAMUSCULAR; INTRAVENOUS EVERY 6 HOURS PRN
Status: DISCONTINUED | OUTPATIENT
Start: 2019-10-17 | End: 2019-10-17 | Stop reason: HOSPADM

## 2019-10-17 RX ORDER — MEPERIDINE HYDROCHLORIDE 25 MG/ML
12.5 INJECTION INTRAMUSCULAR; INTRAVENOUS; SUBCUTANEOUS ONCE
Status: DISCONTINUED | OUTPATIENT
Start: 2019-10-17 | End: 2019-10-17 | Stop reason: HOSPADM

## 2019-10-17 RX ORDER — LIDOCAINE HYDROCHLORIDE 10 MG/ML
1 INJECTION, SOLUTION EPIDURAL; INFILTRATION; INTRACAUDAL; PERINEURAL ONCE
Status: DISCONTINUED | OUTPATIENT
Start: 2019-10-17 | End: 2023-06-09

## 2019-10-17 RX ORDER — ROPIVACAINE HYDROCHLORIDE 5 MG/ML
INJECTION, SOLUTION EPIDURAL; INFILTRATION; PERINEURAL
Status: COMPLETED | OUTPATIENT
Start: 2019-10-17 | End: 2019-10-17

## 2019-10-17 RX ORDER — SODIUM CHLORIDE, SODIUM LACTATE, POTASSIUM CHLORIDE, CALCIUM CHLORIDE 600; 310; 30; 20 MG/100ML; MG/100ML; MG/100ML; MG/100ML
INJECTION, SOLUTION INTRAVENOUS
Status: DISCONTINUED | OUTPATIENT
Start: 2019-10-17 | End: 2019-10-17 | Stop reason: HOSPADM

## 2019-10-17 RX ORDER — LIDOCAINE HYDROCHLORIDE 20 MG/ML
INJECTION, SOLUTION INFILTRATION; PERINEURAL
Status: DISCONTINUED
Start: 2019-10-17 | End: 2019-10-17 | Stop reason: WASHOUT

## 2019-10-17 RX ORDER — CHLORHEXIDINE GLUCONATE ORAL RINSE 1.2 MG/ML
10 SOLUTION DENTAL
Status: CANCELLED | OUTPATIENT
Start: 2019-10-17

## 2019-10-17 RX ORDER — CHLORHEXIDINE GLUCONATE ORAL RINSE 1.2 MG/ML
10 SOLUTION DENTAL 2 TIMES DAILY
Status: DISCONTINUED | OUTPATIENT
Start: 2019-10-17 | End: 2019-10-17 | Stop reason: HOSPADM

## 2019-10-17 RX ORDER — HYDROCODONE BITARTRATE AND ACETAMINOPHEN 5; 325 MG/1; MG/1
1 TABLET ORAL
Status: DISCONTINUED | OUTPATIENT
Start: 2019-10-17 | End: 2019-10-17 | Stop reason: HOSPADM

## 2019-10-17 RX ORDER — TRIAMCINOLONE ACETONIDE 40 MG/ML
INJECTION, SUSPENSION INTRA-ARTICULAR; INTRAMUSCULAR
Status: DISCONTINUED
Start: 2019-10-17 | End: 2019-10-17 | Stop reason: HOSPADM

## 2019-10-17 RX ORDER — MIDAZOLAM HYDROCHLORIDE 1 MG/ML
INJECTION, SOLUTION INTRAMUSCULAR; INTRAVENOUS
Status: DISCONTINUED | OUTPATIENT
Start: 2019-10-17 | End: 2019-10-17

## 2019-10-17 RX ORDER — ONDANSETRON 2 MG/ML
4 INJECTION INTRAMUSCULAR; INTRAVENOUS ONCE AS NEEDED
Status: DISCONTINUED | OUTPATIENT
Start: 2019-10-17 | End: 2019-10-17 | Stop reason: HOSPADM

## 2019-10-17 RX ORDER — HYDROCODONE BITARTRATE AND ACETAMINOPHEN 5; 325 MG/1; MG/1
1 TABLET ORAL EVERY 6 HOURS PRN
Qty: 15 TABLET | Refills: 0 | Status: SHIPPED | OUTPATIENT
Start: 2019-10-17 | End: 2020-10-23

## 2019-10-17 RX ORDER — PROPOFOL 10 MG/ML
VIAL (ML) INTRAVENOUS
Status: DISCONTINUED | OUTPATIENT
Start: 2019-10-17 | End: 2019-10-17

## 2019-10-17 RX ORDER — CEFAZOLIN SODIUM 2 G/50ML
SOLUTION INTRAVENOUS
Status: DISCONTINUED
Start: 2019-10-17 | End: 2019-10-17 | Stop reason: HOSPADM

## 2019-10-17 RX ADMIN — PROPOFOL 50 MG: 10 INJECTION, EMULSION INTRAVENOUS at 08:10

## 2019-10-17 RX ADMIN — DEXTROSE 2 G: 50 INJECTION, SOLUTION INTRAVENOUS at 08:10

## 2019-10-17 RX ADMIN — FENTANYL CITRATE 50 MCG: 50 INJECTION, SOLUTION INTRAMUSCULAR; INTRAVENOUS at 08:10

## 2019-10-17 RX ADMIN — SODIUM CHLORIDE, SODIUM LACTATE, POTASSIUM CHLORIDE, AND CALCIUM CHLORIDE: 600; 310; 30; 20 INJECTION, SOLUTION INTRAVENOUS at 06:10

## 2019-10-17 RX ADMIN — SODIUM CHLORIDE, SODIUM LACTATE, POTASSIUM CHLORIDE, AND CALCIUM CHLORIDE: 600; 310; 30; 20 INJECTION, SOLUTION INTRAVENOUS at 08:10

## 2019-10-17 RX ADMIN — FAMOTIDINE 20 MG: 20 TABLET, FILM COATED ORAL at 06:10

## 2019-10-17 RX ADMIN — LIDOCAINE HYDROCHLORIDE 50 MG: 20 INJECTION, SOLUTION INTRAVENOUS at 08:10

## 2019-10-17 RX ADMIN — MIDAZOLAM 2 MG: 1 INJECTION INTRAMUSCULAR; INTRAVENOUS at 07:10

## 2019-10-17 RX ADMIN — ROPIVACAINE HYDROCHLORIDE 25 ML: 5 INJECTION, SOLUTION EPIDURAL; INFILTRATION; PERINEURAL at 07:10

## 2019-10-17 RX ADMIN — ALPRAZOLAM 0.25 MG: 0.25 TABLET ORAL at 06:10

## 2019-10-17 RX ADMIN — FENTANYL CITRATE 50 MCG: 50 INJECTION, SOLUTION INTRAMUSCULAR; INTRAVENOUS at 07:10

## 2019-10-17 RX ADMIN — ACETAMINOPHEN 1000 MG: 500 TABLET ORAL at 06:10

## 2019-10-17 NOTE — TRANSFER OF CARE
"Anesthesia Transfer of Care Note    Patient: Maikol Pacheco    Procedure(s) Performed: Procedure(s) (LRB):  RELEASE, CARPAL TUNNEL (Left)    Patient location: PACU    Anesthesia Type: MAC    Transport from OR: Transported from OR on room air with adequate spontaneous ventilation    Post pain: adequate analgesia    Post assessment: no apparent anesthetic complications    Post vital signs: stable    Level of consciousness: awake, alert and oriented    Nausea/Vomiting: no nausea/vomiting    Complications: none    Transfer of care protocol was followed      Last vitals:   Visit Vitals  /89 (BP Location: Right arm, Patient Position: Lying)   Pulse 86   Temp 37.3 °C (99.1 °F) (Oral)   Resp 19   Ht 5' 2" (1.575 m)   Wt 113.8 kg (250 lb 14.1 oz)   LMP 10/11/2019 (Exact Date)   SpO2 96%   Breastfeeding? No   BMI 45.89 kg/m²     "

## 2019-10-17 NOTE — ANESTHESIA POSTPROCEDURE EVALUATION
Anesthesia Post Evaluation    Patient: Maikol Pacheco    Procedure(s) Performed: Procedure(s) (LRB):  RELEASE, CARPAL TUNNEL (Left)    Final Anesthesia Type: MAC  Patient location during evaluation: PACU  Patient participation: Yes- Able to Participate  Level of consciousness: awake and alert and oriented  Post-procedure vital signs: reviewed and stable  Pain management: adequate  Airway patency: patent  PONV status at discharge: No PONV  Anesthetic complications: no      Cardiovascular status: blood pressure returned to baseline, stable and hemodynamically stable  Respiratory status: unassisted  Hydration status: euvolemic  Follow-up not needed.          Vitals Value Taken Time   /89 10/17/2019  9:49 AM   Temp 36.5 °C (97.7 °F) 10/17/2019  9:01 AM   Pulse 91 10/17/2019  9:51 AM   Resp 20 10/17/2019  9:51 AM   SpO2 98 % 10/17/2019  9:51 AM   Vitals shown include unvalidated device data.      Event Time     Out of Recovery 09:47:00          Pain/Fady Score: Pain Rating Prior to Med Admin: 0 (10/17/2019  6:03 AM)  Fady Score: 9 (10/17/2019  9:45 AM)

## 2019-10-17 NOTE — ANESTHESIA PROCEDURE NOTES
Peripheral Block    Patient location during procedure: pre-op   Block not for primary anesthetic.  Reason for block: at surgeon's request and post-op pain management   Post-op Pain Location: Lrft wrist  Timeout: 10/17/2019 7:38 AM   End time: 10/17/2019 7:50 AM    Staffing  Authorizing Provider: Laly Millard MD  Performing Provider: Laly Millard MD    Preanesthetic Checklist  Completed: patient identified, site marked, surgical consent, pre-op evaluation, timeout performed, IV checked, risks and benefits discussed and monitors and equipment checked  Peripheral Block  Patient position: supine  Prep: ChloraPrep  Patient monitoring: heart rate, cardiac monitor, continuous pulse ox, continuous capnometry and frequent blood pressure checks  Block type: axillary  Laterality: left  Injection technique: single shot  Needle  Needle type: Stimuplex   Needle gauge: 21 G  Needle length: 4 in  Needle localization: anatomical landmarks, ultrasound guidance and nerve stimulator   -ultrasound image captured on disc.  Assessment  Injection assessment: negative aspiration, negative parasthesia and local visualized surrounding nerve  Paresthesia pain: none  Heart rate change: no  Slow fractionated injection: yes  Additional Notes  VSS.  [facility] RN monitoring vitals throughout procedure.  Patient tolerated procedure well.

## 2019-10-17 NOTE — DISCHARGE SUMMARY
[facility]  Discharge Note  Short Stay    OUTCOME: Patient tolerated treatment/procedure well without complication and is now ready for discharge.    DISPOSITION: Home or Self Care    FINAL DIAGNOSIS:  Left carpal tunnel syndrome    FOLLOWUP: In orthopedic clinic

## 2019-10-17 NOTE — PLAN OF CARE
Nerve block procedure completed, Dr. Millard at bedside with Taty CHRIS RN assisted at bedside. Patient tolerated well and completed, continuous cardiac monitoring following procedure. Patient denies any complaints at this time.

## 2019-10-17 NOTE — OP NOTE
[facility]  Orthopedic Surgery  Operative Note    SUMMARY     Date of Procedure: 10/17/2019   Assistant: None    Procedure: Procedure(s) (LRB):  RELEASE, CARPAL TUNNEL (Left)       Surgeon(s) and Role:     * Francisco Aguilar MD - Primary    Assisting Surgeon: None    Pre-Operative Diagnosis: Carpal tunnel syndrome on left [G56.02]    Post-Operative Diagnosis: Post-Op Diagnosis Codes:     * Carpal tunnel syndrome on left [G56.02]    Anesthesia:  Local with sedation    Technical Procedures Used:  left carpal tunnel release    Description of the Findings of the Procedure:  The patient taken the operating room where 10 cc of 2% plain lidocaine use local anesthetic about the volar aspect of the left wrist. Satisfactory anesthesia had been achieved the left hand was prepped and draped usual sterile fashion.  After exsanguination of the extremity a proximal tourniquet was inflated to 250 mm of mercury after the patient received 2 g of Ancef intravenously preoperatively.  At this time a longitudinal incision was made in line with 4th ray over the transverse carpal ligament.  The incision extended using blunt sharp dissection using 3.5 loupe magnification.  The transverse carpal ligament identified and sharply incised under direct vision.  Complete release was performed and verified by the surgeon small finger both proximally distally. No additional pathology was encountered satisfactory release had been achieved a cc of Kenalog split topically skin was closed using interrupted 4-0 nylon suture.      Complications: No    Estimated Blood Loss (EBL): 5cc                        Condition: Good    Disposition: PACU - hemodynamically stable.    Attestation: I was present and scrubbed for the entire procedure.

## 2019-10-21 ENCOUNTER — TELEPHONE (OUTPATIENT)
Dept: ORTHOPEDICS | Facility: CLINIC | Age: 39
End: 2019-10-21

## 2019-10-21 NOTE — TELEPHONE ENCOUNTER
Called the patient in regards to their message below. Informed the patient that their paperwork has been completed and we are waiting on Dr. Aguilar to sign it when he returns to the office on tomorrow. Patient asked if we could fax their op note with their paperwork on tomorrow. Patient states that they will  their copy on tomorrow at their appointment. Patient verbalized understanding. FP              ----- Message from Wade Baeza sent at 10/21/2019  2:13 PM CDT -----  Contact: pt   Please give pt a call at  [phone] regarding a update on her claim paper work.

## 2019-10-22 ENCOUNTER — OFFICE VISIT (OUTPATIENT)
Dept: ORTHOPEDICS | Facility: CLINIC | Age: 39
End: 2019-10-22
Payer: COMMERCIAL

## 2019-10-22 VITALS
OXYGEN SATURATION: 98 % | BODY MASS INDEX: 46.17 KG/M2 | RESPIRATION RATE: 16 BRPM | DIASTOLIC BLOOD PRESSURE: 89 MMHG | HEART RATE: 81 BPM | SYSTOLIC BLOOD PRESSURE: 141 MMHG | HEIGHT: 62 IN | WEIGHT: 250.88 LBS | TEMPERATURE: 97 F

## 2019-10-22 VITALS
HEIGHT: 62 IN | DIASTOLIC BLOOD PRESSURE: 87 MMHG | SYSTOLIC BLOOD PRESSURE: 121 MMHG | WEIGHT: 250.88 LBS | HEART RATE: 96 BPM | BODY MASS INDEX: 46.17 KG/M2

## 2019-10-22 DIAGNOSIS — G56.03 CARPAL TUNNEL SYNDROME, BILATERAL UPPER LIMBS: Primary | ICD-10-CM

## 2019-10-22 PROCEDURE — 99024 PR POST-OP FOLLOW-UP VISIT: ICD-10-PCS | Mod: S$GLB,,, | Performed by: ORTHOPAEDIC SURGERY

## 2019-10-22 PROCEDURE — 99024 POSTOP FOLLOW-UP VISIT: CPT | Mod: S$GLB,,, | Performed by: ORTHOPAEDIC SURGERY

## 2019-10-22 PROCEDURE — 99999 PR PBB SHADOW E&M-EST. PATIENT-LVL III: CPT | Mod: PBBFAC,,, | Performed by: ORTHOPAEDIC SURGERY

## 2019-10-22 PROCEDURE — 99999 PR PBB SHADOW E&M-EST. PATIENT-LVL III: ICD-10-PCS | Mod: PBBFAC,,, | Performed by: ORTHOPAEDIC SURGERY

## 2019-10-22 RX ORDER — INSULIN LISPRO 100 [IU]/ML
INJECTION, SOLUTION INTRAVENOUS; SUBCUTANEOUS
Qty: 15 ML | Refills: 11 | Status: SHIPPED | OUTPATIENT
Start: 2019-10-22 | End: 2019-11-07 | Stop reason: CLARIF

## 2019-10-22 NOTE — PROGRESS NOTES
Subjective:     Patient ID: Maikol Pacheco is a 39 y.o. female.    Chief Complaint: Post-op Evaluation and Pain of the Left Hand    The patient is a 39-year-old female status post left carpal tunnel release date of surgery is 11/17/2019.      Past Medical History:   Diagnosis Date    Anemia     Diabetes mellitus type II 2008    BS- 400's last week    Gastroparesis     Hyperlipidemia     Hypertension     Morbid obesity      Past Surgical History:   Procedure Laterality Date    CARPAL TUNNEL RELEASE Left 10/17/2019    Procedure: RELEASE, CARPAL TUNNEL;  Surgeon: Francisco Aguilar MD;  Location: [facility];  Service: Orthopedics;  Laterality: Left;    gastric balloon      placement and removal via endoscopy     TONSILLECTOMY       Family History   Problem Relation Age of Onset    Hyperlipidemia Mother     Diabetes Maternal Grandfather     Cancer Maternal Aunt         breast    Diabetes Maternal Grandmother     Stroke Maternal Grandmother     Hypertension Other      Social History     Socioeconomic History    Marital status: Single     Spouse name: Not on file    Number of children: 0    Years of education: Not on file    Highest education level: Not on file   Occupational History     Employer: [employer]   Social Needs    Financial resource strain: Not on file    Food insecurity:     Worry: Not on file     Inability: Not on file    Transportation needs:     Medical: Not on file     Non-medical: Not on file   Tobacco Use    Smoking status: Former Smoker     Years: 20.00    Smokeless tobacco: Never Used   Substance and Sexual Activity    Alcohol use: Yes     Frequency: Monthly or less     Comment: socially    Drug use: No    Sexual activity: Yes     Partners: Male     Birth control/protection: Condom   Lifestyle    Physical activity:     Days per week: Not on file     Minutes per session: Not on file    Stress: Not on file   Relationships    Social connections:     Talks on phone: Not  on file     Gets together: Not on file     Attends Rastafarian service: Not on file     Active member of club or organization: Not on file     Attends meetings of clubs or organizations: Not on file     Relationship status: Not on file   Other Topics Concern    Not on file   Social History Narrative    Single. Lives with mom. Has no children. 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      Medication List with Changes/Refills   Current Medications    BLOOD SUGAR DIAGNOSTIC STRP    1 strip by Misc.(Non-Drug; Combo Route) route 2 (two) times daily. Test strips covered by insurance    BLOOD-GLUCOSE METER KIT    Blood glucose Meter kit covered by insurance. Use as instructed    BLOOD-GLUCOSE METER,CONTINUOUS (DEXCOM G6 ) MISC    Use per 's instructions    BLOOD-GLUCOSE SENSOR (DEXCOM G6 SENSOR) DAMINO    Use one per 10 days per 's instruction.    BLOOD-GLUCOSE TRANSMITTER (DEXCOM G6 TRANSMITTER) DAMION    Use per 's instructions    BUPROPION (WELLBUTRIN SR) 150 MG TBSR 12 HR TABLET    Take 1 tablet (150 mg total) by mouth 2 (two) times daily.    CYCLOBENZAPRINE (FLEXERIL) 10 MG TABLET    Take 10 mg by mouth 3 (three) times daily as needed for Muscle spasms.    DULAGLUTIDE (TRULICITY) 1.5 MG/0.5 ML PNIJ    Inject 1.5 mg into the skin every 7 days.    ETODOLAC (LODINE) 400 MG TABLET        FERROUS SULFATE 325 MG (65 MG IRON) TAB TABLET    Take 325 mg by mouth once daily.     FLASH GLUCOSE SCANNING READER (FREESTYLE VASILE 14 DAY READER) MISC    1 each by Misc.(Non-Drug; Combo Route) route once daily.    FLASH GLUCOSE SENSOR (FREESTYLE VASILE 14 DAY SENSOR) KIT    1 each by Misc.(Non-Drug; Combo Route) route every 14 (fourteen) days.    HYDROCODONE-ACETAMINOPHEN (NORCO) 5-325 MG PER TABLET    Take 1 tablet by mouth every 6 (six) hours as needed for Pain.    INSULIN ASPART U-100 (NOVOLOG FLEXPEN U-100 INSULIN) 100 UNIT/ML (3 ML) INPN PEN    TITRATE UP TO 26 UNITS SUBCUE 10-15 MIN  "BEFORE MEALS 3 TIMES A DAY AS DIRECTED    INSULIN DETEMIR U-100 (LEVEMIR FLEXTOUCH U-100 INSULN) 100 UNIT/ML (3 ML) SUBQ INPN PEN    INJECT 20 UNITS INTO THE SKIN 2 (TWO) TIMES DAILY.    INSULIN LISPRO (HUMALOG KWIKPEN INSULIN) 100 UNIT/ML PEN    TITRATE UP TO 26 UNITS SUBCUE 10-15 MIN BEFORE MEALS 3 TIMES A DAY AS DIRECTED    LANCETS 33 GAUGE MISC    1 lancet by Misc.(Non-Drug; Combo Route) route 2 (two) times daily. Covered by insurance    NAPROXEN (NAPROSYN) 500 MG TABLET        PEN NEEDLE, DIABETIC (BD ULTRA-FINE MINI PEN NEEDLE) 31 GAUGE X 3/16" NDLE    AS DIRECTED    PHENTERMINE 37.5 MG CAPSULE    Take 37.5 mg by mouth every morning. Hold 14 days prior to surgery    PIOGLITAZONE (ACTOS) 15 MG TABLET    Take 1 tablet (15 mg total) by mouth once daily.    RAMIPRIL (ALTACE) 5 MG CAPSULE    Take 1 capsule (5 mg total) by mouth once daily.    SIMVASTATIN (ZOCOR) 40 MG TABLET    Take 1 tablet (40 mg total) by mouth every evening.     Review of patient's allergies indicates:   Allergen Reactions    Clindamycin Hives, Itching and Rash     Review of Systems   Constitution: Negative for malaise/fatigue.   HENT: Negative for hearing loss.    Eyes: Negative for double vision and visual disturbance.   Cardiovascular: Negative for chest pain.   Respiratory: Negative for shortness of breath.    Endocrine: Negative for cold intolerance.   Hematologic/Lymphatic: Does not bruise/bleed easily.   Skin: Negative for poor wound healing and suspicious lesions.   Musculoskeletal: Negative for gout, joint pain and joint swelling.   Gastrointestinal: Positive for constipation. Negative for nausea and vomiting.   Genitourinary: Negative for dysuria.   Neurological: Positive for numbness, paresthesias and sensory change.   Psychiatric/Behavioral: Negative for depression, memory loss and substance abuse. The patient is not nervous/anxious.    Allergic/Immunologic: Negative for persistent infections.       Objective:   Body mass index is " 45.89 kg/m².  Vitals:    10/22/19 1051   BP: 121/87   Pulse: 96                General    Constitutional: She is oriented to person, place, and time. She appears well-developed and well-nourished. No distress.   HENT:   Head: Normocephalic.   Eyes: EOM are normal.   Neck: Normal range of motion.   Pulmonary/Chest: Effort normal.   Neurological: She is oriented to person, place, and time.   Psychiatric: She has a normal mood and affect.         Left Hand/Wrist Exam     Inspection   Scars: Hand -  present  Effusion: Hand -  absent    Pain   Wrist - The patient exhibits pain of the flexor/pronator group.    Tenderness   The patient is tender to palpation of the molina area.     Other     Sensory Exam  Median Distribution: normal  Ulnar Distribution: normal  Radial Distribution: normal    Comments:  The patient has suture line intact carpal tunnel left hand.  There is no sign of infection.  There are no motor or sensory deficits.          Vascular Exam       Capillary Refill  Left Hand: normal capillary refill      Relevant imaging results reviewed and interpreted by me, discussed with the patient and / or family today no new radiographs were obtained today  Assessment:     Encounter Diagnosis   Name Primary?    Carpal tunnel syndrome, bilateral upper limbs Yes        Plan:     The patient had the left carpal tunnel redressed.  Wound care was discussed. She was given a wrist splint.  She will return in 1 week for suture removal.                Disclaimer: This note was prepared using a voice recognition system and is likely to have sound alike errors within the text.

## 2019-11-01 ENCOUNTER — OFFICE VISIT (OUTPATIENT)
Dept: ORTHOPEDICS | Facility: CLINIC | Age: 39
End: 2019-11-01
Payer: COMMERCIAL

## 2019-11-01 VITALS
WEIGHT: 250 LBS | HEIGHT: 62 IN | SYSTOLIC BLOOD PRESSURE: 123 MMHG | DIASTOLIC BLOOD PRESSURE: 86 MMHG | BODY MASS INDEX: 46.01 KG/M2 | HEART RATE: 92 BPM

## 2019-11-01 DIAGNOSIS — G56.03 CARPAL TUNNEL SYNDROME, BILATERAL UPPER LIMBS: Primary | ICD-10-CM

## 2019-11-01 PROCEDURE — 99024 PR POST-OP FOLLOW-UP VISIT: ICD-10-PCS | Mod: S$GLB,,, | Performed by: ORTHOPAEDIC SURGERY

## 2019-11-01 PROCEDURE — 99024 POSTOP FOLLOW-UP VISIT: CPT | Mod: S$GLB,,, | Performed by: ORTHOPAEDIC SURGERY

## 2019-11-01 PROCEDURE — 99999 PR PBB SHADOW E&M-EST. PATIENT-LVL III: ICD-10-PCS | Mod: PBBFAC,,, | Performed by: ORTHOPAEDIC SURGERY

## 2019-11-01 PROCEDURE — 99999 PR PBB SHADOW E&M-EST. PATIENT-LVL III: CPT | Mod: PBBFAC,,, | Performed by: ORTHOPAEDIC SURGERY

## 2019-11-01 NOTE — PROGRESS NOTES
Subjective:     Patient ID: Maikol Pacheco is a 39 y.o. female.    Chief Complaint: Post-op Evaluation and Pain of the Left Hand    The patient is a 39-year-old female seen in follow-up after left carpal tunnel release date of surgery was 10/17/2019.  She seems to be doing well.      Past Medical History:   Diagnosis Date    Anemia     Diabetes mellitus type II 2008    BS- 400's last week    Gastroparesis     Hyperlipidemia     Hypertension     Morbid obesity      Past Surgical History:   Procedure Laterality Date    CARPAL TUNNEL RELEASE Left 10/17/2019    Procedure: RELEASE, CARPAL TUNNEL;  Surgeon: Francisco Aguilar MD;  Location: [facility];  Service: Orthopedics;  Laterality: Left;    gastric balloon      placement and removal via endoscopy     TONSILLECTOMY       Family History   Problem Relation Age of Onset    Hyperlipidemia Mother     Diabetes Maternal Grandfather     Cancer Maternal Aunt         breast    Diabetes Maternal Grandmother     Stroke Maternal Grandmother     Hypertension Other      Social History     Socioeconomic History    Marital status: Single     Spouse name: Not on file    Number of children: 0    Years of education: Not on file    Highest education level: Not on file   Occupational History     Employer: [employer]   Social Needs    Financial resource strain: Not on file    Food insecurity:     Worry: Not on file     Inability: Not on file    Transportation needs:     Medical: Not on file     Non-medical: Not on file   Tobacco Use    Smoking status: Former Smoker     Years: 20.00    Smokeless tobacco: Never Used   Substance and Sexual Activity    Alcohol use: Yes     Frequency: Monthly or less     Comment: socially    Drug use: No    Sexual activity: Yes     Partners: Male     Birth control/protection: Condom   Lifestyle    Physical activity:     Days per week: Not on file     Minutes per session: Not on file    Stress: Not on file   Relationships     Social connections:     Talks on phone: Not on file     Gets together: Not on file     Attends Gnosticism service: Not on file     Active member of club or organization: Not on file     Attends meetings of clubs or organizations: Not on file     Relationship status: Not on file   Other Topics Concern    Not on file   Social History Narrative    Single. Lives with mom. Has no children. 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      Medication List with Changes/Refills   Current Medications    BLOOD SUGAR DIAGNOSTIC STRP    1 strip by Misc.(Non-Drug; Combo Route) route 2 (two) times daily. Test strips covered by insurance    BLOOD-GLUCOSE METER KIT    Blood glucose Meter kit covered by insurance. Use as instructed    BLOOD-GLUCOSE METER,CONTINUOUS (DEXCOM G6 ) MISC    Use per 's instructions    BLOOD-GLUCOSE SENSOR (DEXCOM G6 SENSOR) DAMION    Use one per 10 days per 's instruction.    BLOOD-GLUCOSE TRANSMITTER (DEXCOM G6 TRANSMITTER) DAMION    Use per 's instructions    BUPROPION (WELLBUTRIN SR) 150 MG TBSR 12 HR TABLET    Take 1 tablet (150 mg total) by mouth 2 (two) times daily.    CYCLOBENZAPRINE (FLEXERIL) 10 MG TABLET    Take 10 mg by mouth 3 (three) times daily as needed for Muscle spasms.    DULAGLUTIDE (TRULICITY) 1.5 MG/0.5 ML PNIJ    Inject 1.5 mg into the skin every 7 days.    ETODOLAC (LODINE) 400 MG TABLET        FERROUS SULFATE 325 MG (65 MG IRON) TAB TABLET    Take 325 mg by mouth once daily.     FLASH GLUCOSE SCANNING READER (FREESTYLE VASILE 14 DAY READER) MISC    1 each by Misc.(Non-Drug; Combo Route) route once daily.    FLASH GLUCOSE SENSOR (FREESTYLE VASILE 14 DAY SENSOR) KIT    1 each by Misc.(Non-Drug; Combo Route) route every 14 (fourteen) days.    HYDROCODONE-ACETAMINOPHEN (NORCO) 5-325 MG PER TABLET    Take 1 tablet by mouth every 6 (six) hours as needed for Pain.    INSULIN ASPART U-100 (NOVOLOG FLEXPEN U-100 INSULIN) 100 UNIT/ML (3 ML) INPN PEN  "   TITRATE UP TO 26 UNITS SUBCUE 10-15 MIN BEFORE MEALS 3 TIMES A DAY AS DIRECTED    INSULIN DETEMIR U-100 (LEVEMIR FLEXTOUCH U-100 INSULN) 100 UNIT/ML (3 ML) SUBQ INPN PEN    INJECT 20 UNITS INTO THE SKIN 2 (TWO) TIMES DAILY.    INSULIN LISPRO (HUMALOG KWIKPEN INSULIN) 100 UNIT/ML PEN    TITRATE UP TO 26 UNITS SUBCUE 10-15 MIN BEFORE MEALS 3 TIMES A DAY AS DIRECTED    LANCETS 33 GAUGE MISC    1 lancet by Misc.(Non-Drug; Combo Route) route 2 (two) times daily. Covered by insurance    NAPROXEN (NAPROSYN) 500 MG TABLET        PEN NEEDLE, DIABETIC (BD ULTRA-FINE MINI PEN NEEDLE) 31 GAUGE X 3/16" NDLE    AS DIRECTED    PHENTERMINE 37.5 MG CAPSULE    Take 37.5 mg by mouth every morning. Hold 14 days prior to surgery    PIOGLITAZONE (ACTOS) 15 MG TABLET    Take 1 tablet (15 mg total) by mouth once daily.    RAMIPRIL (ALTACE) 5 MG CAPSULE    Take 1 capsule (5 mg total) by mouth once daily.    SIMVASTATIN (ZOCOR) 40 MG TABLET    Take 1 tablet (40 mg total) by mouth every evening.     Review of patient's allergies indicates:   Allergen Reactions    Clindamycin Hives, Itching and Rash     Review of Systems   Constitution: Negative for malaise/fatigue.   HENT: Negative for hearing loss.    Eyes: Negative for double vision and visual disturbance.   Cardiovascular: Negative for chest pain.   Respiratory: Negative for shortness of breath.    Endocrine: Negative for cold intolerance.   Hematologic/Lymphatic: Does not bruise/bleed easily.   Skin: Negative for poor wound healing and suspicious lesions.   Musculoskeletal: Negative for gout, joint pain and joint swelling.   Gastrointestinal: Positive for constipation. Negative for nausea and vomiting.   Genitourinary: Negative for dysuria.   Neurological: Positive for numbness, paresthesias and sensory change.   Psychiatric/Behavioral: Negative for depression, memory loss and substance abuse. The patient is not nervous/anxious.    Allergic/Immunologic: Negative for persistent " infections.       Objective:   Body mass index is 45.73 kg/m².  Vitals:    11/01/19 0905   BP: 123/86   Pulse: 92                General    Constitutional: She is oriented to person, place, and time. She appears well-developed and well-nourished. No distress.   HENT:   Head: Normocephalic.   Eyes: EOM are normal.   Neck: Normal range of motion.   Pulmonary/Chest: Effort normal.   Neurological: She is oriented to person, place, and time.   Psychiatric: She has a normal mood and affect.         Left Hand/Wrist Exam     Inspection   Scars: Wrist - present   Effusion: Wrist - absent     Pain   Wrist - The patient exhibits pain of the flexor/pronator group.    Other     Sensory Exam  Median Distribution: normal  Ulnar Distribution: normal  Radial Distribution: normal    Comments:  The left carpal tunnel incision looks good.  The suture lines intact.  There is no sign of infection.  There are no motor or sensory deficits.          Vascular Exam       Capillary Refill  Left Hand: normal capillary refill      Relevant imaging results reviewed and interpreted by me, discussed with the patient and / or family today radiographs were not obtained today  Assessment:     Encounter Diagnosis   Name Primary?    Carpal tunnel syndrome, bilateral upper limbs Yes      Status post left carpal tunnel release  Plan:         The patient had the sutures removed left wrist today.  There is no sign of infection.  There are no motor or sensory deficits.  Wound care was discussed.  She will return in 6 weeks for re-evaluation.            Disclaimer: This note was prepared using a voice recognition system and is likely to have sound alike errors within the text.

## 2019-11-07 RX ORDER — INSULIN ASPART 100 [IU]/ML
INJECTION, SOLUTION INTRAVENOUS; SUBCUTANEOUS
Qty: 15 SYRINGE | Refills: 0 | Status: SHIPPED | OUTPATIENT
Start: 2019-11-07 | End: 2019-12-30

## 2019-11-09 ENCOUNTER — PATIENT MESSAGE (OUTPATIENT)
Dept: INTERNAL MEDICINE | Facility: CLINIC | Age: 39
End: 2019-11-09

## 2019-12-16 ENCOUNTER — PATIENT OUTREACH (OUTPATIENT)
Dept: ADMINISTRATIVE | Facility: HOSPITAL | Age: 39
End: 2019-12-16

## 2019-12-17 ENCOUNTER — PATIENT OUTREACH (OUTPATIENT)
Dept: ADMINISTRATIVE | Facility: HOSPITAL | Age: 39
End: 2019-12-17

## 2019-12-30 ENCOUNTER — PATIENT OUTREACH (OUTPATIENT)
Dept: ADMINISTRATIVE | Facility: HOSPITAL | Age: 39
End: 2019-12-30

## 2019-12-30 RX ORDER — INSULIN ASPART 100 [IU]/ML
INJECTION, SOLUTION INTRAVENOUS; SUBCUTANEOUS
Qty: 15 SYRINGE | Refills: 0 | Status: SHIPPED | OUTPATIENT
Start: 2019-12-30 | End: 2020-03-26 | Stop reason: SDUPTHER

## 2019-12-31 ENCOUNTER — TELEPHONE (OUTPATIENT)
Dept: DIABETES | Facility: CLINIC | Age: 39
End: 2019-12-31

## 2019-12-31 NOTE — TELEPHONE ENCOUNTER
----- Message from Pyiush Posada PA-C sent at 12/30/2019  7:37 PM CST -----  Patient needs appt in Jan. Thank you

## 2020-01-31 ENCOUNTER — TELEPHONE (OUTPATIENT)
Dept: PULMONOLOGY | Facility: CLINIC | Age: 40
End: 2020-01-31

## 2020-02-04 ENCOUNTER — TELEPHONE (OUTPATIENT)
Dept: PULMONOLOGY | Facility: CLINIC | Age: 40
End: 2020-02-04

## 2020-02-05 ENCOUNTER — PATIENT MESSAGE (OUTPATIENT)
Dept: DIABETES | Facility: CLINIC | Age: 40
End: 2020-02-05

## 2020-02-05 DIAGNOSIS — I10 HTN (HYPERTENSION): ICD-10-CM

## 2020-02-05 RX ORDER — RAMIPRIL 5 MG/1
5 CAPSULE ORAL DAILY
Qty: 90 CAPSULE | Refills: 1 | Status: SHIPPED | OUTPATIENT
Start: 2020-02-05 | End: 2020-05-15 | Stop reason: SDUPTHER

## 2020-02-06 ENCOUNTER — PATIENT MESSAGE (OUTPATIENT)
Dept: DIABETES | Facility: CLINIC | Age: 40
End: 2020-02-06

## 2020-02-06 ENCOUNTER — TELEPHONE (OUTPATIENT)
Dept: DIABETES | Facility: CLINIC | Age: 40
End: 2020-02-06

## 2020-02-06 RX ORDER — INSULIN ASPART 100 [IU]/ML
INJECTION, SOLUTION INTRAVENOUS; SUBCUTANEOUS
Qty: 15 SYRINGE | Refills: 0 | OUTPATIENT
Start: 2020-02-06

## 2020-03-03 DIAGNOSIS — E11.42 DIABETIC POLYNEUROPATHY ASSOCIATED WITH TYPE 2 DIABETES MELLITUS: Primary | ICD-10-CM

## 2020-03-04 ENCOUNTER — PATIENT OUTREACH (OUTPATIENT)
Dept: ADMINISTRATIVE | Facility: OTHER | Age: 40
End: 2020-03-04

## 2020-03-12 ENCOUNTER — HOSPITAL ENCOUNTER (OUTPATIENT)
Dept: RADIOLOGY | Facility: HOSPITAL | Age: 40
Discharge: HOME | End: 2020-03-12
Attending: FAMILY MEDICINE
Payer: COMMERCIAL

## 2020-03-12 DIAGNOSIS — Z12.39 BREAST SCREENING: ICD-10-CM

## 2020-03-12 PROCEDURE — 77063 BREAST TOMOSYNTHESIS BI: CPT | Mod: 26,,, | Performed by: RADIOLOGY

## 2020-03-12 PROCEDURE — 77067 SCR MAMMO BI INCL CAD: CPT | Mod: 26,,, | Performed by: RADIOLOGY

## 2020-03-12 PROCEDURE — 77067 SCR MAMMO BI INCL CAD: CPT | Mod: TC

## 2020-03-12 PROCEDURE — 77067 MAMMO DIGITAL SCREENING BILAT WITH TOMOSYNTHESIS_CAD: ICD-10-PCS | Mod: 26,,, | Performed by: RADIOLOGY

## 2020-03-12 PROCEDURE — 77063 MAMMO DIGITAL SCREENING BILAT WITH TOMOSYNTHESIS_CAD: ICD-10-PCS | Mod: 26,,, | Performed by: RADIOLOGY

## 2020-03-15 ENCOUNTER — PATIENT MESSAGE (OUTPATIENT)
Dept: INTERNAL MEDICINE | Facility: CLINIC | Age: 40
End: 2020-03-15

## 2020-03-25 ENCOUNTER — PATIENT MESSAGE (OUTPATIENT)
Dept: INTERNAL MEDICINE | Facility: CLINIC | Age: 40
End: 2020-03-25

## 2020-03-26 RX ORDER — INSULIN ASPART 100 [IU]/ML
INJECTION, SOLUTION INTRAVENOUS; SUBCUTANEOUS
Qty: 15 SYRINGE | Refills: 0 | Status: SHIPPED | OUTPATIENT
Start: 2020-03-26 | End: 2020-07-17

## 2020-05-06 ENCOUNTER — PATIENT MESSAGE (OUTPATIENT)
Dept: DIABETES | Facility: CLINIC | Age: 40
End: 2020-05-06

## 2020-05-08 ENCOUNTER — PATIENT MESSAGE (OUTPATIENT)
Dept: DIABETES | Facility: CLINIC | Age: 40
End: 2020-05-08

## 2020-05-13 ENCOUNTER — PATIENT OUTREACH (OUTPATIENT)
Dept: ADMINISTRATIVE | Facility: OTHER | Age: 40
End: 2020-05-13

## 2020-05-13 NOTE — PROGRESS NOTES
PCP: Maisha Bartholomew MD    Subjective:     Chief Complaint: Diabetes - Established Patient    TELEMEDICINE VISIT:     The patient location is: Home  The chief complaint leading to consultation is: Diabetes Follow up  Visit type: Virtual visit with synchronous audio and video  Total time spent with patient: 30  Each patient to whom he or she provides medical services by telemedicine is:  (1) informed of the relationship between the physician and patient and the respective role of any other health care provider with respect to management of the patient; and (2) notified that he or she may decline to receive medical services by telemedicine and may withdraw from such care at any time.    Notes: N/A      HISTORY OF PRESENT ILLNESS: 39 y.o.   female presenting for diabetes management visit.   Patient has previously been established with the Diabetes Management Department and was last seen by Brennan Granados PA-C on 10/14/19.   Patient is new to me and is here for establishment of future care .   Patient has had Type II diabetes since 2010.  Pertinent to decision making is the following comorbidities: HTN, HLD and Obesity by BMI  Patient has the following Diabetes complications: with diabetic polyneuropathy  She  has attended diabetes education in the past.     Patient's most recent A1c of 9.2% was completed 8 months ago.   Patient states since Her last A1c Her blood glucose levels have been Improved over the last few months. Patient attributes this to her recent improvement in diet.   Patient monitors blood glucose 2 times per day with meter : Fasting and Before Bed.   Patient blood glucose monitoring device will not be uploaded into Media Section today secondary to Telemedicine visit.   Per patient recall, fasting blood sugars ranging from 100 - 160 and before bed 100 - 160.   Patient endorses the following diabetes related symptoms: None.   Patient is due today for the following diabetes-related health  maintenance standards: Foot Exam , Eye Exam and A1c. Patient denies urgent issues with feet or vision.   She denies recent hospital admissions or emergency room visits.   She denies having hypoglycemia.   Patient's concerns today include glycemic control.   Patient medication regimen is as below.     CURRENT DM MEDICATIONS:     Levemir 24 units BID   Novolog 16 units with sliding scale TID wm    Trulicity 1.5 mg weekly   Actos 15 mg     Patient has failed the following Diabetes medications:    Metformin       Labs Reviewed.       Lab Results   Component Value Date    CPEPTIDE 2.2 12/29/2016     No results found for: GLUTAMICACID       //   , There is no height or weight on file to calculate BMI.  Her blood sugar in clinic today is:    Lab Results   Component Value Date    POCGLU 77 10/14/2019       Review of Systems   Constitutional: Negative for activity change, appetite change, chills and fever.   HENT: Negative for dental problem, mouth sores, nosebleeds, sore throat and trouble swallowing.    Eyes: Negative for pain and discharge.   Respiratory: Negative for shortness of breath, wheezing and stridor.    Cardiovascular: Negative for chest pain, palpitations and leg swelling.   Gastrointestinal: Negative for abdominal pain, diarrhea, nausea and vomiting.   Endocrine: Negative for polydipsia, polyphagia and polyuria.   Genitourinary: Negative for dysuria, frequency and urgency.   Musculoskeletal: Negative for joint swelling and myalgias.   Skin: Negative for rash and wound.   Neurological: Negative for dizziness, syncope, weakness and headaches.   Psychiatric/Behavioral: Negative for behavioral problems and dysphoric mood.         Diabetes Management Status  Statin: Taking  ACE/ARB: Taking    Screening or Prevention Patient's value Goal Complete/Controlled?   HgA1C Testing and Control   Lab Results   Component Value Date    HGBA1C 9.2 (H) 09/30/2019      Annually/Less than 8% No   Lipid profile : 05/30/2019  Annually Yes   LDL control Lab Results   Component Value Date    LDLCALC 110.2 05/30/2019    Annually/Less than 100 mg/dl  No   Nephropathy screening Lab Results   Component Value Date    MICALBCREAT 3.1 03/21/2018     Lab Results   Component Value Date    PROTEINUA Trace (A) 05/30/2019    Annually Yes   Blood pressure BP Readings from Last 1 Encounters:   11/01/19 123/86    Less than 140/90 Yes   Dilated retinal exam : 06/05/2019 Annually Yes    Foot exam   : 05/29/2019 Annually Yes     Social History     Socioeconomic History    Marital status: Single     Spouse name: Not on file    Number of children: 0    Years of education: Not on file    Highest education level: Not on file   Occupational History     Employer: [employer]   Social Needs    Financial resource strain: Not on file    Food insecurity:     Worry: Not on file     Inability: Not on file    Transportation needs:     Medical: Not on file     Non-medical: Not on file   Tobacco Use    Smoking status: Former Smoker     Years: 20.00    Smokeless tobacco: Never Used   Substance and Sexual Activity    Alcohol use: Yes     Frequency: Monthly or less     Comment: socially    Drug use: No    Sexual activity: Yes     Partners: Male     Birth control/protection: Condom   Lifestyle    Physical activity:     Days per week: Not on file     Minutes per session: Not on file    Stress: Not on file   Relationships    Social connections:     Talks on phone: Not on file     Gets together: Not on file     Attends Adventism service: Not on file     Active member of club or organization: Not on file     Attends meetings of clubs or organizations: Not on file     Relationship status: Not on file   Other Topics Concern    Not on file   Social History Narrative    Single. Lives with mom. Has no children. 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      Past Medical History:   Diagnosis Date    Anemia     Diabetes mellitus type II 2008    BS- 400's last week     Gastroparesis     Hyperlipidemia     Hypertension     Morbid obesity        Objective:        Physical Exam   Constitutional: She is oriented to person, place, and time. She appears well-developed and well-nourished. No distress.   HENT:   Head: Normocephalic and atraumatic.   Right Ear: External ear normal.   Left Ear: External ear normal.   Nose: Nose normal.   Eyes: EOM are normal. Right eye exhibits no discharge. Left eye exhibits no discharge.   Neck: Normal range of motion.   Pulmonary/Chest: Effort normal. No stridor. No respiratory distress.   Musculoskeletal: Normal range of motion.   Neurological: She is alert and oriented to person, place, and time. She exhibits normal muscle tone. Coordination normal.   Skin: She is not diaphoretic. No pallor.   Psychiatric: She has a normal mood and affect. Her behavior is normal. Judgment and thought content normal.       Physical Exam limited secondary to Telemedicine visit    Assessment / Plan:     Diabetes mellitus type 2, uncontrolled, with complications  -     Microalbumin/creatinine urine ratio; Future; Expected date: 05/14/2020  -     Lipid Panel; Future; Expected date: 05/14/2020  -     Hemoglobin A1C; Standing  -     CBC auto differential; Future; Expected date: 05/14/2020  -     Comprehensive metabolic panel; Future; Expected date: 05/14/2020  -     TSH; Future; Expected date: 05/14/2020  -     pioglitazone (ACTOS) 15 MG tablet; Take 1 tablet (15 mg total) by mouth once daily.  Dispense: 30 tablet; Refill: 11    Diabetic polyneuropathy associated with type 2 diabetes mellitus    Hypertension associated with diabetes    Hyperlipidemia associated with type 2 diabetes mellitus    Morbid obesity with BMI of 45.0-49.9, adult      Additional Plan Details:    - POCT Glucose  - Encouraged continuation of lifestyle changes including regular exercise and limiting carbohydrates to 30-45 grams per meal threes times daily and 15 grams per snack with a limit of two  daily.   - Encouraged continued monitoring of blood glucose with maintenance of 2 times daily at Fasting and Before Bed.   - Current DM Medication Regimen: Continue current regimen - will update recommendations after A1c.   - Health Maintenance standards addressed today: Foot Exam - deferred by patient today because Telemedicine visit, Eye Exam - will be completed within [facility] system and scheduled today and A1c to be scheduled today  - Fasting labs same day as Eye Appt  - Nursing Visit: Patient is age 79 or younger with an A1c of 7.5 or greater and will defer NV until after A1c.   - Follow up in 3 months with A1c prior.       Piuysh Posada PA-C  [facility]    A total of 30 minutes was spent in face to face time, of which over 50 % was spent in counseling patient on disease process, complications, treatment, and side effects of medications.

## 2020-05-14 ENCOUNTER — PATIENT MESSAGE (OUTPATIENT)
Dept: DIABETES | Facility: CLINIC | Age: 40
End: 2020-05-14

## 2020-05-14 ENCOUNTER — OFFICE VISIT (OUTPATIENT)
Dept: DIABETES | Facility: CLINIC | Age: 40
End: 2020-05-14
Payer: COMMERCIAL

## 2020-05-14 ENCOUNTER — CLINICAL SUPPORT (OUTPATIENT)
Dept: DIABETES | Facility: CLINIC | Age: 40
End: 2020-05-14
Payer: COMMERCIAL

## 2020-05-14 DIAGNOSIS — E11.42 DIABETIC POLYNEUROPATHY ASSOCIATED WITH TYPE 2 DIABETES MELLITUS: ICD-10-CM

## 2020-05-14 DIAGNOSIS — I15.2 HYPERTENSION ASSOCIATED WITH DIABETES: Chronic | ICD-10-CM

## 2020-05-14 DIAGNOSIS — E66.01 MORBID OBESITY WITH BMI OF 45.0-49.9, ADULT: ICD-10-CM

## 2020-05-14 DIAGNOSIS — E11.69 HYPERLIPIDEMIA ASSOCIATED WITH TYPE 2 DIABETES MELLITUS: Chronic | ICD-10-CM

## 2020-05-14 DIAGNOSIS — E11.59 HYPERTENSION ASSOCIATED WITH DIABETES: Chronic | ICD-10-CM

## 2020-05-14 DIAGNOSIS — E78.5 HYPERLIPIDEMIA ASSOCIATED WITH TYPE 2 DIABETES MELLITUS: Chronic | ICD-10-CM

## 2020-05-14 PROCEDURE — G0108 DIAB MANAGE TRN  PER INDIV: HCPCS | Mod: 95,,, | Performed by: DIETITIAN, REGISTERED

## 2020-05-14 PROCEDURE — G0108 PR DIAB MANAGE TRN  PER INDIV: ICD-10-PCS | Mod: 95,,, | Performed by: DIETITIAN, REGISTERED

## 2020-05-14 PROCEDURE — 99214 OFFICE O/P EST MOD 30 MIN: CPT | Mod: 95,,, | Performed by: PHYSICIAN ASSISTANT

## 2020-05-14 PROCEDURE — 3046F PR MOST RECENT HEMOGLOBIN A1C LEVEL > 9.0%: ICD-10-PCS | Mod: CPTII,,, | Performed by: PHYSICIAN ASSISTANT

## 2020-05-14 PROCEDURE — 3046F HEMOGLOBIN A1C LEVEL >9.0%: CPT | Mod: CPTII,,, | Performed by: PHYSICIAN ASSISTANT

## 2020-05-14 PROCEDURE — 99214 PR OFFICE/OUTPT VISIT, EST, LEVL IV, 30-39 MIN: ICD-10-PCS | Mod: 95,,, | Performed by: PHYSICIAN ASSISTANT

## 2020-05-14 RX ORDER — PIOGLITAZONEHYDROCHLORIDE 15 MG/1
15 TABLET ORAL DAILY
Qty: 30 TABLET | Refills: 11 | Status: SHIPPED | OUTPATIENT
Start: 2020-05-14 | End: 2021-03-05 | Stop reason: SDUPTHER

## 2020-05-14 NOTE — LETTER
May 14, 2020        Maisha Bartholomew MD  [address] [address] [facility] [place] [address]             The [facility]  [address] [address] [facility] [place] [address]  Phone: [phone]  Fax: [phone]   Patient: Maikol Pacheco   MR Number: [id number]   YOB: 1980   Date of Visit: 5/14/2020       Dear Dr. Bartholomew:    Thank you for referring Maikol Pacheco to me for evaluation. Below are the relevant portions of my assessment and plan of care.     If you have questions, please do not hesitate to call me. I look forward to following Maikol along with you.    Sincerely,      Jesika Weber, LA, CDE           CC  No Recipients

## 2020-05-14 NOTE — PROGRESS NOTES
Diabetes Care Specialist Virtual Visit Note   It was in the patient's best interest to receive diabetes self-management education and support services in this format to prevent the exposure to COVID-19.        The patient location is: home   The chief complaint leading to consultation is: Diabetes  Visit type: audiovisual  Total time spent with patient: 45 min   Each patient to whom he or she provides medical services by telemedicine is:  (1) informed of the relationship between the physician and patient and the respective role of any other health care provider with respect to management of the patient; and (2) notified that he or she may decline to receive medical services by telemedicine and may withdraw from such care at any time.      Diabetes Education  Author: Jesika Weber RD, CDE  Date: 5/14/2020    Diabetes Care Management Summary  Diabetes Education Record Assessment/Progress: Initial  Current Diabetes Risk Level: High     Last A1c:   Lab Results   Component Value Date    HGBA1C 9.2 (H) 09/30/2019     Last Visit with Diabetes Educator: 5/29/2019  Last OPCM Referral: 05/29/2019   Enrolled in OPCM: No    Diabetes Type  Diabetes Type : Type II    Diabetes History  Diabetes Diagnosis: 5-10 years(dx 2010)  Current Treatment: Diet, Exercise, Insulin, Oral Medication, Injectable(trulicity 1.5mg wkly, levemir 24units twice daily, novolog 18-20 units twice daily (doesn't take novolog at lunch), actos 15mg daily)  Reviewed Problem List with Patient: Yes    Health Maintenance was reviewed today with patient. Discussed with patient importance of routine eye exams, foot exams/foot care, blood work (i.e.: A1c, microalbumin, and lipid), dental visits, yearly flu vaccine, and pneumonia vaccine as indicated by PCP. Patient verbalized understanding.     Health Maintenance Topics with due status: Not Due       Topic Last Completion Date    TETANUS VACCINE 04/11/2014    Lipid Panel 05/30/2019    Low Dose Statin  11/01/2019    Mammogram 03/12/2020     Health Maintenance Due   Topic Date Due    Pap Smear with HPV Cotest  12/09/2019    Hemoglobin A1c  03/30/2020    Foot Exam  05/29/2020    Eye Exam  06/05/2020       Nutrition  Meal Planning: (Usual intake ~2500-2800cals/d w/ excess from starch portions, snacks (chips, ice cream, cake). Recently reduced ~1500cals/d & low-carb. Pt is interested in bariatric sleeve and has started reducing carbs to help with lifestyle transition. )  Meal Plan 24 Hour Recall - Breakfast: premier protein w/ oats   Meal Plan 24 Hour Recall - Lunch: spinach/zucchini noodles (steams), shrimp ([employer])  Meal Plan 24 Hour Recall - Dinner: chix stir-bruce with veggies   Meal Plan 24 Hour Recall - Snack: seeweed chips or skinny pop or rice cakes or nuts or yogurt; giuliano: water, unsweetened tea    Monitoring   Self Monitoring : Per recall, fst BG/acd 100-160. Pt denies hypo/hyperglycemic symptoms.   In the last month, how often have you had a low blood sugar reaction?: never    Exercise   Exercise Type: (None recent. Prior using gym membership ~60min 3d/wk & plans to start back once gym opens. Also has track near home. )    Current Diabetes Treatment   Current Treatment: Diet, Exercise, Insulin, Oral Medication, Injectable(trulicity 1.5mg wkly, levemir 24units twice daily, novolog 18-20 units twice daily (doesn't take novolog at lunch), actos 15mg daily)    Social History  Preferred Learning Method: Face to Face  Primary Support: Self  Smoking Status: Ex Smoker  Alcohol Use: Rarely       DDS-2 Score  ( > 3 = SIGNIFICANT DISTRESS): 4  DDS Score  ( > 3 = SIGNIFICANT DISTRESS): 2.12  Emotional Webster Score: 4  Physician-Related Distress: 1  Regimen-Related Distress: 1.8  Interpersonal Distress: 1    Barriers to Change  Barriers to Change: None  Learning Challenges : None    Readiness to Learn   Readiness to Learn : Eager    Cultural Influences  Cultural Influences: No    Diabetes Education  Assessment/Progress  Diabetes Disease Process (diabetes disease process and treatment options): Discussion, Individual Session, Demonstrates Understanding/Competency(verbalizes/demonstrates), Written Materials Provided  Nutrition (Incorporating nutritional management into one's lifestyle): Discussion, Individual Session, Demonstrates Understanding/Competency (verbalizes/demonstrates), Written Materials Provided  Physical Activity (incorporating physical activity into one's lifestyle): Discussion, Individual Session, Demonstrates Understanding/Competency (verbalizes/demonstrates), Written Materials Provided  Medications (states correct name, dose, onset, peak, duration, side effects & timing of meds): Discussion, Individual Session, Demonstrates Understanding/Competency(verbalizes/demonstrates), Written Materials Provided  Monitoring (monitoring blood glucose/other parameters & using results): Discussion, Individual Session, Demonstrates Understanding/Competency (verbalizes/demonstrates), Written Materials Provided  Acute Complications (preventing, detecting, and treating acute complications): Discussion, Individual Session, Demonstrates Understanding/Competency (verbalizes/demonstrates)  Chronic Complications (preventing, detecting, and treating chronic complications): Discussion, Individual Session, Demonstrates Understanding/Competency (verbalizes/demonstrates), Written Materials Provided  Clinical (diabetes, other pertinent medical history, and relevant comorbidities reviewed during visit): Discussion, Individual Session, Demonstrates Understanding/Competency (verbalizes/demonstrates)  Cognitive (knowledge of self-management skills, functional health literacy): Discussion, Individual Session, Demonstrates Understanding/Competency (verbalizes/demonstrates)  Psychosocial (emotional response to diabetes): Discussion, Individual Session, Demonstrates Understanding/Competency (verbalizes/demonstrates)  Diabetes  Distress and Support Systems: Discussion, Individual Session, Demonstrates Understanding/Competency (verbalizes/demonstrates)  Behavioral (readiness for change, lifestyle practices, self-care behaviors): Discussion, Individual Session, Demonstrates Understanding/Competency (verbalizes/demonstrates)    Goals  Patient has selected/evaluated goals during today's session: Yes, selected  Healthy Eating: Set(use meal plan - carb portions/spacing w/ continued bariatric transition meal plan )  Start Date: 05/14/20  Target Date: 07/09/20  Physical Activity: Set(150min - start walking track 10min+ daily )  Start Date: 05/14/20  Target Date: 07/09/20  Monitoring: Set(test BG 4x/d -fst, ac, bed; bring meter to clinic)  Start Date: 05/14/20  Target Date: 07/09/20     Diabetes Care Plan/Intervention  Education Plan/Intervention: Individual Follow-Up DSMT, Endocrine Provider Visit Set Up(Maintain fu w/ [facility] for medical mgmt. ) DSMT RTC ~2mos since pt is doing well with current meal plan; encouraged pt to fu via [employer] or phone prn. Will e-mail patient diabetes and meal planning resources and send her bariatric provider contacts.       Diabetes Meal Plan  Restrictions: Low Fat, Low Sodium, Restricted Carbohydrate  Calories: 1200  Carbohydrate Per Meal: 20-30g  Carbohydrate Per Snack : 7-15g    Today's Self-Management Care Plan was developed with the patient's input and is based on barriers identified during today's assessment.    The long and short-term goals in the care plan were written with the patient/caregiver's input. The patient has agreed to work toward these goals to improve her overall diabetes control.      The patient received a copy of today's self-management plan and verbalized understanding of the care plan, goals, and all of today's instructions.      The patient was encouraged to communicate with her physician and care team regarding her condition(s) and treatment.  I provided the patient with my contact  information today and encouraged her to contact me via phone or patient portal as needed.     Education Units of Time   Time Spent: 45 min

## 2020-05-15 DIAGNOSIS — I10 HTN (HYPERTENSION): ICD-10-CM

## 2020-05-15 DIAGNOSIS — E78.2 MIXED HYPERLIPIDEMIA: ICD-10-CM

## 2020-05-15 RX ORDER — RAMIPRIL 5 MG/1
5 CAPSULE ORAL DAILY
Qty: 90 CAPSULE | Refills: 1 | Status: SHIPPED | OUTPATIENT
Start: 2020-05-15 | End: 2020-10-23 | Stop reason: SDUPTHER

## 2020-05-15 RX ORDER — SIMVASTATIN 40 MG/1
40 TABLET, FILM COATED ORAL NIGHTLY
Qty: 90 TABLET | Refills: 1 | Status: SHIPPED | OUTPATIENT
Start: 2020-05-15 | End: 2020-10-23 | Stop reason: SDUPTHER

## 2020-06-08 ENCOUNTER — PATIENT OUTREACH (OUTPATIENT)
Dept: ADMINISTRATIVE | Facility: OTHER | Age: 40
End: 2020-06-08

## 2020-06-08 NOTE — PROGRESS NOTES
Chart reviewed.   Immunizations: Triggered Imm Registry     Orders placed: n/a  Upcoming appts to satisfy STEVE topics: n/a

## 2020-07-07 ENCOUNTER — PATIENT OUTREACH (OUTPATIENT)
Dept: ADMINISTRATIVE | Facility: OTHER | Age: 40
End: 2020-07-07

## 2020-07-07 NOTE — PROGRESS NOTES
Requested updates within Care Everywhere.  Patient's chart was reviewed for overdue STEVE topics.  Immunizations reconciled.    Orders placed:  Tasked appts:  Labs Linked:

## 2020-08-05 ENCOUNTER — PATIENT OUTREACH (OUTPATIENT)
Dept: ADMINISTRATIVE | Facility: OTHER | Age: 40
End: 2020-08-05

## 2020-08-05 NOTE — PROGRESS NOTES
Requested updates within Care Everywhere.  Patient's chart was reviewed for overdue STEVE topics.  Immunizations reconciled.    Eye exam 8/6/20.

## 2020-09-08 ENCOUNTER — PATIENT OUTREACH (OUTPATIENT)
Dept: ADMINISTRATIVE | Facility: HOSPITAL | Age: 40
End: 2020-09-08

## 2020-09-08 NOTE — PROGRESS NOTES
Working uncontrolled HGA1C report.  Attempted to call pt to schedule overdue DM labs and PCP visit. No answer. Sent portal message.

## 2020-10-06 ENCOUNTER — PATIENT MESSAGE (OUTPATIENT)
Dept: ADMINISTRATIVE | Facility: HOSPITAL | Age: 40
End: 2020-10-06

## 2020-10-15 ENCOUNTER — PATIENT MESSAGE (OUTPATIENT)
Dept: INTERNAL MEDICINE | Facility: CLINIC | Age: 40
End: 2020-10-15

## 2020-10-23 ENCOUNTER — OFFICE VISIT (OUTPATIENT)
Dept: INTERNAL MEDICINE | Facility: CLINIC | Age: 40
End: 2020-10-23
Payer: COMMERCIAL

## 2020-10-23 VITALS
TEMPERATURE: 98 F | BODY MASS INDEX: 42.9 KG/M2 | WEIGHT: 234.56 LBS | HEART RATE: 63 BPM | SYSTOLIC BLOOD PRESSURE: 130 MMHG | DIASTOLIC BLOOD PRESSURE: 80 MMHG | OXYGEN SATURATION: 95 %

## 2020-10-23 DIAGNOSIS — I15.2 HYPERTENSION ASSOCIATED WITH DIABETES: Chronic | ICD-10-CM

## 2020-10-23 DIAGNOSIS — E11.59 HYPERTENSION ASSOCIATED WITH DIABETES: Chronic | ICD-10-CM

## 2020-10-23 DIAGNOSIS — E11.69 HYPERLIPIDEMIA ASSOCIATED WITH TYPE 2 DIABETES MELLITUS: Chronic | ICD-10-CM

## 2020-10-23 DIAGNOSIS — E66.01 MORBID OBESITY WITH BMI OF 40.0-44.9, ADULT: ICD-10-CM

## 2020-10-23 DIAGNOSIS — Z00.00 ROUTINE GENERAL MEDICAL EXAMINATION AT A HEALTH CARE FACILITY: Primary | ICD-10-CM

## 2020-10-23 DIAGNOSIS — G56.01 CARPAL TUNNEL SYNDROME OF RIGHT WRIST: ICD-10-CM

## 2020-10-23 DIAGNOSIS — E78.5 HYPERLIPIDEMIA ASSOCIATED WITH TYPE 2 DIABETES MELLITUS: Chronic | ICD-10-CM

## 2020-10-23 DIAGNOSIS — F41.1 GAD (GENERALIZED ANXIETY DISORDER): ICD-10-CM

## 2020-10-23 DIAGNOSIS — Z01.89 NEED FOR ASSESSMENT FOR SLEEP APNEA: ICD-10-CM

## 2020-10-23 DIAGNOSIS — Z01.419 WELL WOMAN EXAM: ICD-10-CM

## 2020-10-23 PROBLEM — G56.02 LEFT CARPAL TUNNEL SYNDROME: Status: RESOLVED | Noted: 2019-10-17 | Resolved: 2020-10-23

## 2020-10-23 PROCEDURE — 99396 PREV VISIT EST AGE 40-64: CPT | Mod: 25,S$GLB,, | Performed by: FAMILY MEDICINE

## 2020-10-23 PROCEDURE — 3075F PR MOST RECENT SYSTOLIC BLOOD PRESS GE 130-139MM HG: ICD-10-PCS | Mod: CPTII,S$GLB,, | Performed by: FAMILY MEDICINE

## 2020-10-23 PROCEDURE — 3008F PR BODY MASS INDEX (BMI) DOCUMENTED: ICD-10-PCS | Mod: CPTII,S$GLB,, | Performed by: FAMILY MEDICINE

## 2020-10-23 PROCEDURE — 3079F PR MOST RECENT DIASTOLIC BLOOD PRESSURE 80-89 MM HG: ICD-10-PCS | Mod: CPTII,S$GLB,, | Performed by: FAMILY MEDICINE

## 2020-10-23 PROCEDURE — 99396 PR PREVENTIVE VISIT,EST,40-64: ICD-10-PCS | Mod: 25,S$GLB,, | Performed by: FAMILY MEDICINE

## 2020-10-23 PROCEDURE — 99999 PR PBB SHADOW E&M-EST. PATIENT-LVL V: CPT | Mod: PBBFAC,,, | Performed by: FAMILY MEDICINE

## 2020-10-23 PROCEDURE — 99999 PR PBB SHADOW E&M-EST. PATIENT-LVL V: ICD-10-PCS | Mod: PBBFAC,,, | Performed by: FAMILY MEDICINE

## 2020-10-23 PROCEDURE — 3079F DIAST BP 80-89 MM HG: CPT | Mod: CPTII,S$GLB,, | Performed by: FAMILY MEDICINE

## 2020-10-23 PROCEDURE — 90471 FLU VACCINE (QUAD) GREATER THAN OR EQUAL TO 3YO PRESERVATIVE FREE IM: ICD-10-PCS | Mod: S$GLB,,, | Performed by: FAMILY MEDICINE

## 2020-10-23 PROCEDURE — 3008F BODY MASS INDEX DOCD: CPT | Mod: CPTII,S$GLB,, | Performed by: FAMILY MEDICINE

## 2020-10-23 PROCEDURE — 3075F SYST BP GE 130 - 139MM HG: CPT | Mod: CPTII,S$GLB,, | Performed by: FAMILY MEDICINE

## 2020-10-23 PROCEDURE — 90686 FLU VACCINE (QUAD) GREATER THAN OR EQUAL TO 3YO PRESERVATIVE FREE IM: ICD-10-PCS | Mod: S$GLB,,, | Performed by: FAMILY MEDICINE

## 2020-10-23 PROCEDURE — 90686 IIV4 VACC NO PRSV 0.5 ML IM: CPT | Mod: S$GLB,,, | Performed by: FAMILY MEDICINE

## 2020-10-23 PROCEDURE — 90471 IMMUNIZATION ADMIN: CPT | Mod: S$GLB,,, | Performed by: FAMILY MEDICINE

## 2020-10-23 RX ORDER — RAMIPRIL 5 MG/1
5 CAPSULE ORAL DAILY
Qty: 90 CAPSULE | Refills: 1 | Status: SHIPPED | OUTPATIENT
Start: 2020-10-23 | End: 2020-11-10 | Stop reason: SDUPTHER

## 2020-10-23 RX ORDER — INSULIN DETEMIR 100 [IU]/ML
INJECTION, SOLUTION SUBCUTANEOUS
Qty: 15 ML | Refills: 11 | Status: SHIPPED | OUTPATIENT
Start: 2020-10-23 | End: 2022-08-03 | Stop reason: SDUPTHER

## 2020-10-23 RX ORDER — BUPROPION HYDROCHLORIDE 150 MG/1
150 TABLET, EXTENDED RELEASE ORAL 2 TIMES DAILY
Qty: 180 TABLET | Refills: 1 | Status: SHIPPED | OUTPATIENT
Start: 2020-10-23 | End: 2021-03-05

## 2020-10-23 RX ORDER — SIMVASTATIN 40 MG/1
40 TABLET, FILM COATED ORAL NIGHTLY
Qty: 90 TABLET | Refills: 1 | Status: SHIPPED | OUTPATIENT
Start: 2020-10-23 | End: 2021-02-17 | Stop reason: SDUPTHER

## 2020-10-23 NOTE — PROGRESS NOTES
Subjective:       Patient ID: Maikol Pacheco is a 40 y.o. female.    Chief Complaint: Annual Exam    40-year-old  female patient with Patient Active Problem List:     Hyperlipidemia associated with type 2 diabetes mellitus     Hypertension associated with diabetes     Anemia     Uncontrolled type 2 diabetes mellitus with diabetic polyneuropathy, with long-term current use of insulin     Chronic idiopathic constipation     Carpal tunnel syndrome of right wrist  Here for routine annual physicals, patient reports that she has been monitoring her blood glucose levels which has been running in the range of 120s to 150s, did not take her insulin last night, reports that she occasionally forgets to take her insulin but not regularly.  Had eye exam done last week at [facility] which was normal.   Patient reports that she continues to have minimal symptoms to the right wrist with carpal tunnel and would like to consider getting surgery done later has been doing well with left carpal tunnel release.   Has been followed by diabetes clinic  Requesting paperwork for FMLA secondary to fluctuations in blood glucose levels  Denies any dizziness, nausea vomiting tingling or numbness sensation to extremities, chest pain or palpitations    Diabetes  She has type 2 diabetes mellitus. No MedicAlert identification noted. The initial diagnosis of diabetes was made 10 years ago. Hypoglycemia symptoms include nervousness/anxiousness. Pertinent negatives for hypoglycemia include no confusion, dizziness, headaches, hunger, mood changes, pallor, seizures, sleepiness, speech difficulty, sweats or tremors. Pertinent negatives for diabetes include no blurred vision, no chest pain, no fatigue, no foot paresthesias, no foot ulcerations, no polydipsia, no polyphagia, no polyuria, no visual change, no weakness and no weight loss. Pertinent negatives for hypoglycemia complications include no blackouts, no hospitalization,  no nocturnal hypoglycemia, no required assistance and no required glucagon injection. Symptoms are worsening. Pertinent negatives for diabetic complications include no autonomic neuropathy, CVA, heart disease, impotence, nephropathy, peripheral neuropathy, PVD or retinopathy. Risk factors for coronary artery disease include obesity and stress. Current diabetic treatment includes diet and insulin injections. She is compliant with treatment all of the time. She is currently taking insulin pre-breakfast and pre-dinner. Insulin injections are given by patient. Rotation sites for injection include the abdominal wall and thighs. Her weight is fluctuating minimally. She is following a diabetic diet. Meal planning includes avoidance of concentrated sweets and carbohydrate counting. She has had a previous visit with a dietitian. She participates in exercise three times a week. She monitors blood glucose at home 3-4 x per day. Blood glucose monitoring compliance is fair. Her home blood glucose trend is fluctuating minimally. She does not see a podiatrist.Eye exam is current.     Review of Systems   Constitutional: Negative for fatigue and weight loss.   Eyes: Negative for blurred vision and visual disturbance.   Respiratory: Negative for shortness of breath.    Cardiovascular: Negative for chest pain and leg swelling.   Gastrointestinal: Negative for abdominal pain, nausea and vomiting.   Endocrine: Negative for polydipsia, polyphagia and polyuria.   Genitourinary: Negative for impotence.   Musculoskeletal: Negative for myalgias.   Skin: Negative for pallor and rash.   Neurological: Positive for numbness. Negative for dizziness, tremors, seizures, speech difficulty, weakness, light-headedness and headaches.   Psychiatric/Behavioral: Positive for sleep disturbance. Negative for confusion. The patient is nervous/anxious.          /80 (BP Location: Right arm, Patient Position: Sitting, BP Method: Large (Manual))   Pulse  63   Temp 98.3 °F (36.8 °C) (Temporal)   Wt 106.4 kg (234 lb 9.1 oz)   LMP 09/22/2020 (Exact Date)   SpO2 95%   BMI 42.90 kg/m²   Objective:      Physical Exam  Constitutional:       Appearance: She is well-developed.   HENT:      Head: Normocephalic and atraumatic.   Cardiovascular:      Rate and Rhythm: Normal rate and regular rhythm.      Pulses:           Dorsalis pedis pulses are 2+ on the right side and 2+ on the left side.      Heart sounds: Normal heart sounds. No murmur.   Pulmonary:      Effort: Pulmonary effort is normal.      Breath sounds: Normal breath sounds. No wheezing.   Abdominal:      General: Bowel sounds are normal.      Palpations: Abdomen is soft.      Tenderness: There is no abdominal tenderness.   Musculoskeletal:         General: No tenderness.   Feet:      Right foot:      Protective Sensation: 10 sites tested. 10 sites sensed.      Left foot:      Protective Sensation: 10 sites tested. 10 sites sensed.   Skin:     General: Skin is warm and dry.      Findings: No rash.   Neurological:      Mental Status: She is alert and oriented to person, place, and time.   Psychiatric:         Mood and Affect: Mood normal.           Assessment/Plan:   1. Routine general medical examination at a health care facility  - CBC auto differential; Future  - Comprehensive Metabolic Panel; Future  - Lipid Panel; Future  - TSH; Future  - Urinalysis; Future  - HIV 1/2 Ag/Ab (4th Gen); Future  Vital signs stable today.  Clinical exam stable  Continue lifestyle modifications with low-fat and low-cholesterol diet and exercise 30 min daily  Flu shot given today    2. Hypertension associated with diabetes  - Comprehensive Metabolic Panel; Future  - Lipid Panel; Future  - TSH; Future  - Urinalysis; Future  - ramipriL (ALTACE) 5 MG capsule; Take 1 capsule (5 mg total) by mouth once daily.  Dispense: 90 capsule; Refill: 1  Blood pressure is stable currently on ramipril 5 mg daily    3. Hyperlipidemia associated with  type 2 diabetes mellitus  - Lipid Panel; Future  - simvastatin (ZOCOR) 40 MG tablet; Take 1 tablet (40 mg total) by mouth every evening.  Dispense: 90 tablet; Refill: 1  Currently taking simvastatin 40 mg daily    4. Uncontrolled type 2 diabetes mellitus with diabetic polyneuropathy, with long-term current use of insulin  - Comprehensive Metabolic Panel; Future  - Lipid Panel; Future  - Hemoglobin A1C; Future  - Microalbumin/Creatinine Ratio, Urine; Future  - insulin detemir U-100 (LEVEMIR FLEXTOUCH U-100 INSULN) 100 unit/mL (3 mL) InPn pen; INJECT 24 UNITS INTO THE SKIN 2 (TWO) TIMES DAILY.  Dispense: 15 mL; Refill: 11  - dulaglutide (TRULICITY) 1.5 mg/0.5 mL pen injector; Inject 1.5 mg into the skin every 7 days.  Dispense: 3 mL; Refill: 11  Currently on Levemir 24 units twice daily, refill given today, Trulicity weekly and NovoLog 26 units 3 times a day, Actos 15 mg daily  Patient currently being followed by diabetes clinic  Will check to see if there is any improvement in A1c  Continue strict lifestyle changes with 1800 ADA low-fat and low-cholesterol diet and exercise 30 min daily  Medication compliance discussed with patient  CUONG form signed to obtain eye exam report    5. Morbid obesity with BMI of 40.0-44.9, adult  Strict lifestyle changes recommended with diet and exercise to lose weight with BMI 42    6. Carpal tunnel syndrome of right wrist  Advised to continue using carpal tunnel sleeve and if it gets worse discuss further with orthopedic physician for surgery    7. Need for assessment for sleep apnea  - Ambulatory referral/consult to Pulmonology; Future  Patient has not seen pulmonologist for sleep apnea evaluation last year and requesting referral again    8. PRETTY (generalized anxiety disorder)  - buPROPion (WELLBUTRIN SR) 150 MG TBSR 12 hr tablet; Take 1 tablet (150 mg total) by mouth 2 (two) times daily.  Dispense: 180 tablet; Refill: 1  Stable on Wellbutrin 150 mg twice daily    9. Well woman exam  -  Ambulatory referral/consult to Obstetrics / Gynecology; Future   Due for Pap smear

## 2020-10-26 ENCOUNTER — PATIENT OUTREACH (OUTPATIENT)
Dept: ADMINISTRATIVE | Facility: HOSPITAL | Age: 40
End: 2020-10-26

## 2020-11-02 ENCOUNTER — PATIENT MESSAGE (OUTPATIENT)
Dept: OBSTETRICS AND GYNECOLOGY | Facility: CLINIC | Age: 40
End: 2020-11-02

## 2020-11-02 ENCOUNTER — OFFICE VISIT (OUTPATIENT)
Dept: URGENT CARE | Facility: CLINIC | Age: 40
End: 2020-11-02
Payer: COMMERCIAL

## 2020-11-02 VITALS
DIASTOLIC BLOOD PRESSURE: 90 MMHG | TEMPERATURE: 98 F | HEART RATE: 90 BPM | BODY MASS INDEX: 45.91 KG/M2 | WEIGHT: 251 LBS | SYSTOLIC BLOOD PRESSURE: 141 MMHG

## 2020-11-02 DIAGNOSIS — R11.2 NON-INTRACTABLE VOMITING WITH NAUSEA, UNSPECIFIED VOMITING TYPE: ICD-10-CM

## 2020-11-02 DIAGNOSIS — J06.9 BACTERIAL UPPER RESPIRATORY INFECTION: ICD-10-CM

## 2020-11-02 DIAGNOSIS — R05.9 COUGH: Primary | ICD-10-CM

## 2020-11-02 DIAGNOSIS — B96.89 BACTERIAL UPPER RESPIRATORY INFECTION: ICD-10-CM

## 2020-11-02 LAB
CTP QC/QA: YES
SARS-COV-2 RDRP RESP QL NAA+PROBE: NEGATIVE

## 2020-11-02 PROCEDURE — 3077F SYST BP >= 140 MM HG: CPT | Mod: CPTII,S$GLB,, | Performed by: PHYSICIAN ASSISTANT

## 2020-11-02 PROCEDURE — 3008F PR BODY MASS INDEX (BMI) DOCUMENTED: ICD-10-PCS | Mod: CPTII,S$GLB,, | Performed by: PHYSICIAN ASSISTANT

## 2020-11-02 PROCEDURE — 3080F DIAST BP >= 90 MM HG: CPT | Mod: CPTII,S$GLB,, | Performed by: PHYSICIAN ASSISTANT

## 2020-11-02 PROCEDURE — 99999 PR PBB SHADOW E&M-EST. PATIENT-LVL IV: ICD-10-PCS | Mod: PBBFAC,,, | Performed by: PHYSICIAN ASSISTANT

## 2020-11-02 PROCEDURE — 99214 OFFICE O/P EST MOD 30 MIN: CPT | Mod: S$GLB,,, | Performed by: PHYSICIAN ASSISTANT

## 2020-11-02 PROCEDURE — 99999 PR PBB SHADOW E&M-EST. PATIENT-LVL IV: CPT | Mod: PBBFAC,,, | Performed by: PHYSICIAN ASSISTANT

## 2020-11-02 PROCEDURE — 3077F PR MOST RECENT SYSTOLIC BLOOD PRESSURE >= 140 MM HG: ICD-10-PCS | Mod: CPTII,S$GLB,, | Performed by: PHYSICIAN ASSISTANT

## 2020-11-02 PROCEDURE — 3080F PR MOST RECENT DIASTOLIC BLOOD PRESSURE >= 90 MM HG: ICD-10-PCS | Mod: CPTII,S$GLB,, | Performed by: PHYSICIAN ASSISTANT

## 2020-11-02 PROCEDURE — U0002: ICD-10-PCS | Mod: QW,S$GLB,, | Performed by: PHYSICIAN ASSISTANT

## 2020-11-02 PROCEDURE — 3008F BODY MASS INDEX DOCD: CPT | Mod: CPTII,S$GLB,, | Performed by: PHYSICIAN ASSISTANT

## 2020-11-02 PROCEDURE — 99214 PR OFFICE/OUTPT VISIT, EST, LEVL IV, 30-39 MIN: ICD-10-PCS | Mod: S$GLB,,, | Performed by: PHYSICIAN ASSISTANT

## 2020-11-02 PROCEDURE — U0002 COVID-19 LAB TEST NON-CDC: HCPCS | Mod: QW,S$GLB,, | Performed by: PHYSICIAN ASSISTANT

## 2020-11-02 RX ORDER — AZITHROMYCIN 250 MG/1
TABLET, FILM COATED ORAL
Qty: 6 TABLET | Refills: 0 | Status: SHIPPED | OUTPATIENT
Start: 2020-11-02 | End: 2020-11-07

## 2020-11-02 NOTE — LETTER
November 2, 2020      [facility]  [address]  [facility] [place] [address]  Phone: [phone]  Fax: [phone]       Patient: Maikol Pacheco   YOB: 1980  Date of Visit: 11/02/2020    To Whom It May Concern:    Katelynn Pacheco  was at [facility] on 11/02/2020. She may return to work on 11/3/2020 with no restrictions. If you have any questions or concerns, or if I can be of further assistance, please do not hesitate to contact me.    Sincerely,    Kaushal Curran PA-C

## 2020-11-02 NOTE — PATIENT INSTRUCTIONS
Rapid COVID is negative today.    Advise increase p.o. fluids--at least 64 ounces of water/juice & rest  Meds:  Azithromycin sent to pharmacy.  Advise complete antibiotics.  Normal saline nasal wash to irrigate sinuses and for congestion/runny nose.  Cool mist humidifier/vaporizer.  Practice good handwashing.  Mucinex for cough and chest congestion.  Tylenol or Ibuprofen for fever, headache and body aches.  Warm salt water gargles for throat comfort.  Chloraseptic spray or lozenges for throat comfort.  See PCP or go to ER if symptoms worsen or fail to improve with treatment.

## 2020-11-02 NOTE — PROGRESS NOTES
Maikol Pacheco is a 40 y.o. female who presents today with complaints of dry cough, nasal congestion with post nasal drip, a couple of episodes of Nausea/vomiting last week and runny nose greater than 1 week ago.  Patient denies fever, body aches, chills, anosmia, or SOB.  No known exposure to COVID 19 but work is requesting that she be tested.    Review of Social history, family history, past medical history, allergies, and past surgical history performed.    Review of Systems:  Review of Systems   Constitutional: Negative for fever.   HENT: Positive for congestion. Negative for sore throat.    Respiratory: Positive for cough. Negative for shortness of breath.    Cardiovascular: Negative for chest pain.   Gastrointestinal: Positive for nausea and vomiting. Negative for abdominal pain.   Genitourinary: Negative for dysuria and frequency.   Musculoskeletal: Negative for back pain.   Neurological: Negative for headaches.   All other systems reviewed and are negative.      Physical Exam:  Vitals:    11/02/20 1422   BP: (!) 141/90   Pulse: 90   Temp: 98.4 °F (36.9 °C)     Physical Exam   Constitutional: She is oriented to person, place, and time and well-developed, well-nourished, and in no distress.   HENT:   Head: Normocephalic.   Right Ear: External ear normal.   Left Ear: External ear normal.   Mouth/Throat: No oropharyngeal exudate.   Neck: Normal range of motion.   Cardiovascular: Normal rate and normal heart sounds.   Pulmonary/Chest: Effort normal and breath sounds normal. No respiratory distress.   Neurological: She is alert and oriented to person, place, and time.   Skin: Skin is warm.   Psychiatric: Affect normal.   Vitals reviewed.      Assessment:  1. Cough  - POCT COVID-19 Rapid Screening    2. Non-intractable vomiting with nausea, unspecified vomiting type  - POCT COVID-19 Rapid Screening    3. Bacterial upper respiratory infection  - azithromycin (Z-SCOOBY) 250 MG tablet; Take 2 tablets by mouth  on day 1; Take 1 tablet by mouth on days 2-5  Dispense: 6 tablet; Refill: 0      Plan:  Advise increase p.o. fluids--at least 64 ounces of water/juice & rest  Meds:  Azithromycin sent to pharmacy.  Advise complete antibiotics.  Normal saline nasal wash to irrigate sinuses and for congestion/runny nose.  Cool mist humidifier/vaporizer.  Practice good handwashing.  Mucinex for cough and chest congestion.  Tylenol or Ibuprofen for fever, headache and body aches.  Warm salt water gargles for throat comfort.  Chloraseptic spray or lozenges for throat comfort.  See PCP or go to ER if symptoms worsen or fail to improve with treatment.

## 2020-11-04 ENCOUNTER — PATIENT OUTREACH (OUTPATIENT)
Dept: ADMINISTRATIVE | Facility: OTHER | Age: 40
End: 2020-11-04

## 2020-11-12 ENCOUNTER — OFFICE VISIT (OUTPATIENT)
Dept: PULMONOLOGY | Facility: CLINIC | Age: 40
End: 2020-11-12
Payer: COMMERCIAL

## 2020-11-12 VITALS
OXYGEN SATURATION: 97 % | WEIGHT: 249.13 LBS | DIASTOLIC BLOOD PRESSURE: 92 MMHG | BODY MASS INDEX: 45.84 KG/M2 | SYSTOLIC BLOOD PRESSURE: 128 MMHG | HEIGHT: 62 IN | HEART RATE: 90 BPM | RESPIRATION RATE: 16 BRPM

## 2020-11-12 DIAGNOSIS — E11.59 HYPERTENSION ASSOCIATED WITH DIABETES: Chronic | ICD-10-CM

## 2020-11-12 DIAGNOSIS — E66.01 MORBID OBESITY WITH BMI OF 45.0-49.9, ADULT: ICD-10-CM

## 2020-11-12 DIAGNOSIS — Z01.89 NEED FOR ASSESSMENT FOR SLEEP APNEA: ICD-10-CM

## 2020-11-12 DIAGNOSIS — G47.33 OSA (OBSTRUCTIVE SLEEP APNEA): Primary | ICD-10-CM

## 2020-11-12 DIAGNOSIS — I15.2 HYPERTENSION ASSOCIATED WITH DIABETES: Chronic | ICD-10-CM

## 2020-11-12 PROCEDURE — 3074F SYST BP LT 130 MM HG: CPT | Mod: CPTII,S$GLB,, | Performed by: INTERNAL MEDICINE

## 2020-11-12 PROCEDURE — 3008F PR BODY MASS INDEX (BMI) DOCUMENTED: ICD-10-PCS | Mod: CPTII,S$GLB,, | Performed by: INTERNAL MEDICINE

## 2020-11-12 PROCEDURE — 99999 PR PBB SHADOW E&M-EST. PATIENT-LVL IV: ICD-10-PCS | Mod: PBBFAC,,, | Performed by: INTERNAL MEDICINE

## 2020-11-12 PROCEDURE — 99999 PR PBB SHADOW E&M-EST. PATIENT-LVL IV: CPT | Mod: PBBFAC,,, | Performed by: INTERNAL MEDICINE

## 2020-11-12 PROCEDURE — 99204 OFFICE O/P NEW MOD 45 MIN: CPT | Mod: S$GLB,,, | Performed by: INTERNAL MEDICINE

## 2020-11-12 PROCEDURE — 3074F PR MOST RECENT SYSTOLIC BLOOD PRESSURE < 130 MM HG: ICD-10-PCS | Mod: CPTII,S$GLB,, | Performed by: INTERNAL MEDICINE

## 2020-11-12 PROCEDURE — 3008F BODY MASS INDEX DOCD: CPT | Mod: CPTII,S$GLB,, | Performed by: INTERNAL MEDICINE

## 2020-11-12 PROCEDURE — 99204 PR OFFICE/OUTPT VISIT, NEW, LEVL IV, 45-59 MIN: ICD-10-PCS | Mod: S$GLB,,, | Performed by: INTERNAL MEDICINE

## 2020-11-12 PROCEDURE — 3080F DIAST BP >= 90 MM HG: CPT | Mod: CPTII,S$GLB,, | Performed by: INTERNAL MEDICINE

## 2020-11-12 PROCEDURE — 3080F PR MOST RECENT DIASTOLIC BLOOD PRESSURE >= 90 MM HG: ICD-10-PCS | Mod: CPTII,S$GLB,, | Performed by: INTERNAL MEDICINE

## 2020-11-12 NOTE — LETTER
November 12, 2020      Maisha Bartholomew MD  [address]  [place] [address]           The [facility]  [address] [address] [facility] [address]  Phone: [phone]  Fax: [phone]          Patient: Maikol Pacheco   MR Number: [id number]   YOB: 1980   Date of Visit: 11/12/2020       Dear Dr. Maisha Bartholomew:    Thank you for referring Maikol Pacheco to me for evaluation. Attached you will find relevant portions of my assessment and plan of care.    If you have questions, please do not hesitate to call me. I look forward to following Maikol Pacheco along with you.    Sincerely,    Ashu Brasher MD    Enclosure  CC:  No Recipients    If you would like to receive this communication electronically, please contact [email or web address] or [phone] to request more information on [facility] access.    For providers and/or their staff who would like to refer a patient to [facility], please contact us through our one-stop-shop provider referral line, [facility], at [phone].    If you feel you have received this communication in error or would no longer like to receive these types of communications, please e-mail [email or web address]

## 2020-11-12 NOTE — PATIENT INSTRUCTIONS
Please call the [facility] to schedule sleep study at  [phone] . Usually take 1- 2 days to get insurance company approval.  You will need to schedule at follow up clinic appointment 7 days after the sleep study to review the results.

## 2020-11-12 NOTE — PROGRESS NOTES
Subjective:     Patient ID: Maikol Pacheco is a 40 y.o. female.    Chief Complaint:      HPI   Lost 30 lbs following gastric balloon  Considering gastric sleeve    Sleep Apnea  She presents for a sleep evaluation. She complains of snoring, periods of not breathing, decreased memory, decreased concentration, excessive daytime sleepiness, falling asleep while driving, reading, watching television, sinus problems, congested nose, feels sleepy during the day, take naps during the day.  Symptoms began 3 years ago, gradually worsening since that time.  She goes to sleep at 10:30- 11:00 weekdays and  weekends. She awakens 6 weekdays and 11-12 noon weekends. She falls asleep in 10 minutes.  Collar size na. She denies knees buckling with laughing. Previous evaluation and treatment has included none.          Past Medical History:   Diagnosis Date    Anemia     Diabetes mellitus type II 2008    BS- 400's last week    Gastroparesis     Hyperlipidemia     Hypertension     Morbid obesity      Past Surgical History:   Procedure Laterality Date    CARPAL TUNNEL RELEASE Left 10/17/2019    Procedure: RELEASE, CARPAL TUNNEL;  Surgeon: Francisco Aguilar MD;  Location: [facility];  Service: Orthopedics;  Laterality: Left;    gastric balloon      placement and removal via endoscopy     TONSILLECTOMY       Review of patient's allergies indicates:   Allergen Reactions    Clindamycin Hives, Itching and Rash     Current Outpatient Medications on File Prior to Visit   Medication Sig Dispense Refill    blood sugar diagnostic Strp 1 strip by Misc.(Non-Drug; Combo Route) route 2 (two) times daily. Test strips covered by insurance 100 strip 1    blood-glucose meter kit Blood glucose Meter kit covered by insurance. Use as instructed 1 each 0    blood-glucose meter,continuous (DEXCOM G6 ) Misc Use per 's instructions 1 each 0    blood-glucose sensor (DEXCOM G6 SENSOR) Shyanne Use one per 10 days per  "'s instruction. 3 each 11    blood-glucose transmitter (DEXCOM G6 TRANSMITTER) Shyanne Use per 's instructions 1 Device 3    buPROPion (WELLBUTRIN SR) 150 MG TBSR 12 hr tablet Take 1 tablet (150 mg total) by mouth 2 (two) times daily. 180 tablet 1    dulaglutide (TRULICITY) 1.5 mg/0.5 mL pen injector Inject 1.5 mg into the skin every 7 days. 3 mL 11    ferrous sulfate 325 mg (65 mg iron) Tab tablet Take 325 mg by mouth once daily.       flash glucose scanning reader (FREESTYLE VASILE 14 DAY READER) Misc 1 each by Misc.(Non-Drug; Combo Route) route once daily. 1 each 0    flash glucose sensor (FREESTYLE VASILE 14 DAY SENSOR) Kit 1 each by Misc.(Non-Drug; Combo Route) route every 14 (fourteen) days. 2 kit 11    insulin aspart U-100 (NOVOLOG FLEXPEN U-100 INSULIN) 100 unit/mL (3 mL) InPn pen TITRATE UP TO 26 UNITS SUBCUE 10-15 MIN BEFORE MEALS 3 TIMES A DAY AS DIRECTED 45 Syringe 3    insulin detemir U-100 (LEVEMIR FLEXTOUCH U-100 INSULN) 100 unit/mL (3 mL) InPn pen INJECT 24 UNITS INTO THE SKIN 2 (TWO) TIMES DAILY. 15 mL 11    lancets 33 gauge Misc 1 lancet by Misc.(Non-Drug; Combo Route) route 2 (two) times daily. Covered by insurance 100 each 1    pen needle, diabetic (BD ULTRA-FINE MINI PEN NEEDLE) 31 gauge x 3/16" Ndle AS DIRECTED 100 each 11    pioglitazone (ACTOS) 15 MG tablet Take 1 tablet (15 mg total) by mouth once daily. 30 tablet 11    ramipriL (ALTACE) 5 MG capsule Take 1 capsule (5 mg total) by mouth once daily. 90 capsule 1    simvastatin (ZOCOR) 40 MG tablet Take 1 tablet (40 mg total) by mouth every evening. 90 tablet 1     Current Facility-Administered Medications on File Prior to Visit   Medication Dose Route Frequency Provider Last Rate Last Dose    lactated ringers infusion   Intravenous Continuous Laly Millard MD 10 mL/hr at 10/17/19 0624      lidocaine (PF) 10 mg/ml (1%) injection 10 mg  1 mL Intradermal Once Laly Millard MD         Social History "     Socioeconomic History    Marital status: Single     Spouse name: Not on file    Number of children: 0    Years of education: Not on file    Highest education level: Not on file   Occupational History     Employer: [employer]   Social Needs    Financial resource strain: Not on file    Food insecurity     Worry: Not on file     Inability: Not on file    Transportation needs     Medical: Not on file     Non-medical: Not on file   Tobacco Use    Smoking status: Never Smoker    Smokeless tobacco: Never Used   Substance and Sexual Activity    Alcohol use: Yes     Frequency: Monthly or less     Comment: socially    Drug use: No    Sexual activity: Yes     Partners: Male     Birth control/protection: Condom   Lifestyle    Physical activity     Days per week: Not on file     Minutes per session: Not on file    Stress: Not on file   Relationships    Social connections     Talks on phone: Not on file     Gets together: Not on file     Attends Jehovah's witness service: Not on file     Active member of club or organization: Not on file     Attends meetings of clubs or organizations: Not on file     Relationship status: Not on file   Other Topics Concern    Not on file   Social History Narrative    Single. Lives with mom. Has no children. 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      Family History   Problem Relation Age of Onset    Hyperlipidemia Mother     Diabetes Maternal Grandfather     Cancer Maternal Aunt         breast    Diabetes Maternal Grandmother     Stroke Maternal Grandmother     Hypertension Other        Review of Systems   Constitutional: Negative for fever and fatigue.   HENT: Negative.  Negative for postnasal drip and rhinorrhea.    Eyes: Negative for redness and itching.   Respiratory: Positive for apnea and dyspnea on extertion. Negative for cough, shortness of breath, wheezing and Paroxysmal Nocturnal Dyspnea.    Cardiovascular: Negative.  Negative for chest pain.   Genitourinary:  "Negative.  Negative for difficulty urinating and hematuria.   Endocrine: endocrine negative Negative for polyphagia, cold intolerance and heat intolerance.    Musculoskeletal: Positive for back pain. Negative for arthralgias.   Skin: Negative.  Negative for rash.   Gastrointestinal: Negative.  Negative for nausea, vomiting, abdominal pain and abdominal distention.   Neurological: Negative.  Negative for dizziness and headaches.   Hematological: Negative for adenopathy. Does not bruise/bleed easily and no excessive bruising.   Psychiatric/Behavioral: Positive for sleep disturbance. The patient is not nervous/anxious.        Objective:      BP (!) 128/92   Pulse 90   Resp 16   Ht 5' 2" (1.575 m)   Wt 113 kg (249 lb 1.9 oz)   LMP 10/22/2020 (Approximate)   SpO2 97%   BMI 45.56 kg/m²   Physical Exam  Vitals signs and nursing note reviewed.   Constitutional:       Appearance: She is well-developed. She is obese.   HENT:      Head: Normocephalic and atraumatic.      Nose: Nose normal.   Eyes:      Conjunctiva/sclera: Conjunctivae normal.      Pupils: Pupils are equal, round, and reactive to light.   Neck:      Musculoskeletal: Neck supple.      Thyroid: No thyromegaly.      Vascular: No JVD.      Trachea: No tracheal deviation.   Cardiovascular:      Rate and Rhythm: Normal rate and regular rhythm.      Heart sounds: Normal heart sounds.   Pulmonary:      Effort: Pulmonary effort is normal. No respiratory distress.      Breath sounds: Normal breath sounds. No wheezing or rales.   Chest:      Chest wall: No tenderness.   Abdominal:      General: Bowel sounds are normal.      Palpations: Abdomen is soft.   Musculoskeletal: Normal range of motion.   Lymphadenopathy:      Cervical: No cervical adenopathy.   Skin:     General: Skin is warm and dry.   Neurological:      Mental Status: She is alert and oriented to person, place, and time.       Personal Diagnostic Review  none pertinent  Mammo Digital Screening Bilat w/ " Elijah  Narrative: Result:  Mammo Digital Screening Bilat w/ Elijah    History:  Patient is 39 y.o. and is seen for a screening mammogram.    Films Compared:  Prior images (if available) were compared.    Findings:  This procedure was performed using tomosynthesis. Computer-aided detection   was utilized in the interpretation of this examination.     The breasts are almost entirely fatty. There is no evidence of suspicious   masses, microcalcifications or architectural distortion.  Impression: No mammographic evidence of malignancy.    BI-RADS Category 1: Negative    Recommendation:  Routine screening mammogram in 1 year is recommended.    Your estimated lifetime risk of breast cancer (to age 85) based on   Tyrer-Cuzick risk assessment model is Tyrer-Cuzick: 14.14 %. According to   the American Cancer Society, patients with a lifetime breast cancer risk   of 20% or higher might benefit from supplemental screening tests.      Office Spirometry Results:     No flowsheet data found.  Pulmonary Studies Review 11/12/2020   SpO2 97   Height 62   Weight 3985.92   BMI (Calculated) 45.6   Predicted Distance 354.43   Predicted Distance Meters (Calculated) 503.58         Assessment:       Hypertension associated with diabetes  Blood pressure measurements reviewed, repeated and verified. Adverse effects of elevated blood pressure reviewed in detail. Importance of low sodium diet, medication compliance stressed. Patient advised and counseled concerning the adverse medical consequences of untreated hypertension.The patient's hypertension was monitored, evaluated, addressed and/or treated. Asked to follow up with PCP.   Isolated diastolic hypertension is a blood pressure <130/?80 mmHg      Morbid obesity with BMI of 45.0-49.9, adult    BMI noted to be not significantly changed. Patient advised and counseled concerning the adverse medical consequences of obesity.  The patient's severe obesity was monitored, evaluated, addressed and/or  treated.       RACHANA (obstructive sleep apnea)  -     Home Sleep Studies; Future; Expected date: 11/12/2020    Need for assessment for sleep apnea  -     Ambulatory referral/consult to Pulmonology    Morbid obesity with BMI of 45.0-49.9, adult    Hypertension associated with diabetes          Outpatient Encounter Medications as of 11/12/2020   Medication Sig Dispense Refill    blood sugar diagnostic Strp 1 strip by Misc.(Non-Drug; Combo Route) route 2 (two) times daily. Test strips covered by insurance 100 strip 1    blood-glucose meter kit Blood glucose Meter kit covered by insurance. Use as instructed 1 each 0    blood-glucose meter,continuous (DEXCOM G6 ) Misc Use per 's instructions 1 each 0    blood-glucose sensor (DEXCOM G6 SENSOR) Shyanne Use one per 10 days per 's instruction. 3 each 11    blood-glucose transmitter (DEXCOM G6 TRANSMITTER) Shyanne Use per 's instructions 1 Device 3    buPROPion (WELLBUTRIN SR) 150 MG TBSR 12 hr tablet Take 1 tablet (150 mg total) by mouth 2 (two) times daily. 180 tablet 1    dulaglutide (TRULICITY) 1.5 mg/0.5 mL pen injector Inject 1.5 mg into the skin every 7 days. 3 mL 11    ferrous sulfate 325 mg (65 mg iron) Tab tablet Take 325 mg by mouth once daily.       flash glucose scanning reader (FREESTYLE VASILE 14 DAY READER) Misc 1 each by Misc.(Non-Drug; Combo Route) route once daily. 1 each 0    flash glucose sensor (FREESTYLE VASILE 14 DAY SENSOR) Kit 1 each by Misc.(Non-Drug; Combo Route) route every 14 (fourteen) days. 2 kit 11    insulin aspart U-100 (NOVOLOG FLEXPEN U-100 INSULIN) 100 unit/mL (3 mL) InPn pen TITRATE UP TO 26 UNITS SUBCUE 10-15 MIN BEFORE MEALS 3 TIMES A DAY AS DIRECTED 45 Syringe 3    insulin detemir U-100 (LEVEMIR FLEXTOUCH U-100 INSULN) 100 unit/mL (3 mL) InPn pen INJECT 24 UNITS INTO THE SKIN 2 (TWO) TIMES DAILY. 15 mL 11    lancets 33 gauge Misc 1 lancet by Misc.(Non-Drug; Combo Route) route 2 (two)  "times daily. Covered by insurance 100 each 1    pen needle, diabetic (BD ULTRA-FINE MINI PEN NEEDLE) 31 gauge x 3/16" Ndle AS DIRECTED 100 each 11    pioglitazone (ACTOS) 15 MG tablet Take 1 tablet (15 mg total) by mouth once daily. 30 tablet 11    ramipriL (ALTACE) 5 MG capsule Take 1 capsule (5 mg total) by mouth once daily. 90 capsule 1    simvastatin (ZOCOR) 40 MG tablet Take 1 tablet (40 mg total) by mouth every evening. 90 tablet 1     Facility-Administered Encounter Medications as of 11/12/2020   Medication Dose Route Frequency Provider Last Rate Last Dose    lactated ringers infusion   Intravenous Continuous Laly Millard MD 10 mL/hr at 10/17/19 0624      lidocaine (PF) 10 mg/ml (1%) injection 10 mg  1 mL Intradermal Once Laly Millard MD         Plan:       Requested Prescriptions      No prescriptions requested or ordered in this encounter     Problem List Items Addressed This Visit     Hypertension associated with diabetes (Chronic)    Current Assessment & Plan     Blood pressure measurements reviewed, repeated and verified. Adverse effects of elevated blood pressure reviewed in detail. Importance of low sodium diet, medication compliance stressed. Patient advised and counseled concerning the adverse medical consequences of untreated hypertension.The patient's hypertension was monitored, evaluated, addressed and/or treated. Asked to follow up with PCP.   Isolated diastolic hypertension is a blood pressure <130/?80 mmHg           RESOLVED: Morbid obesity with BMI of 45.0-49.9, adult    Current Assessment & Plan       BMI noted to be not significantly changed. Patient advised and counseled concerning the adverse medical consequences of obesity.  The patient's severe obesity was monitored, evaluated, addressed and/or treated.              Other Visit Diagnoses     RACHANA (obstructive sleep apnea)    -  Primary    Relevant Orders    Home Sleep Studies    Need for assessment for sleep apnea              "    Follow up in about 4 weeks (around 12/10/2020) for Review Sleep Study - on return, [facility] visit.    MEDICAL DECISION MAKING: Moderate to high complexity.  Overall, the multiple problems listed are of moderate to high severity that may impact quality of life and activities of daily living. Side effects of medications, treatment plan as well as options and alternatives reviewed and discussed with patient. There was counseling of patient concerning these issues.    Total time spent in face to face counseling and coordination of care - 45  minutes over 50% of time was used in discussion of prognosis, risks, benefits of treatment, instructions and compliance with regimen . Discussion with other physicians or health care providers (DME, NP, pharmacy, respiratory therapy) occurred.

## 2020-11-13 ENCOUNTER — TELEPHONE (OUTPATIENT)
Dept: PULMONOLOGY | Facility: CLINIC | Age: 40
End: 2020-11-13

## 2020-11-13 NOTE — TELEPHONE ENCOUNTER
----- Message from Chiquis Barrett sent at 11/13/2020  9:05 AM CST -----  [facility]

## 2020-11-16 ENCOUNTER — PATIENT MESSAGE (OUTPATIENT)
Dept: SLEEP MEDICINE | Facility: HOSPITAL | Age: 40
End: 2020-11-16

## 2020-11-28 PROCEDURE — 95800 PR SLEEP STUDY, UNATTENDED, RECORD HEART RATE/O2 SAT/RESP ANAL/SLEEP TIME: ICD-10-PCS | Mod: 26,,, | Performed by: INTERNAL MEDICINE

## 2020-11-28 PROCEDURE — 95800 SLP STDY UNATTENDED: CPT | Mod: 26,,, | Performed by: INTERNAL MEDICINE

## 2020-12-07 ENCOUNTER — PROCEDURE VISIT (OUTPATIENT)
Dept: SLEEP MEDICINE | Facility: CLINIC | Age: 40
End: 2020-12-07
Payer: COMMERCIAL

## 2020-12-07 DIAGNOSIS — G47.33 OSA (OBSTRUCTIVE SLEEP APNEA): Primary | ICD-10-CM

## 2020-12-07 PROCEDURE — 95800 SLP STDY UNATTENDED: CPT

## 2020-12-07 NOTE — Clinical Note
PHYSICIAN INTERPRETATION AND COMMENTS: Findings are consistent with mild, positional obstructive sleep apnea  (RACHANA). There is insufficient non-supine study time to assess the severity of non-positional obstructive sleep apnea (RACHANA). patient  had 2 night study. Night 2. AHI was 9.0 events per hour with 4.3 hr of sleep. Therapy indicated with CPAP. Auto PAP 4-20 cm  water pressure with mask of choice.  CLINICAL HISTORY: 40 year old female presented with: 14.5 inch neck, BMI of 46, an [place] sleepiness score of 19, history  of diabetes, depression and symptoms of nocturnal snoring, waking up choking and witnessed apneas. Based on the clinical history,  the patient has a high pre-test probability of having severe RACHANA.

## 2020-12-09 ENCOUNTER — PATIENT OUTREACH (OUTPATIENT)
Dept: ADMINISTRATIVE | Facility: OTHER | Age: 40
End: 2020-12-09

## 2020-12-09 NOTE — PROGRESS NOTES
Health Maintenance Due   Topic Date Due    HIV Screening  08/04/1994    Cervical Cancer Screening  12/09/2019    Hemoglobin A1c  03/30/2020    Foot Exam  05/29/2020    Lipid Panel  05/30/2020     Updates were requested from care everywhere.  Chart was reviewed for overdue Proactive [facility] Encounters (STEVE) topics (CRS, Breast Cancer Screening, Eye exam)  Health Maintenance has been updated.

## 2020-12-11 NOTE — PROCEDURES
Home Sleep Studies    Date/Time: 12/7/2020 8:00 AM  Performed by: Steven Judd MD  Authorized by: Ashu Brasher MD       PHYSICIAN INTERPRETATION AND COMMENTS: Findings are consistent with mild, positional obstructive sleep apnea  (RACHANA). There is insufficient non-supine study time to assess the severity of non-positional obstructive sleep apnea (RACHANA). patient  had 2 night study. Night 2. AHI was 9.0 events per hour with 4.3 hr of sleep. Therapy indicated with CPAP. Auto PAP 4-20 cm  water pressure with mask of choice.  CLINICAL HISTORY: 40 year old female presented with: 14.5 inch neck, BMI of 46, an [place] sleepiness score of 19, history  of diabetes, depression and symptoms of nocturnal snoring, waking up choking and witnessed apneas. Based on the clinical history,  the patient has a high pre-test probability of having severe RACHANA.

## 2020-12-18 ENCOUNTER — PATIENT OUTREACH (OUTPATIENT)
Dept: ADMINISTRATIVE | Facility: HOSPITAL | Age: 40
End: 2020-12-18

## 2020-12-18 NOTE — PROGRESS NOTES
Working A1c Report:     Pt due for annual labs ordered in 10/2020. Spoke with pt, scheduled labs 01/07/2020.

## 2021-01-07 ENCOUNTER — TELEPHONE (OUTPATIENT)
Dept: PULMONOLOGY | Facility: CLINIC | Age: 41
End: 2021-01-07

## 2021-01-07 ENCOUNTER — LAB VISIT (OUTPATIENT)
Dept: LAB | Facility: HOSPITAL | Age: 41
End: 2021-01-07
Attending: FAMILY MEDICINE
Payer: COMMERCIAL

## 2021-01-07 ENCOUNTER — LAB VISIT (OUTPATIENT)
Dept: LAB | Facility: HOSPITAL | Age: 41
End: 2021-01-07
Payer: COMMERCIAL

## 2021-01-07 ENCOUNTER — OFFICE VISIT (OUTPATIENT)
Dept: PULMONOLOGY | Facility: CLINIC | Age: 41
End: 2021-01-07
Payer: COMMERCIAL

## 2021-01-07 DIAGNOSIS — E11.59 HYPERTENSION ASSOCIATED WITH DIABETES: Chronic | ICD-10-CM

## 2021-01-07 DIAGNOSIS — I15.2 HYPERTENSION ASSOCIATED WITH DIABETES: Chronic | ICD-10-CM

## 2021-01-07 DIAGNOSIS — Z00.00 ROUTINE GENERAL MEDICAL EXAMINATION AT A HEALTH CARE FACILITY: ICD-10-CM

## 2021-01-07 DIAGNOSIS — E66.01 CLASS 3 SEVERE OBESITY DUE TO EXCESS CALORIES WITH SERIOUS COMORBIDITY IN ADULT, UNSPECIFIED BMI: ICD-10-CM

## 2021-01-07 DIAGNOSIS — G47.33 OSA (OBSTRUCTIVE SLEEP APNEA): Primary | ICD-10-CM

## 2021-01-07 PROBLEM — E66.813 CLASS 3 SEVERE OBESITY IN ADULT: Status: ACTIVE | Noted: 2021-01-07

## 2021-01-07 LAB
ALBUMIN SERPL BCP-MCNC: 2.9 G/DL (ref 3.5–5.2)
ALBUMIN/CREAT UR: 3.2 UG/MG (ref 0–30)
ALP SERPL-CCNC: 72 U/L (ref 55–135)
ALT SERPL W/O P-5'-P-CCNC: 12 U/L (ref 10–44)
ANION GAP SERPL CALC-SCNC: 9 MMOL/L (ref 8–16)
AST SERPL-CCNC: 14 U/L (ref 10–40)
BASOPHILS # BLD AUTO: 0.03 K/UL (ref 0–0.2)
BASOPHILS NFR BLD: 0.5 % (ref 0–1.9)
BILIRUB SERPL-MCNC: 0.1 MG/DL (ref 0.1–1)
BILIRUB UR QL STRIP: NEGATIVE
BUN SERPL-MCNC: 19 MG/DL (ref 6–20)
CALCIUM SERPL-MCNC: 8.3 MG/DL (ref 8.7–10.5)
CHLORIDE SERPL-SCNC: 107 MMOL/L (ref 95–110)
CLARITY UR: CLEAR
CO2 SERPL-SCNC: 25 MMOL/L (ref 23–29)
COLOR UR: YELLOW
CREAT SERPL-MCNC: 0.7 MG/DL (ref 0.5–1.4)
CREAT UR-MCNC: 93 MG/DL (ref 15–325)
DIFFERENTIAL METHOD: ABNORMAL
EOSINOPHIL # BLD AUTO: 0.1 K/UL (ref 0–0.5)
EOSINOPHIL NFR BLD: 2.1 % (ref 0–8)
ERYTHROCYTE [DISTWIDTH] IN BLOOD BY AUTOMATED COUNT: 14.1 % (ref 11.5–14.5)
EST. GFR  (AFRICAN AMERICAN): >60 ML/MIN/1.73 M^2
EST. GFR  (NON AFRICAN AMERICAN): >60 ML/MIN/1.73 M^2
ESTIMATED AVG GLUCOSE: 278 MG/DL (ref 68–131)
GLUCOSE SERPL-MCNC: 52 MG/DL (ref 70–110)
GLUCOSE UR QL STRIP: NEGATIVE
HBA1C MFR BLD HPLC: 11.3 % (ref 4–5.6)
HCT VFR BLD AUTO: 38.5 % (ref 37–48.5)
HGB BLD-MCNC: 11.7 G/DL (ref 12–16)
HGB UR QL STRIP: NEGATIVE
IMM GRANULOCYTES # BLD AUTO: 0 K/UL (ref 0–0.04)
IMM GRANULOCYTES NFR BLD AUTO: 0 % (ref 0–0.5)
KETONES UR QL STRIP: NEGATIVE
LEUKOCYTE ESTERASE UR QL STRIP: NEGATIVE
LYMPHOCYTES # BLD AUTO: 2.7 K/UL (ref 1–4.8)
LYMPHOCYTES NFR BLD: 46.2 % (ref 18–48)
MCH RBC QN AUTO: 28.1 PG (ref 27–31)
MCHC RBC AUTO-ENTMCNC: 30.4 G/DL (ref 32–36)
MCV RBC AUTO: 93 FL (ref 82–98)
MICROALBUMIN UR DL<=1MG/L-MCNC: 3 UG/ML
MONOCYTES # BLD AUTO: 0.5 K/UL (ref 0.3–1)
MONOCYTES NFR BLD: 8 % (ref 4–15)
NEUTROPHILS # BLD AUTO: 2.5 K/UL (ref 1.8–7.7)
NEUTROPHILS NFR BLD: 43.2 % (ref 38–73)
NITRITE UR QL STRIP: NEGATIVE
NRBC BLD-RTO: 0 /100 WBC
PH UR STRIP: 6 [PH] (ref 5–8)
PLATELET # BLD AUTO: 338 K/UL (ref 150–350)
PMV BLD AUTO: 11.3 FL (ref 9.2–12.9)
POTASSIUM SERPL-SCNC: 3.9 MMOL/L (ref 3.5–5.1)
PROT SERPL-MCNC: 7 G/DL (ref 6–8.4)
PROT UR QL STRIP: NEGATIVE
RBC # BLD AUTO: 4.16 M/UL (ref 4–5.4)
SODIUM SERPL-SCNC: 141 MMOL/L (ref 136–145)
SP GR UR STRIP: 1.02 (ref 1–1.03)
TSH SERPL DL<=0.005 MIU/L-ACNC: 1.18 UIU/ML (ref 0.4–4)
URN SPEC COLLECT METH UR: NORMAL
WBC # BLD AUTO: 5.76 K/UL (ref 3.9–12.7)

## 2021-01-07 PROCEDURE — 82043 UR ALBUMIN QUANTITATIVE: CPT

## 2021-01-07 PROCEDURE — 99213 OFFICE O/P EST LOW 20 MIN: CPT | Mod: 95,,, | Performed by: INTERNAL MEDICINE

## 2021-01-07 PROCEDURE — 80053 COMPREHEN METABOLIC PANEL: CPT

## 2021-01-07 PROCEDURE — 36415 COLL VENOUS BLD VENIPUNCTURE: CPT

## 2021-01-07 PROCEDURE — 99213 PR OFFICE/OUTPT VISIT, EST, LEVL III, 20-29 MIN: ICD-10-PCS | Mod: 95,,, | Performed by: INTERNAL MEDICINE

## 2021-01-07 PROCEDURE — 81003 URINALYSIS AUTO W/O SCOPE: CPT

## 2021-01-07 PROCEDURE — 85025 COMPLETE CBC W/AUTO DIFF WBC: CPT

## 2021-01-07 PROCEDURE — 83036 HEMOGLOBIN GLYCOSYLATED A1C: CPT

## 2021-01-07 PROCEDURE — 84443 ASSAY THYROID STIM HORMONE: CPT

## 2021-01-07 PROCEDURE — 86703 HIV-1/HIV-2 1 RESULT ANTBDY: CPT

## 2021-01-07 PROCEDURE — 82570 ASSAY OF URINE CREATININE: CPT

## 2021-01-08 LAB — HIV 1+2 AB+HIV1 P24 AG SERPL QL IA: NEGATIVE

## 2021-01-21 ENCOUNTER — INITIAL CONSULT (OUTPATIENT)
Dept: BARIATRICS | Facility: CLINIC | Age: 41
End: 2021-01-21
Payer: COMMERCIAL

## 2021-01-21 ENCOUNTER — HOSPITAL ENCOUNTER (OUTPATIENT)
Dept: RADIOLOGY | Facility: HOSPITAL | Age: 41
Discharge: HOME | End: 2021-01-21
Attending: PHYSICIAN ASSISTANT
Payer: COMMERCIAL

## 2021-01-21 ENCOUNTER — HOSPITAL ENCOUNTER (OUTPATIENT)
Dept: CARDIOLOGY | Facility: CLINIC | Age: 41
Discharge: HOME | End: 2021-01-21
Payer: COMMERCIAL

## 2021-01-21 VITALS
SYSTOLIC BLOOD PRESSURE: 140 MMHG | WEIGHT: 257.25 LBS | BODY MASS INDEX: 47.34 KG/M2 | HEART RATE: 94 BPM | DIASTOLIC BLOOD PRESSURE: 70 MMHG | RESPIRATION RATE: 18 BRPM | OXYGEN SATURATION: 98 % | HEIGHT: 62 IN

## 2021-01-21 DIAGNOSIS — I15.2 HYPERTENSION ASSOCIATED WITH DIABETES: Chronic | ICD-10-CM

## 2021-01-21 DIAGNOSIS — D50.9 IRON DEFICIENCY ANEMIA, UNSPECIFIED IRON DEFICIENCY ANEMIA TYPE: ICD-10-CM

## 2021-01-21 DIAGNOSIS — E11.59 HYPERTENSION ASSOCIATED WITH DIABETES: ICD-10-CM

## 2021-01-21 DIAGNOSIS — E78.5 HYPERLIPIDEMIA ASSOCIATED WITH TYPE 2 DIABETES MELLITUS: ICD-10-CM

## 2021-01-21 DIAGNOSIS — E66.01 MORBID OBESITY DUE TO EXCESS CALORIES: ICD-10-CM

## 2021-01-21 DIAGNOSIS — E11.9 TYPE 2 DIABETES MELLITUS WITHOUT COMPLICATION, WITHOUT LONG-TERM CURRENT USE OF INSULIN: Primary | ICD-10-CM

## 2021-01-21 DIAGNOSIS — I15.2 HYPERTENSION ASSOCIATED WITH DIABETES: ICD-10-CM

## 2021-01-21 DIAGNOSIS — E78.5 HYPERLIPIDEMIA ASSOCIATED WITH TYPE 2 DIABETES MELLITUS: Chronic | ICD-10-CM

## 2021-01-21 DIAGNOSIS — E11.59 HYPERTENSION ASSOCIATED WITH DIABETES: Chronic | ICD-10-CM

## 2021-01-21 DIAGNOSIS — E11.9 TYPE 2 DIABETES MELLITUS WITHOUT COMPLICATION, WITHOUT LONG-TERM CURRENT USE OF INSULIN: ICD-10-CM

## 2021-01-21 DIAGNOSIS — E11.69 HYPERLIPIDEMIA ASSOCIATED WITH TYPE 2 DIABETES MELLITUS: ICD-10-CM

## 2021-01-21 DIAGNOSIS — E11.69 HYPERLIPIDEMIA ASSOCIATED WITH TYPE 2 DIABETES MELLITUS: Chronic | ICD-10-CM

## 2021-01-21 PROCEDURE — 99205 OFFICE O/P NEW HI 60 MIN: CPT | Mod: S$GLB,,, | Performed by: PHYSICIAN ASSISTANT

## 2021-01-21 PROCEDURE — 71046 X-RAY EXAM CHEST 2 VIEWS: CPT | Mod: 26,,, | Performed by: RADIOLOGY

## 2021-01-21 PROCEDURE — 3046F HEMOGLOBIN A1C LEVEL >9.0%: CPT | Mod: CPTII,S$GLB,, | Performed by: PHYSICIAN ASSISTANT

## 2021-01-21 PROCEDURE — 93010 EKG 12-LEAD: ICD-10-PCS | Mod: S$GLB,,, | Performed by: INTERNAL MEDICINE

## 2021-01-21 PROCEDURE — 99999 PR PBB SHADOW E&M-EST. PATIENT-LVL III: CPT | Mod: PBBFAC,,, | Performed by: PHYSICIAN ASSISTANT

## 2021-01-21 PROCEDURE — 71046 X-RAY EXAM CHEST 2 VIEWS: CPT | Mod: TC,FY

## 2021-01-21 PROCEDURE — 3077F PR MOST RECENT SYSTOLIC BLOOD PRESSURE >= 140 MM HG: ICD-10-PCS | Mod: CPTII,S$GLB,, | Performed by: PHYSICIAN ASSISTANT

## 2021-01-21 PROCEDURE — 93010 ELECTROCARDIOGRAM REPORT: CPT | Mod: S$GLB,,, | Performed by: INTERNAL MEDICINE

## 2021-01-21 PROCEDURE — 93005 ELECTROCARDIOGRAM TRACING: CPT | Mod: S$GLB

## 2021-01-21 PROCEDURE — 99999 PR PBB SHADOW E&M-EST. PATIENT-LVL III: ICD-10-PCS | Mod: PBBFAC,,, | Performed by: PHYSICIAN ASSISTANT

## 2021-01-21 PROCEDURE — 3078F PR MOST RECENT DIASTOLIC BLOOD PRESSURE < 80 MM HG: ICD-10-PCS | Mod: CPTII,S$GLB,, | Performed by: PHYSICIAN ASSISTANT

## 2021-01-21 PROCEDURE — 3046F PR MOST RECENT HEMOGLOBIN A1C LEVEL > 9.0%: ICD-10-PCS | Mod: CPTII,S$GLB,, | Performed by: PHYSICIAN ASSISTANT

## 2021-01-21 PROCEDURE — 71046 XR CHEST PA AND LATERAL: ICD-10-PCS | Mod: 26,,, | Performed by: RADIOLOGY

## 2021-01-21 PROCEDURE — 3077F SYST BP >= 140 MM HG: CPT | Mod: CPTII,S$GLB,, | Performed by: PHYSICIAN ASSISTANT

## 2021-01-21 PROCEDURE — 99205 PR OFFICE/OUTPT VISIT, NEW, LEVL V, 60-74 MIN: ICD-10-PCS | Mod: S$GLB,,, | Performed by: PHYSICIAN ASSISTANT

## 2021-01-21 PROCEDURE — 3078F DIAST BP <80 MM HG: CPT | Mod: CPTII,S$GLB,, | Performed by: PHYSICIAN ASSISTANT

## 2021-01-22 ENCOUNTER — LAB VISIT (OUTPATIENT)
Dept: LAB | Facility: HOSPITAL | Age: 41
End: 2021-01-22
Attending: PHYSICIAN ASSISTANT
Payer: COMMERCIAL

## 2021-01-22 DIAGNOSIS — E11.69 HYPERLIPIDEMIA ASSOCIATED WITH TYPE 2 DIABETES MELLITUS: Chronic | ICD-10-CM

## 2021-01-22 DIAGNOSIS — E11.59 HYPERTENSION ASSOCIATED WITH DIABETES: Chronic | ICD-10-CM

## 2021-01-22 DIAGNOSIS — E66.01 MORBID OBESITY DUE TO EXCESS CALORIES: ICD-10-CM

## 2021-01-22 DIAGNOSIS — D50.9 IRON DEFICIENCY ANEMIA, UNSPECIFIED IRON DEFICIENCY ANEMIA TYPE: ICD-10-CM

## 2021-01-22 DIAGNOSIS — E78.5 HYPERLIPIDEMIA ASSOCIATED WITH TYPE 2 DIABETES MELLITUS: Chronic | ICD-10-CM

## 2021-01-22 DIAGNOSIS — I15.2 HYPERTENSION ASSOCIATED WITH DIABETES: Chronic | ICD-10-CM

## 2021-01-22 DIAGNOSIS — E11.9 TYPE 2 DIABETES MELLITUS WITHOUT COMPLICATION, WITHOUT LONG-TERM CURRENT USE OF INSULIN: ICD-10-CM

## 2021-01-22 LAB
ALBUMIN SERPL BCP-MCNC: 3 G/DL (ref 3.5–5.2)
ALP SERPL-CCNC: 87 U/L (ref 55–135)
ALT SERPL W/O P-5'-P-CCNC: 7 U/L (ref 10–44)
ANION GAP SERPL CALC-SCNC: 10 MMOL/L (ref 8–16)
AST SERPL-CCNC: 12 U/L (ref 10–40)
BASOPHILS # BLD AUTO: 0.03 K/UL (ref 0–0.2)
BASOPHILS NFR BLD: 0.3 % (ref 0–1.9)
BILIRUB DIRECT SERPL-MCNC: 0.1 MG/DL (ref 0.1–0.3)
BILIRUB SERPL-MCNC: 0.1 MG/DL (ref 0.1–1)
BUN SERPL-MCNC: 15 MG/DL (ref 6–20)
CALCIUM SERPL-MCNC: 8.9 MG/DL (ref 8.7–10.5)
CHLORIDE SERPL-SCNC: 105 MMOL/L (ref 95–110)
CHOLEST SERPL-MCNC: 168 MG/DL (ref 120–199)
CHOLEST/HDLC SERPL: 3.4 {RATIO} (ref 2–5)
CO2 SERPL-SCNC: 24 MMOL/L (ref 23–29)
CREAT SERPL-MCNC: 0.7 MG/DL (ref 0.5–1.4)
DIFFERENTIAL METHOD: ABNORMAL
EOSINOPHIL # BLD AUTO: 0.2 K/UL (ref 0–0.5)
EOSINOPHIL NFR BLD: 2.1 % (ref 0–8)
ERYTHROCYTE [DISTWIDTH] IN BLOOD BY AUTOMATED COUNT: 14.5 % (ref 11.5–14.5)
EST. GFR  (AFRICAN AMERICAN): >60 ML/MIN/1.73 M^2
EST. GFR  (NON AFRICAN AMERICAN): >60 ML/MIN/1.73 M^2
ESTIMATED AVG GLUCOSE: 226 MG/DL (ref 68–131)
GLUCOSE SERPL-MCNC: 69 MG/DL (ref 70–110)
HBA1C MFR BLD HPLC: 9.5 % (ref 4–5.6)
HCT VFR BLD AUTO: 39 % (ref 37–48.5)
HDLC SERPL-MCNC: 49 MG/DL (ref 40–75)
HDLC SERPL: 29.2 % (ref 20–50)
HGB BLD-MCNC: 11.7 G/DL (ref 12–16)
IMM GRANULOCYTES # BLD AUTO: 0.02 K/UL (ref 0–0.04)
IMM GRANULOCYTES NFR BLD AUTO: 0.2 % (ref 0–0.5)
IRON SERPL-MCNC: 30 UG/DL (ref 30–160)
LDLC SERPL CALC-MCNC: 109 MG/DL (ref 63–159)
LYMPHOCYTES # BLD AUTO: 3.1 K/UL (ref 1–4.8)
LYMPHOCYTES NFR BLD: 36 % (ref 18–48)
MAGNESIUM SERPL-MCNC: 1.7 MG/DL (ref 1.6–2.6)
MCH RBC QN AUTO: 28.1 PG (ref 27–31)
MCHC RBC AUTO-ENTMCNC: 30 G/DL (ref 32–36)
MCV RBC AUTO: 94 FL (ref 82–98)
MONOCYTES # BLD AUTO: 0.5 K/UL (ref 0.3–1)
MONOCYTES NFR BLD: 5.4 % (ref 4–15)
NEUTROPHILS # BLD AUTO: 4.8 K/UL (ref 1.8–7.7)
NEUTROPHILS NFR BLD: 56 % (ref 38–73)
NONHDLC SERPL-MCNC: 119 MG/DL
NRBC BLD-RTO: 0 /100 WBC
PHOSPHATE SERPL-MCNC: 4.1 MG/DL (ref 2.7–4.5)
PLATELET # BLD AUTO: 352 K/UL (ref 150–350)
PMV BLD AUTO: 10.5 FL (ref 9.2–12.9)
POTASSIUM SERPL-SCNC: 3.9 MMOL/L (ref 3.5–5.1)
PROT SERPL-MCNC: 7.5 G/DL (ref 6–8.4)
RBC # BLD AUTO: 4.16 M/UL (ref 4–5.4)
SATURATED IRON: 8 % (ref 20–50)
SODIUM SERPL-SCNC: 139 MMOL/L (ref 136–145)
T3 SERPL-MCNC: 85 NG/DL (ref 60–180)
T4 FREE SERPL-MCNC: 0.89 NG/DL (ref 0.71–1.51)
T4 SERPL-MCNC: 7.2 UG/DL (ref 4.5–11.5)
TOTAL IRON BINDING CAPACITY: 361 UG/DL (ref 250–450)
TRANSFERRIN SERPL-MCNC: 244 MG/DL (ref 200–375)
TRIGL SERPL-MCNC: 50 MG/DL (ref 30–150)
TSH SERPL DL<=0.005 MIU/L-ACNC: 0.95 UIU/ML (ref 0.4–4)
WBC # BLD AUTO: 8.64 K/UL (ref 3.9–12.7)

## 2021-01-22 PROCEDURE — 84443 ASSAY THYROID STIM HORMONE: CPT

## 2021-01-22 PROCEDURE — 83036 HEMOGLOBIN GLYCOSYLATED A1C: CPT

## 2021-01-22 PROCEDURE — 84425 ASSAY OF VITAMIN B-1: CPT

## 2021-01-22 PROCEDURE — 86677 HELICOBACTER PYLORI ANTIBODY: CPT

## 2021-01-22 PROCEDURE — 82306 VITAMIN D 25 HYDROXY: CPT

## 2021-01-22 PROCEDURE — 84480 ASSAY TRIIODOTHYRONINE (T3): CPT

## 2021-01-22 PROCEDURE — 84439 ASSAY OF FREE THYROXINE: CPT

## 2021-01-22 PROCEDURE — 84100 ASSAY OF PHOSPHORUS: CPT

## 2021-01-22 PROCEDURE — 82607 VITAMIN B-12: CPT

## 2021-01-22 PROCEDURE — 80048 BASIC METABOLIC PNL TOTAL CA: CPT

## 2021-01-22 PROCEDURE — 84436 ASSAY OF TOTAL THYROXINE: CPT

## 2021-01-22 PROCEDURE — 36415 COLL VENOUS BLD VENIPUNCTURE: CPT

## 2021-01-22 PROCEDURE — 83735 ASSAY OF MAGNESIUM: CPT

## 2021-01-22 PROCEDURE — 85025 COMPLETE CBC W/AUTO DIFF WBC: CPT

## 2021-01-22 PROCEDURE — 80061 LIPID PANEL: CPT

## 2021-01-22 PROCEDURE — 80076 HEPATIC FUNCTION PANEL: CPT

## 2021-01-22 PROCEDURE — 82746 ASSAY OF FOLIC ACID SERUM: CPT

## 2021-01-22 PROCEDURE — 83540 ASSAY OF IRON: CPT

## 2021-01-23 LAB
25(OH)D3+25(OH)D2 SERPL-MCNC: 11 NG/ML (ref 30–96)
FOLATE SERPL-MCNC: 5.9 NG/ML (ref 4–24)
VIT B12 SERPL-MCNC: 953 PG/ML (ref 210–950)

## 2021-01-25 DIAGNOSIS — R79.89 LOW VITAMIN D LEVEL: Primary | ICD-10-CM

## 2021-01-25 RX ORDER — ERGOCALCIFEROL 1.25 MG/1
50000 CAPSULE ORAL
Qty: 24 CAPSULE | Refills: 0 | Status: SHIPPED | OUTPATIENT
Start: 2021-01-25 | End: 2021-04-23

## 2021-01-26 LAB — H PYLORI IGG SERPL QL IA: NEGATIVE

## 2021-01-27 LAB — VIT B1 BLD-MCNC: 44 UG/L (ref 38–122)

## 2021-01-29 ENCOUNTER — CLINICAL SUPPORT (OUTPATIENT)
Dept: BARIATRICS | Facility: CLINIC | Age: 41
End: 2021-01-29
Payer: COMMERCIAL

## 2021-01-29 DIAGNOSIS — E66.01 MORBID OBESITY WITH BMI OF 45.0-49.9, ADULT: ICD-10-CM

## 2021-01-29 DIAGNOSIS — E11.69 HYPERLIPIDEMIA ASSOCIATED WITH TYPE 2 DIABETES MELLITUS: Chronic | ICD-10-CM

## 2021-01-29 DIAGNOSIS — E78.5 HYPERLIPIDEMIA ASSOCIATED WITH TYPE 2 DIABETES MELLITUS: Chronic | ICD-10-CM

## 2021-01-29 DIAGNOSIS — E11.9 TYPE 2 DIABETES MELLITUS WITHOUT COMPLICATION, WITHOUT LONG-TERM CURRENT USE OF INSULIN: ICD-10-CM

## 2021-01-29 DIAGNOSIS — Z71.3 DIETARY COUNSELING: ICD-10-CM

## 2021-01-29 PROCEDURE — 99499 UNLISTED E&M SERVICE: CPT | Mod: 95,,, | Performed by: DIETITIAN, REGISTERED

## 2021-01-29 PROCEDURE — 97802 PR MED NUTR THER, 1ST, INDIV, EA 15 MIN: ICD-10-PCS | Mod: 95,,, | Performed by: DIETITIAN, REGISTERED

## 2021-01-29 PROCEDURE — 99499 NO LOS: ICD-10-PCS | Mod: 95,,, | Performed by: DIETITIAN, REGISTERED

## 2021-01-29 PROCEDURE — 97802 MEDICAL NUTRITION INDIV IN: CPT | Mod: 95,,, | Performed by: DIETITIAN, REGISTERED

## 2021-02-03 ENCOUNTER — PATIENT MESSAGE (OUTPATIENT)
Dept: PSYCHIATRY | Facility: CLINIC | Age: 41
End: 2021-02-03

## 2021-02-03 ENCOUNTER — PATIENT MESSAGE (OUTPATIENT)
Dept: BARIATRICS | Facility: CLINIC | Age: 41
End: 2021-02-03

## 2021-02-04 ENCOUNTER — OFFICE VISIT (OUTPATIENT)
Dept: PSYCHIATRY | Facility: CLINIC | Age: 41
End: 2021-02-04
Payer: COMMERCIAL

## 2021-02-04 DIAGNOSIS — Z01.818 PREOP EXAMINATION: Primary | ICD-10-CM

## 2021-02-04 DIAGNOSIS — E66.01 MORBID OBESITY DUE TO EXCESS CALORIES: ICD-10-CM

## 2021-02-04 DIAGNOSIS — E11.9 TYPE 2 DIABETES MELLITUS WITHOUT COMPLICATION, WITHOUT LONG-TERM CURRENT USE OF INSULIN: ICD-10-CM

## 2021-02-04 DIAGNOSIS — I15.2 HYPERTENSION ASSOCIATED WITH DIABETES: ICD-10-CM

## 2021-02-04 DIAGNOSIS — E11.69 HYPERLIPIDEMIA ASSOCIATED WITH TYPE 2 DIABETES MELLITUS: ICD-10-CM

## 2021-02-04 DIAGNOSIS — E11.59 HYPERTENSION ASSOCIATED WITH DIABETES: ICD-10-CM

## 2021-02-04 DIAGNOSIS — E78.5 HYPERLIPIDEMIA ASSOCIATED WITH TYPE 2 DIABETES MELLITUS: ICD-10-CM

## 2021-02-04 PROCEDURE — 90791 PSYCH DIAGNOSTIC EVALUATION: CPT | Mod: 95,,, | Performed by: PSYCHOLOGIST

## 2021-02-04 PROCEDURE — 90791 PR PSYCHIATRIC DIAGNOSTIC EVALUATION: ICD-10-PCS | Mod: 95,,, | Performed by: PSYCHOLOGIST

## 2021-02-17 RX ORDER — INSULIN ASPART 100 [IU]/ML
INJECTION, SOLUTION INTRAVENOUS; SUBCUTANEOUS
Qty: 15 ML | Refills: 1 | Status: SHIPPED | OUTPATIENT
Start: 2021-02-17 | End: 2021-04-23 | Stop reason: SDUPTHER

## 2021-02-19 ENCOUNTER — CLINICAL SUPPORT (OUTPATIENT)
Dept: BARIATRICS | Facility: CLINIC | Age: 41
End: 2021-02-19
Payer: COMMERCIAL

## 2021-02-19 DIAGNOSIS — E66.01 MORBID OBESITY WITH BODY MASS INDEX (BMI) OF 40.0 TO 49.9: ICD-10-CM

## 2021-02-19 DIAGNOSIS — I15.2 HYPERTENSION ASSOCIATED WITH DIABETES: Chronic | ICD-10-CM

## 2021-02-19 DIAGNOSIS — E11.9 TYPE 2 DIABETES MELLITUS WITHOUT COMPLICATION, WITHOUT LONG-TERM CURRENT USE OF INSULIN: ICD-10-CM

## 2021-02-19 DIAGNOSIS — Z71.3 DIETARY COUNSELING AND SURVEILLANCE: ICD-10-CM

## 2021-02-19 DIAGNOSIS — E11.59 HYPERTENSION ASSOCIATED WITH DIABETES: Chronic | ICD-10-CM

## 2021-02-19 PROCEDURE — 99499 NO LOS: ICD-10-PCS | Mod: 95,,, | Performed by: DIETITIAN, REGISTERED

## 2021-02-19 PROCEDURE — 97803 MED NUTRITION INDIV SUBSEQ: CPT | Mod: 95,,, | Performed by: DIETITIAN, REGISTERED

## 2021-02-19 PROCEDURE — 99499 UNLISTED E&M SERVICE: CPT | Mod: 95,,, | Performed by: DIETITIAN, REGISTERED

## 2021-02-19 PROCEDURE — 97803 PR MED NUTR THER, SUBSQ, INDIV, EA 15 MIN: ICD-10-PCS | Mod: 95,,, | Performed by: DIETITIAN, REGISTERED

## 2021-02-21 ENCOUNTER — PATIENT MESSAGE (OUTPATIENT)
Dept: BARIATRICS | Facility: CLINIC | Age: 41
End: 2021-02-21

## 2021-02-23 VITALS — BODY MASS INDEX: 47.08 KG/M2 | WEIGHT: 257.38 LBS

## 2021-03-05 ENCOUNTER — OFFICE VISIT (OUTPATIENT)
Dept: INTERNAL MEDICINE | Facility: CLINIC | Age: 41
End: 2021-03-05
Payer: COMMERCIAL

## 2021-03-05 VITALS
RESPIRATION RATE: 16 BRPM | DIASTOLIC BLOOD PRESSURE: 86 MMHG | BODY MASS INDEX: 47.5 KG/M2 | WEIGHT: 259.69 LBS | SYSTOLIC BLOOD PRESSURE: 120 MMHG | HEART RATE: 91 BPM | TEMPERATURE: 98 F

## 2021-03-05 DIAGNOSIS — Z12.31 ENCOUNTER FOR SCREENING MAMMOGRAM FOR MALIGNANT NEOPLASM OF BREAST: ICD-10-CM

## 2021-03-05 DIAGNOSIS — E66.01 MORBID OBESITY WITH BMI OF 45.0-49.9, ADULT: ICD-10-CM

## 2021-03-05 DIAGNOSIS — E11.65 TYPE 2 DIABETES MELLITUS WITH HYPERGLYCEMIA, WITH LONG-TERM CURRENT USE OF INSULIN: ICD-10-CM

## 2021-03-05 DIAGNOSIS — G56.01 CARPAL TUNNEL SYNDROME OF RIGHT WRIST: ICD-10-CM

## 2021-03-05 DIAGNOSIS — E78.5 HYPERLIPIDEMIA ASSOCIATED WITH TYPE 2 DIABETES MELLITUS: Chronic | ICD-10-CM

## 2021-03-05 DIAGNOSIS — E55.9 VITAMIN D DEFICIENCY: ICD-10-CM

## 2021-03-05 DIAGNOSIS — Z79.4 TYPE 2 DIABETES MELLITUS WITH HYPERGLYCEMIA, WITH LONG-TERM CURRENT USE OF INSULIN: ICD-10-CM

## 2021-03-05 DIAGNOSIS — I15.2 HYPERTENSION ASSOCIATED WITH DIABETES: Primary | Chronic | ICD-10-CM

## 2021-03-05 DIAGNOSIS — E11.69 HYPERLIPIDEMIA ASSOCIATED WITH TYPE 2 DIABETES MELLITUS: Chronic | ICD-10-CM

## 2021-03-05 DIAGNOSIS — E11.59 HYPERTENSION ASSOCIATED WITH DIABETES: Primary | Chronic | ICD-10-CM

## 2021-03-05 PROBLEM — K59.04 CHRONIC IDIOPATHIC CONSTIPATION: Status: RESOLVED | Noted: 2019-08-19 | Resolved: 2021-03-05

## 2021-03-05 PROCEDURE — 1126F PR PAIN SEVERITY QUANTIFIED, NO PAIN PRESENT: ICD-10-PCS | Mod: S$GLB,,, | Performed by: FAMILY MEDICINE

## 2021-03-05 PROCEDURE — 3008F BODY MASS INDEX DOCD: CPT | Mod: CPTII,S$GLB,, | Performed by: FAMILY MEDICINE

## 2021-03-05 PROCEDURE — 3074F PR MOST RECENT SYSTOLIC BLOOD PRESSURE < 130 MM HG: ICD-10-PCS | Mod: CPTII,S$GLB,, | Performed by: FAMILY MEDICINE

## 2021-03-05 PROCEDURE — 3008F PR BODY MASS INDEX (BMI) DOCUMENTED: ICD-10-PCS | Mod: CPTII,S$GLB,, | Performed by: FAMILY MEDICINE

## 2021-03-05 PROCEDURE — 99999 PR PBB SHADOW E&M-EST. PATIENT-LVL V: ICD-10-PCS | Mod: PBBFAC,,, | Performed by: FAMILY MEDICINE

## 2021-03-05 PROCEDURE — 3046F HEMOGLOBIN A1C LEVEL >9.0%: CPT | Mod: CPTII,S$GLB,, | Performed by: FAMILY MEDICINE

## 2021-03-05 PROCEDURE — 99999 PR PBB SHADOW E&M-EST. PATIENT-LVL V: CPT | Mod: PBBFAC,,, | Performed by: FAMILY MEDICINE

## 2021-03-05 PROCEDURE — 99214 OFFICE O/P EST MOD 30 MIN: CPT | Mod: S$GLB,,, | Performed by: FAMILY MEDICINE

## 2021-03-05 PROCEDURE — 3074F SYST BP LT 130 MM HG: CPT | Mod: CPTII,S$GLB,, | Performed by: FAMILY MEDICINE

## 2021-03-05 PROCEDURE — 3079F PR MOST RECENT DIASTOLIC BLOOD PRESSURE 80-89 MM HG: ICD-10-PCS | Mod: CPTII,S$GLB,, | Performed by: FAMILY MEDICINE

## 2021-03-05 PROCEDURE — 3079F DIAST BP 80-89 MM HG: CPT | Mod: CPTII,S$GLB,, | Performed by: FAMILY MEDICINE

## 2021-03-05 PROCEDURE — 3046F PR MOST RECENT HEMOGLOBIN A1C LEVEL > 9.0%: ICD-10-PCS | Mod: CPTII,S$GLB,, | Performed by: FAMILY MEDICINE

## 2021-03-05 PROCEDURE — 1126F AMNT PAIN NOTED NONE PRSNT: CPT | Mod: S$GLB,,, | Performed by: FAMILY MEDICINE

## 2021-03-05 PROCEDURE — 99214 PR OFFICE/OUTPT VISIT, EST, LEVL IV, 30-39 MIN: ICD-10-PCS | Mod: S$GLB,,, | Performed by: FAMILY MEDICINE

## 2021-03-05 RX ORDER — PIOGLITAZONEHYDROCHLORIDE 15 MG/1
15 TABLET ORAL DAILY
Qty: 90 TABLET | Refills: 1 | Status: SHIPPED | OUTPATIENT
Start: 2021-03-05 | End: 2022-04-13 | Stop reason: SDUPTHER

## 2021-03-11 ENCOUNTER — PATIENT MESSAGE (OUTPATIENT)
Dept: BARIATRICS | Facility: CLINIC | Age: 41
End: 2021-03-11

## 2021-03-16 ENCOUNTER — OFFICE VISIT (OUTPATIENT)
Dept: URGENT CARE | Facility: CLINIC | Age: 41
End: 2021-03-16
Payer: COMMERCIAL

## 2021-03-16 VITALS
WEIGHT: 260.13 LBS | BODY MASS INDEX: 47.58 KG/M2 | SYSTOLIC BLOOD PRESSURE: 138 MMHG | OXYGEN SATURATION: 98 % | HEART RATE: 90 BPM | DIASTOLIC BLOOD PRESSURE: 86 MMHG | RESPIRATION RATE: 16 BRPM | TEMPERATURE: 98 F

## 2021-03-16 DIAGNOSIS — R51.9 SINUS HEADACHE: ICD-10-CM

## 2021-03-16 DIAGNOSIS — E66.01 MORBID OBESITY WITH BMI OF 45.0-49.9, ADULT: ICD-10-CM

## 2021-03-16 DIAGNOSIS — R11.2 NAUSEA AND VOMITING IN ADULT: ICD-10-CM

## 2021-03-16 DIAGNOSIS — Z20.822 SUSPECTED COVID-19 VIRUS INFECTION: ICD-10-CM

## 2021-03-16 DIAGNOSIS — R68.89 FLU-LIKE SYMPTOMS: Primary | ICD-10-CM

## 2021-03-16 LAB
BILIRUB SERPL-MCNC: NORMAL MG/DL
BLOOD URINE, POC: NORMAL
CLARITY, POC UA: CLEAR
COLOR, POC UA: YELLOW
CTP QC/QA: YES
GLUCOSE UR QL STRIP: NORMAL
KETONES UR QL STRIP: NORMAL
LEUKOCYTE ESTERASE URINE, POC: NORMAL
NITRITE, POC UA: NORMAL
PH, POC UA: 5
PROTEIN, POC: NORMAL
SARS-COV-2 RDRP RESP QL NAA+PROBE: NEGATIVE
SPECIFIC GRAVITY, POC UA: 1.01
UROBILINOGEN, POC UA: NORMAL

## 2021-03-16 PROCEDURE — 3008F PR BODY MASS INDEX (BMI) DOCUMENTED: ICD-10-PCS | Mod: CPTII,S$GLB,, | Performed by: NURSE PRACTITIONER

## 2021-03-16 PROCEDURE — U0002 COVID-19 LAB TEST NON-CDC: HCPCS | Mod: QW,S$GLB,, | Performed by: NURSE PRACTITIONER

## 2021-03-16 PROCEDURE — 99999 PR PBB SHADOW E&M-EST. PATIENT-LVL IV: ICD-10-PCS | Mod: PBBFAC,,, | Performed by: NURSE PRACTITIONER

## 2021-03-16 PROCEDURE — U0002: ICD-10-PCS | Mod: QW,S$GLB,, | Performed by: NURSE PRACTITIONER

## 2021-03-16 PROCEDURE — 3079F DIAST BP 80-89 MM HG: CPT | Mod: CPTII,S$GLB,, | Performed by: NURSE PRACTITIONER

## 2021-03-16 PROCEDURE — 99214 PR OFFICE/OUTPT VISIT, EST, LEVL IV, 30-39 MIN: ICD-10-PCS | Mod: 25,S$GLB,, | Performed by: NURSE PRACTITIONER

## 2021-03-16 PROCEDURE — 3079F PR MOST RECENT DIASTOLIC BLOOD PRESSURE 80-89 MM HG: ICD-10-PCS | Mod: CPTII,S$GLB,, | Performed by: NURSE PRACTITIONER

## 2021-03-16 PROCEDURE — 3008F BODY MASS INDEX DOCD: CPT | Mod: CPTII,S$GLB,, | Performed by: NURSE PRACTITIONER

## 2021-03-16 PROCEDURE — 3075F SYST BP GE 130 - 139MM HG: CPT | Mod: CPTII,S$GLB,, | Performed by: NURSE PRACTITIONER

## 2021-03-16 PROCEDURE — 81002 URINALYSIS NONAUTO W/O SCOPE: CPT | Mod: S$GLB,,, | Performed by: NURSE PRACTITIONER

## 2021-03-16 PROCEDURE — 3075F PR MOST RECENT SYSTOLIC BLOOD PRESS GE 130-139MM HG: ICD-10-PCS | Mod: CPTII,S$GLB,, | Performed by: NURSE PRACTITIONER

## 2021-03-16 PROCEDURE — 81002 POCT URINE DIPSTICK WITHOUT MICROSCOPE: ICD-10-PCS | Mod: S$GLB,,, | Performed by: NURSE PRACTITIONER

## 2021-03-16 PROCEDURE — 99999 PR PBB SHADOW E&M-EST. PATIENT-LVL IV: CPT | Mod: PBBFAC,,, | Performed by: NURSE PRACTITIONER

## 2021-03-16 PROCEDURE — 99214 OFFICE O/P EST MOD 30 MIN: CPT | Mod: 25,S$GLB,, | Performed by: NURSE PRACTITIONER

## 2021-03-16 RX ORDER — ONDANSETRON 4 MG/1
4 TABLET, ORALLY DISINTEGRATING ORAL EVERY 6 HOURS PRN
Qty: 10 TABLET | Refills: 0 | Status: SHIPPED | OUTPATIENT
Start: 2021-03-16 | End: 2021-04-23

## 2021-03-17 ENCOUNTER — TELEPHONE (OUTPATIENT)
Dept: ADMINISTRATIVE | Facility: HOSPITAL | Age: 41
End: 2021-03-17

## 2021-03-18 ENCOUNTER — CLINICAL SUPPORT (OUTPATIENT)
Dept: BARIATRICS | Facility: CLINIC | Age: 41
End: 2021-03-18
Payer: COMMERCIAL

## 2021-03-18 DIAGNOSIS — E66.01 MORBID OBESITY WITH BMI OF 45.0-49.9, ADULT: ICD-10-CM

## 2021-03-18 DIAGNOSIS — I15.2 HYPERTENSION ASSOCIATED WITH DIABETES: Chronic | ICD-10-CM

## 2021-03-18 DIAGNOSIS — E11.59 HYPERTENSION ASSOCIATED WITH DIABETES: Chronic | ICD-10-CM

## 2021-03-18 DIAGNOSIS — Z71.3 DIETARY COUNSELING: ICD-10-CM

## 2021-03-18 DIAGNOSIS — Z79.4 TYPE 2 DIABETES MELLITUS WITH HYPERGLYCEMIA, WITH LONG-TERM CURRENT USE OF INSULIN: ICD-10-CM

## 2021-03-18 DIAGNOSIS — E11.65 TYPE 2 DIABETES MELLITUS WITH HYPERGLYCEMIA, WITH LONG-TERM CURRENT USE OF INSULIN: ICD-10-CM

## 2021-03-18 PROCEDURE — 97803 PR MED NUTR THER, SUBSQ, INDIV, EA 15 MIN: ICD-10-PCS | Mod: 95,,, | Performed by: DIETITIAN, REGISTERED

## 2021-03-18 PROCEDURE — 99499 NO LOS: ICD-10-PCS | Mod: 95,,, | Performed by: DIETITIAN, REGISTERED

## 2021-03-18 PROCEDURE — 99499 UNLISTED E&M SERVICE: CPT | Mod: 95,,, | Performed by: DIETITIAN, REGISTERED

## 2021-03-18 PROCEDURE — 97803 MED NUTRITION INDIV SUBSEQ: CPT | Mod: 95,,, | Performed by: DIETITIAN, REGISTERED

## 2021-04-12 ENCOUNTER — PATIENT MESSAGE (OUTPATIENT)
Dept: INTERNAL MEDICINE | Facility: CLINIC | Age: 41
End: 2021-04-12

## 2021-04-12 ENCOUNTER — TELEPHONE (OUTPATIENT)
Dept: DIABETES | Facility: CLINIC | Age: 41
End: 2021-04-12

## 2021-04-13 ENCOUNTER — PATIENT MESSAGE (OUTPATIENT)
Dept: INTERNAL MEDICINE | Facility: CLINIC | Age: 41
End: 2021-04-13

## 2021-04-13 RX ORDER — INSULIN ASPART 100 [IU]/ML
INJECTION, SOLUTION INTRAVENOUS; SUBCUTANEOUS
Qty: 15 ML | Refills: 1 | OUTPATIENT
Start: 2021-04-13

## 2021-04-15 ENCOUNTER — PATIENT MESSAGE (OUTPATIENT)
Dept: BARIATRICS | Facility: CLINIC | Age: 41
End: 2021-04-15

## 2021-04-16 ENCOUNTER — LAB VISIT (OUTPATIENT)
Dept: LAB | Facility: HOSPITAL | Age: 41
End: 2021-04-16
Payer: COMMERCIAL

## 2021-04-16 DIAGNOSIS — E78.5 HYPERLIPIDEMIA ASSOCIATED WITH TYPE 2 DIABETES MELLITUS: Chronic | ICD-10-CM

## 2021-04-16 DIAGNOSIS — E11.65 TYPE 2 DIABETES MELLITUS WITH HYPERGLYCEMIA, WITH LONG-TERM CURRENT USE OF INSULIN: ICD-10-CM

## 2021-04-16 DIAGNOSIS — E55.9 VITAMIN D DEFICIENCY: ICD-10-CM

## 2021-04-16 DIAGNOSIS — E11.69 HYPERLIPIDEMIA ASSOCIATED WITH TYPE 2 DIABETES MELLITUS: Chronic | ICD-10-CM

## 2021-04-16 DIAGNOSIS — Z00.00 ROUTINE GENERAL MEDICAL EXAMINATION AT A HEALTH CARE FACILITY: ICD-10-CM

## 2021-04-16 DIAGNOSIS — E11.59 HYPERTENSION ASSOCIATED WITH DIABETES: Chronic | ICD-10-CM

## 2021-04-16 DIAGNOSIS — I15.2 HYPERTENSION ASSOCIATED WITH DIABETES: Chronic | ICD-10-CM

## 2021-04-16 DIAGNOSIS — Z79.4 TYPE 2 DIABETES MELLITUS WITH HYPERGLYCEMIA, WITH LONG-TERM CURRENT USE OF INSULIN: ICD-10-CM

## 2021-04-16 LAB
25(OH)D3+25(OH)D2 SERPL-MCNC: 40 NG/ML (ref 30–96)
ALBUMIN SERPL BCP-MCNC: 3 G/DL (ref 3.5–5.2)
ALP SERPL-CCNC: 75 U/L (ref 55–135)
ALT SERPL W/O P-5'-P-CCNC: 9 U/L (ref 10–44)
ANION GAP SERPL CALC-SCNC: 9 MMOL/L (ref 8–16)
AST SERPL-CCNC: 12 U/L (ref 10–40)
BILIRUB SERPL-MCNC: 0.2 MG/DL (ref 0.1–1)
BUN SERPL-MCNC: 14 MG/DL (ref 6–20)
CALCIUM SERPL-MCNC: 8.9 MG/DL (ref 8.7–10.5)
CHLORIDE SERPL-SCNC: 103 MMOL/L (ref 95–110)
CHOLEST SERPL-MCNC: 143 MG/DL (ref 120–199)
CHOLEST SERPL-MCNC: 143 MG/DL (ref 120–199)
CHOLEST/HDLC SERPL: 2.9 {RATIO} (ref 2–5)
CHOLEST/HDLC SERPL: 2.9 {RATIO} (ref 2–5)
CO2 SERPL-SCNC: 26 MMOL/L (ref 23–29)
CREAT SERPL-MCNC: 0.7 MG/DL (ref 0.5–1.4)
EST. GFR  (AFRICAN AMERICAN): >60 ML/MIN/1.73 M^2
EST. GFR  (NON AFRICAN AMERICAN): >60 ML/MIN/1.73 M^2
ESTIMATED AVG GLUCOSE: 137 MG/DL (ref 68–131)
GLUCOSE SERPL-MCNC: 155 MG/DL (ref 70–110)
HBA1C MFR BLD: 6.4 % (ref 4–5.6)
HDLC SERPL-MCNC: 49 MG/DL (ref 40–75)
HDLC SERPL-MCNC: 49 MG/DL (ref 40–75)
HDLC SERPL: 34.3 % (ref 20–50)
HDLC SERPL: 34.3 % (ref 20–50)
LDLC SERPL CALC-MCNC: 84.6 MG/DL (ref 63–159)
LDLC SERPL CALC-MCNC: 84.6 MG/DL (ref 63–159)
NONHDLC SERPL-MCNC: 94 MG/DL
NONHDLC SERPL-MCNC: 94 MG/DL
POTASSIUM SERPL-SCNC: 4.1 MMOL/L (ref 3.5–5.1)
PROT SERPL-MCNC: 7.7 G/DL (ref 6–8.4)
SODIUM SERPL-SCNC: 138 MMOL/L (ref 136–145)
TRIGL SERPL-MCNC: 47 MG/DL (ref 30–150)
TRIGL SERPL-MCNC: 47 MG/DL (ref 30–150)
TSH SERPL DL<=0.005 MIU/L-ACNC: 0.84 UIU/ML (ref 0.4–4)

## 2021-04-16 PROCEDURE — 82306 VITAMIN D 25 HYDROXY: CPT | Performed by: FAMILY MEDICINE

## 2021-04-16 PROCEDURE — 36415 COLL VENOUS BLD VENIPUNCTURE: CPT | Performed by: FAMILY MEDICINE

## 2021-04-16 PROCEDURE — 80061 LIPID PANEL: CPT | Performed by: FAMILY MEDICINE

## 2021-04-16 PROCEDURE — 83036 HEMOGLOBIN GLYCOSYLATED A1C: CPT | Performed by: FAMILY MEDICINE

## 2021-04-16 PROCEDURE — 84443 ASSAY THYROID STIM HORMONE: CPT | Performed by: FAMILY MEDICINE

## 2021-04-16 PROCEDURE — 80053 COMPREHEN METABOLIC PANEL: CPT | Performed by: FAMILY MEDICINE

## 2021-04-19 ENCOUNTER — PATIENT MESSAGE (OUTPATIENT)
Dept: INTERNAL MEDICINE | Facility: CLINIC | Age: 41
End: 2021-04-19

## 2021-04-23 ENCOUNTER — OFFICE VISIT (OUTPATIENT)
Dept: INTERNAL MEDICINE | Facility: CLINIC | Age: 41
End: 2021-04-23
Payer: COMMERCIAL

## 2021-04-23 ENCOUNTER — PATIENT MESSAGE (OUTPATIENT)
Dept: INTERNAL MEDICINE | Facility: CLINIC | Age: 41
End: 2021-04-23

## 2021-04-23 ENCOUNTER — PATIENT MESSAGE (OUTPATIENT)
Dept: BARIATRICS | Facility: CLINIC | Age: 41
End: 2021-04-23

## 2021-04-23 VITALS
HEART RATE: 87 BPM | DIASTOLIC BLOOD PRESSURE: 92 MMHG | SYSTOLIC BLOOD PRESSURE: 136 MMHG | RESPIRATION RATE: 18 BRPM | WEIGHT: 270.5 LBS | BODY MASS INDEX: 49.78 KG/M2 | TEMPERATURE: 98 F | HEIGHT: 62 IN | OXYGEN SATURATION: 97 %

## 2021-04-23 DIAGNOSIS — D64.9 NORMOCYTIC ANEMIA: ICD-10-CM

## 2021-04-23 DIAGNOSIS — I15.2 HYPERTENSION ASSOCIATED WITH DIABETES: Primary | Chronic | ICD-10-CM

## 2021-04-23 DIAGNOSIS — E11.9 TYPE 2 DIABETES MELLITUS WITHOUT COMPLICATION, WITH LONG-TERM CURRENT USE OF INSULIN: ICD-10-CM

## 2021-04-23 DIAGNOSIS — G56.01 CARPAL TUNNEL SYNDROME OF RIGHT WRIST: ICD-10-CM

## 2021-04-23 DIAGNOSIS — E11.59 HYPERTENSION ASSOCIATED WITH DIABETES: Primary | Chronic | ICD-10-CM

## 2021-04-23 DIAGNOSIS — Z79.4 TYPE 2 DIABETES MELLITUS WITHOUT COMPLICATION, WITH LONG-TERM CURRENT USE OF INSULIN: ICD-10-CM

## 2021-04-23 DIAGNOSIS — E11.69 HYPERLIPIDEMIA ASSOCIATED WITH TYPE 2 DIABETES MELLITUS: Chronic | ICD-10-CM

## 2021-04-23 DIAGNOSIS — E78.5 HYPERLIPIDEMIA ASSOCIATED WITH TYPE 2 DIABETES MELLITUS: Chronic | ICD-10-CM

## 2021-04-23 DIAGNOSIS — E66.01 MORBID OBESITY WITH BMI OF 45.0-49.9, ADULT: ICD-10-CM

## 2021-04-23 PROBLEM — E55.9 VITAMIN D DEFICIENCY: Status: RESOLVED | Noted: 2021-03-05 | Resolved: 2021-04-23

## 2021-04-23 PROCEDURE — 3008F BODY MASS INDEX DOCD: CPT | Mod: CPTII,S$GLB,, | Performed by: FAMILY MEDICINE

## 2021-04-23 PROCEDURE — 3008F PR BODY MASS INDEX (BMI) DOCUMENTED: ICD-10-PCS | Mod: CPTII,S$GLB,, | Performed by: FAMILY MEDICINE

## 2021-04-23 PROCEDURE — 99214 PR OFFICE/OUTPT VISIT, EST, LEVL IV, 30-39 MIN: ICD-10-PCS | Mod: S$GLB,,, | Performed by: FAMILY MEDICINE

## 2021-04-23 PROCEDURE — 99999 PR PBB SHADOW E&M-EST. PATIENT-LVL IV: CPT | Mod: PBBFAC,,, | Performed by: FAMILY MEDICINE

## 2021-04-23 PROCEDURE — 99999 PR PBB SHADOW E&M-EST. PATIENT-LVL IV: ICD-10-PCS | Mod: PBBFAC,,, | Performed by: FAMILY MEDICINE

## 2021-04-23 PROCEDURE — 99214 OFFICE O/P EST MOD 30 MIN: CPT | Mod: S$GLB,,, | Performed by: FAMILY MEDICINE

## 2021-04-23 PROCEDURE — 3080F DIAST BP >= 90 MM HG: CPT | Mod: CPTII,S$GLB,, | Performed by: FAMILY MEDICINE

## 2021-04-23 PROCEDURE — 3075F SYST BP GE 130 - 139MM HG: CPT | Mod: CPTII,S$GLB,, | Performed by: FAMILY MEDICINE

## 2021-04-23 PROCEDURE — 3044F HG A1C LEVEL LT 7.0%: CPT | Mod: CPTII,S$GLB,, | Performed by: FAMILY MEDICINE

## 2021-04-23 PROCEDURE — 3075F PR MOST RECENT SYSTOLIC BLOOD PRESS GE 130-139MM HG: ICD-10-PCS | Mod: CPTII,S$GLB,, | Performed by: FAMILY MEDICINE

## 2021-04-23 PROCEDURE — 3080F PR MOST RECENT DIASTOLIC BLOOD PRESSURE >= 90 MM HG: ICD-10-PCS | Mod: CPTII,S$GLB,, | Performed by: FAMILY MEDICINE

## 2021-04-23 PROCEDURE — 3044F PR MOST RECENT HEMOGLOBIN A1C LEVEL <7.0%: ICD-10-PCS | Mod: CPTII,S$GLB,, | Performed by: FAMILY MEDICINE

## 2021-04-23 RX ORDER — INSULIN ASPART 100 [IU]/ML
INJECTION, SOLUTION INTRAVENOUS; SUBCUTANEOUS
Qty: 15 ML | Refills: 3 | Status: SHIPPED | OUTPATIENT
Start: 2021-04-23 | End: 2021-05-13 | Stop reason: SDUPTHER

## 2021-04-23 RX ORDER — LOSARTAN POTASSIUM 50 MG/1
50 TABLET ORAL DAILY
Qty: 90 TABLET | Refills: 3 | Status: SHIPPED | OUTPATIENT
Start: 2021-04-23 | End: 2022-08-03 | Stop reason: SDUPTHER

## 2021-04-24 ENCOUNTER — PATIENT MESSAGE (OUTPATIENT)
Dept: INTERNAL MEDICINE | Facility: CLINIC | Age: 41
End: 2021-04-24

## 2021-04-26 ENCOUNTER — TELEPHONE (OUTPATIENT)
Dept: BARIATRICS | Facility: CLINIC | Age: 41
End: 2021-04-26

## 2021-04-26 ENCOUNTER — PATIENT MESSAGE (OUTPATIENT)
Dept: BARIATRICS | Facility: CLINIC | Age: 41
End: 2021-04-26

## 2021-04-26 NOTE — TELEPHONE ENCOUNTER
----- Message from Diana Wray sent at 3/19/2021 11:19 AM CDT -----  Regarding: check HgA1c  check HgA1c

## 2021-05-04 ENCOUNTER — PATIENT OUTREACH (OUTPATIENT)
Dept: ADMINISTRATIVE | Facility: OTHER | Age: 41
End: 2021-05-04

## 2021-05-06 ENCOUNTER — CLINICAL SUPPORT (OUTPATIENT)
Dept: BARIATRICS | Facility: CLINIC | Age: 41
End: 2021-05-06
Payer: COMMERCIAL

## 2021-05-06 DIAGNOSIS — Z71.3 DIETARY COUNSELING: ICD-10-CM

## 2021-05-06 DIAGNOSIS — I15.2 HYPERTENSION ASSOCIATED WITH DIABETES: Chronic | ICD-10-CM

## 2021-05-06 DIAGNOSIS — E11.9 TYPE 2 DIABETES MELLITUS WITHOUT COMPLICATION, WITH LONG-TERM CURRENT USE OF INSULIN: ICD-10-CM

## 2021-05-06 DIAGNOSIS — E66.01 MORBID OBESITY WITH BMI OF 45.0-49.9, ADULT: ICD-10-CM

## 2021-05-06 DIAGNOSIS — E11.59 HYPERTENSION ASSOCIATED WITH DIABETES: Chronic | ICD-10-CM

## 2021-05-06 DIAGNOSIS — Z79.4 TYPE 2 DIABETES MELLITUS WITHOUT COMPLICATION, WITH LONG-TERM CURRENT USE OF INSULIN: ICD-10-CM

## 2021-05-06 PROCEDURE — 99499 NO LOS: ICD-10-PCS | Mod: 95,,, | Performed by: DIETITIAN, REGISTERED

## 2021-05-06 PROCEDURE — 97803 PR MED NUTR THER, SUBSQ, INDIV, EA 15 MIN: ICD-10-PCS | Mod: 95,,, | Performed by: DIETITIAN, REGISTERED

## 2021-05-06 PROCEDURE — 99499 UNLISTED E&M SERVICE: CPT | Mod: 95,,, | Performed by: DIETITIAN, REGISTERED

## 2021-05-06 PROCEDURE — 97803 MED NUTRITION INDIV SUBSEQ: CPT | Mod: 95,,, | Performed by: DIETITIAN, REGISTERED

## 2021-05-11 ENCOUNTER — PATIENT MESSAGE (OUTPATIENT)
Dept: BARIATRICS | Facility: CLINIC | Age: 41
End: 2021-05-11

## 2021-05-13 ENCOUNTER — PATIENT MESSAGE (OUTPATIENT)
Dept: INTERNAL MEDICINE | Facility: CLINIC | Age: 41
End: 2021-05-13

## 2021-05-13 ENCOUNTER — PATIENT MESSAGE (OUTPATIENT)
Dept: BARIATRICS | Facility: CLINIC | Age: 41
End: 2021-05-13

## 2021-05-13 ENCOUNTER — TELEPHONE (OUTPATIENT)
Dept: BARIATRICS | Facility: CLINIC | Age: 41
End: 2021-05-13

## 2021-05-13 DIAGNOSIS — E11.69 HYPERLIPIDEMIA ASSOCIATED WITH TYPE 2 DIABETES MELLITUS: ICD-10-CM

## 2021-05-13 DIAGNOSIS — I15.2 HYPERTENSION ASSOCIATED WITH DIABETES: ICD-10-CM

## 2021-05-13 DIAGNOSIS — E11.65 TYPE 2 DIABETES MELLITUS WITH HYPERGLYCEMIA, WITH LONG-TERM CURRENT USE OF INSULIN: ICD-10-CM

## 2021-05-13 DIAGNOSIS — Z79.4 TYPE 2 DIABETES MELLITUS WITH HYPERGLYCEMIA, WITH LONG-TERM CURRENT USE OF INSULIN: ICD-10-CM

## 2021-05-13 DIAGNOSIS — E11.59 HYPERTENSION ASSOCIATED WITH DIABETES: ICD-10-CM

## 2021-05-13 DIAGNOSIS — E78.5 HYPERLIPIDEMIA ASSOCIATED WITH TYPE 2 DIABETES MELLITUS: ICD-10-CM

## 2021-05-13 DIAGNOSIS — E66.01 MORBID OBESITY: Primary | ICD-10-CM

## 2021-05-13 RX ORDER — INSULIN ASPART 100 [IU]/ML
INJECTION, SOLUTION INTRAVENOUS; SUBCUTANEOUS
Qty: 15 ML | Refills: 3 | Status: SHIPPED | OUTPATIENT
Start: 2021-05-13 | End: 2021-06-01 | Stop reason: SDUPTHER

## 2021-05-14 ENCOUNTER — TELEPHONE (OUTPATIENT)
Dept: BARIATRICS | Facility: CLINIC | Age: 41
End: 2021-05-14

## 2021-05-14 ENCOUNTER — PATIENT MESSAGE (OUTPATIENT)
Dept: BARIATRICS | Facility: CLINIC | Age: 41
End: 2021-05-14

## 2021-05-14 DIAGNOSIS — D50.9 IRON DEFICIENCY ANEMIA, UNSPECIFIED IRON DEFICIENCY ANEMIA TYPE: ICD-10-CM

## 2021-05-14 DIAGNOSIS — R79.89 LOW VITAMIN D LEVEL: ICD-10-CM

## 2021-05-14 DIAGNOSIS — Z79.4 TYPE 2 DIABETES MELLITUS WITH HYPERGLYCEMIA, WITH LONG-TERM CURRENT USE OF INSULIN: ICD-10-CM

## 2021-05-14 DIAGNOSIS — E11.65 TYPE 2 DIABETES MELLITUS WITH HYPERGLYCEMIA, WITH LONG-TERM CURRENT USE OF INSULIN: ICD-10-CM

## 2021-05-14 DIAGNOSIS — I15.2 HYPERTENSION ASSOCIATED WITH DIABETES: ICD-10-CM

## 2021-05-14 DIAGNOSIS — E11.59 HYPERTENSION ASSOCIATED WITH DIABETES: ICD-10-CM

## 2021-05-14 DIAGNOSIS — E66.01 MORBID OBESITY: Primary | ICD-10-CM

## 2021-05-19 ENCOUNTER — PATIENT MESSAGE (OUTPATIENT)
Dept: BARIATRICS | Facility: CLINIC | Age: 41
End: 2021-05-19

## 2021-05-24 ENCOUNTER — TELEPHONE (OUTPATIENT)
Dept: BARIATRICS | Facility: CLINIC | Age: 41
End: 2021-05-24

## 2021-05-25 ENCOUNTER — PATIENT MESSAGE (OUTPATIENT)
Dept: BARIATRICS | Facility: CLINIC | Age: 41
End: 2021-05-25

## 2021-05-25 ENCOUNTER — TELEPHONE (OUTPATIENT)
Dept: BARIATRICS | Facility: CLINIC | Age: 41
End: 2021-05-25

## 2021-05-26 ENCOUNTER — PATIENT MESSAGE (OUTPATIENT)
Dept: BARIATRICS | Facility: CLINIC | Age: 41
End: 2021-05-26

## 2021-05-31 ENCOUNTER — TELEPHONE (OUTPATIENT)
Dept: BARIATRICS | Facility: CLINIC | Age: 41
End: 2021-05-31

## 2021-06-01 ENCOUNTER — HOSPITAL ENCOUNTER (OUTPATIENT)
Dept: RADIOLOGY | Facility: HOSPITAL | Age: 41
Discharge: HOME | End: 2021-06-01
Attending: FAMILY MEDICINE
Payer: COMMERCIAL

## 2021-06-01 ENCOUNTER — OFFICE VISIT (OUTPATIENT)
Dept: INTERNAL MEDICINE | Facility: CLINIC | Age: 41
End: 2021-06-01
Payer: COMMERCIAL

## 2021-06-01 VITALS
DIASTOLIC BLOOD PRESSURE: 78 MMHG | BODY MASS INDEX: 49.57 KG/M2 | RESPIRATION RATE: 18 BRPM | OXYGEN SATURATION: 95 % | TEMPERATURE: 97 F | HEART RATE: 92 BPM | SYSTOLIC BLOOD PRESSURE: 114 MMHG | WEIGHT: 269.38 LBS | HEIGHT: 62 IN

## 2021-06-01 DIAGNOSIS — E78.5 HYPERLIPIDEMIA ASSOCIATED WITH TYPE 2 DIABETES MELLITUS: Chronic | ICD-10-CM

## 2021-06-01 DIAGNOSIS — E66.01 MORBID OBESITY WITH BMI OF 45.0-49.9, ADULT: ICD-10-CM

## 2021-06-01 DIAGNOSIS — Z12.31 ENCOUNTER FOR SCREENING MAMMOGRAM FOR MALIGNANT NEOPLASM OF BREAST: ICD-10-CM

## 2021-06-01 DIAGNOSIS — M47.26 OSTEOARTHRITIS OF SPINE WITH RADICULOPATHY, LUMBAR REGION: ICD-10-CM

## 2021-06-01 DIAGNOSIS — E11.9 TYPE 2 DIABETES MELLITUS WITHOUT COMPLICATION, WITH LONG-TERM CURRENT USE OF INSULIN: ICD-10-CM

## 2021-06-01 DIAGNOSIS — E11.69 HYPERLIPIDEMIA ASSOCIATED WITH TYPE 2 DIABETES MELLITUS: Chronic | ICD-10-CM

## 2021-06-01 DIAGNOSIS — I15.2 HYPERTENSION ASSOCIATED WITH DIABETES: Primary | Chronic | ICD-10-CM

## 2021-06-01 DIAGNOSIS — Z79.4 TYPE 2 DIABETES MELLITUS WITHOUT COMPLICATION, WITH LONG-TERM CURRENT USE OF INSULIN: ICD-10-CM

## 2021-06-01 DIAGNOSIS — M54.9 DORSALGIA, UNSPECIFIED: ICD-10-CM

## 2021-06-01 DIAGNOSIS — E11.59 HYPERTENSION ASSOCIATED WITH DIABETES: Primary | Chronic | ICD-10-CM

## 2021-06-01 PROCEDURE — 99999 PR PBB SHADOW E&M-EST. PATIENT-LVL IV: ICD-10-PCS | Mod: PBBFAC,,, | Performed by: FAMILY MEDICINE

## 2021-06-01 PROCEDURE — 3078F PR MOST RECENT DIASTOLIC BLOOD PRESSURE < 80 MM HG: ICD-10-PCS | Mod: CPTII,S$GLB,, | Performed by: FAMILY MEDICINE

## 2021-06-01 PROCEDURE — 99213 PR OFFICE/OUTPT VISIT, EST, LEVL III, 20-29 MIN: ICD-10-PCS | Mod: S$GLB,,, | Performed by: FAMILY MEDICINE

## 2021-06-01 PROCEDURE — 3008F PR BODY MASS INDEX (BMI) DOCUMENTED: ICD-10-PCS | Mod: CPTII,S$GLB,, | Performed by: FAMILY MEDICINE

## 2021-06-01 PROCEDURE — 77067 SCR MAMMO BI INCL CAD: CPT | Mod: 26,,, | Performed by: RADIOLOGY

## 2021-06-01 PROCEDURE — 72110 X-RAY EXAM L-2 SPINE 4/>VWS: CPT | Mod: 26,,, | Performed by: RADIOLOGY

## 2021-06-01 PROCEDURE — 77067 SCR MAMMO BI INCL CAD: CPT | Mod: TC

## 2021-06-01 PROCEDURE — 3078F DIAST BP <80 MM HG: CPT | Mod: CPTII,S$GLB,, | Performed by: FAMILY MEDICINE

## 2021-06-01 PROCEDURE — 77063 BREAST TOMOSYNTHESIS BI: CPT | Mod: 26,,, | Performed by: RADIOLOGY

## 2021-06-01 PROCEDURE — 3044F HG A1C LEVEL LT 7.0%: CPT | Mod: CPTII,S$GLB,, | Performed by: FAMILY MEDICINE

## 2021-06-01 PROCEDURE — 77063 MAMMO DIGITAL SCREENING BILAT WITH TOMO: ICD-10-PCS | Mod: 26,,, | Performed by: RADIOLOGY

## 2021-06-01 PROCEDURE — 3074F SYST BP LT 130 MM HG: CPT | Mod: CPTII,S$GLB,, | Performed by: FAMILY MEDICINE

## 2021-06-01 PROCEDURE — 77067 MAMMO DIGITAL SCREENING BILAT WITH TOMO: ICD-10-PCS | Mod: 26,,, | Performed by: RADIOLOGY

## 2021-06-01 PROCEDURE — 99213 OFFICE O/P EST LOW 20 MIN: CPT | Mod: S$GLB,,, | Performed by: FAMILY MEDICINE

## 2021-06-01 PROCEDURE — 3008F BODY MASS INDEX DOCD: CPT | Mod: CPTII,S$GLB,, | Performed by: FAMILY MEDICINE

## 2021-06-01 PROCEDURE — 3074F PR MOST RECENT SYSTOLIC BLOOD PRESSURE < 130 MM HG: ICD-10-PCS | Mod: CPTII,S$GLB,, | Performed by: FAMILY MEDICINE

## 2021-06-01 PROCEDURE — 99999 PR PBB SHADOW E&M-EST. PATIENT-LVL IV: CPT | Mod: PBBFAC,,, | Performed by: FAMILY MEDICINE

## 2021-06-01 PROCEDURE — 72110 XR LUMBAR SPINE COMPLETE 5 VIEW: ICD-10-PCS | Mod: 26,,, | Performed by: RADIOLOGY

## 2021-06-01 PROCEDURE — 72110 X-RAY EXAM L-2 SPINE 4/>VWS: CPT | Mod: TC

## 2021-06-01 PROCEDURE — 3044F PR MOST RECENT HEMOGLOBIN A1C LEVEL <7.0%: ICD-10-PCS | Mod: CPTII,S$GLB,, | Performed by: FAMILY MEDICINE

## 2021-06-01 RX ORDER — MELOXICAM 15 MG/1
15 TABLET ORAL DAILY PRN
Qty: 30 TABLET | Refills: 0 | Status: SHIPPED | OUTPATIENT
Start: 2021-06-01 | End: 2022-04-13

## 2021-06-02 ENCOUNTER — PATIENT MESSAGE (OUTPATIENT)
Dept: BARIATRICS | Facility: CLINIC | Age: 41
End: 2021-06-02

## 2021-06-02 ENCOUNTER — TELEPHONE (OUTPATIENT)
Dept: BARIATRICS | Facility: CLINIC | Age: 41
End: 2021-06-02

## 2021-06-15 ENCOUNTER — PATIENT MESSAGE (OUTPATIENT)
Dept: BARIATRICS | Facility: CLINIC | Age: 41
End: 2021-06-15

## 2021-07-01 ENCOUNTER — TELEPHONE (OUTPATIENT)
Dept: RADIOLOGY | Facility: HOSPITAL | Age: 41
End: 2021-07-01

## 2021-07-01 ENCOUNTER — OFFICE VISIT (OUTPATIENT)
Dept: OBSTETRICS AND GYNECOLOGY | Facility: CLINIC | Age: 41
End: 2021-07-01
Payer: COMMERCIAL

## 2021-07-01 VITALS
SYSTOLIC BLOOD PRESSURE: 138 MMHG | WEIGHT: 274.94 LBS | HEIGHT: 62 IN | BODY MASS INDEX: 50.59 KG/M2 | DIASTOLIC BLOOD PRESSURE: 94 MMHG

## 2021-07-01 DIAGNOSIS — Z01.419 ENCOUNTER FOR GYNECOLOGICAL EXAMINATION WITHOUT ABNORMAL FINDING: Primary | ICD-10-CM

## 2021-07-01 DIAGNOSIS — N92.0 MENORRHAGIA WITH REGULAR CYCLE: ICD-10-CM

## 2021-07-01 DIAGNOSIS — D21.9 FIBROID: ICD-10-CM

## 2021-07-01 PROCEDURE — 99396 PR PREVENTIVE VISIT,EST,40-64: ICD-10-PCS | Mod: S$PBB,,, | Performed by: NURSE PRACTITIONER

## 2021-07-01 PROCEDURE — 99999 PR PBB SHADOW E&M-EST. PATIENT-LVL III: ICD-10-PCS | Mod: PBBFAC,,, | Performed by: NURSE PRACTITIONER

## 2021-07-01 PROCEDURE — 99999 PR PBB SHADOW E&M-EST. PATIENT-LVL III: CPT | Mod: PBBFAC,,, | Performed by: NURSE PRACTITIONER

## 2021-07-01 PROCEDURE — 88175 CYTOPATH C/V AUTO FLUID REDO: CPT | Performed by: NURSE PRACTITIONER

## 2021-07-01 PROCEDURE — 87624 HPV HI-RISK TYP POOLED RSLT: CPT | Performed by: NURSE PRACTITIONER

## 2021-07-01 PROCEDURE — 99396 PREV VISIT EST AGE 40-64: CPT | Mod: S$PBB,,, | Performed by: NURSE PRACTITIONER

## 2021-07-02 ENCOUNTER — TELEPHONE (OUTPATIENT)
Dept: RADIOLOGY | Facility: HOSPITAL | Age: 41
End: 2021-07-02

## 2021-07-07 ENCOUNTER — TELEPHONE (OUTPATIENT)
Dept: RADIOLOGY | Facility: HOSPITAL | Age: 41
End: 2021-07-07

## 2021-07-08 LAB
FINAL PATHOLOGIC DIAGNOSIS: NORMAL
Lab: NORMAL

## 2021-07-09 LAB
HPV HR 12 DNA SPEC QL NAA+PROBE: NEGATIVE
HPV16 AG SPEC QL: NEGATIVE
HPV18 DNA SPEC QL NAA+PROBE: NEGATIVE

## 2021-07-14 ENCOUNTER — TELEPHONE (OUTPATIENT)
Dept: RADIOLOGY | Facility: HOSPITAL | Age: 41
End: 2021-07-14

## 2021-07-15 ENCOUNTER — PATIENT MESSAGE (OUTPATIENT)
Dept: OBSTETRICS AND GYNECOLOGY | Facility: CLINIC | Age: 41
End: 2021-07-15

## 2021-07-15 ENCOUNTER — HOSPITAL ENCOUNTER (OUTPATIENT)
Dept: RADIOLOGY | Facility: HOSPITAL | Age: 41
Discharge: HOME | End: 2021-07-15
Attending: NURSE PRACTITIONER

## 2021-07-15 DIAGNOSIS — N92.0 MENORRHAGIA WITH REGULAR CYCLE: ICD-10-CM

## 2021-07-15 DIAGNOSIS — D21.9 FIBROID: ICD-10-CM

## 2021-07-15 PROCEDURE — 76856 US EXAM PELVIC COMPLETE: CPT | Mod: 26,,, | Performed by: RADIOLOGY

## 2021-07-15 PROCEDURE — 76830 TRANSVAGINAL US NON-OB: CPT | Mod: 26,,, | Performed by: RADIOLOGY

## 2021-07-15 PROCEDURE — 76830 US PELVIS COMP WITH TRANSVAG NON-OB (XPD): ICD-10-PCS | Mod: 26,,, | Performed by: RADIOLOGY

## 2021-07-15 PROCEDURE — 76856 US EXAM PELVIC COMPLETE: CPT | Mod: TC

## 2021-07-15 PROCEDURE — 76856 US PELVIS COMP WITH TRANSVAG NON-OB (XPD): ICD-10-PCS | Mod: 26,,, | Performed by: RADIOLOGY

## 2021-09-07 ENCOUNTER — TELEPHONE (OUTPATIENT)
Dept: BARIATRICS | Facility: CLINIC | Age: 41
End: 2021-09-07

## 2021-09-08 ENCOUNTER — PATIENT MESSAGE (OUTPATIENT)
Dept: BARIATRICS | Facility: CLINIC | Age: 41
End: 2021-09-08

## 2021-09-28 ENCOUNTER — PATIENT OUTREACH (OUTPATIENT)
Dept: ADMINISTRATIVE | Facility: OTHER | Age: 41
End: 2021-09-28

## 2021-09-28 DIAGNOSIS — E11.9 TYPE 2 DIABETES MELLITUS WITHOUT COMPLICATION, UNSPECIFIED WHETHER LONG TERM INSULIN USE: Primary | ICD-10-CM

## 2021-09-29 ENCOUNTER — OFFICE VISIT (OUTPATIENT)
Dept: OBSTETRICS AND GYNECOLOGY | Facility: CLINIC | Age: 41
End: 2021-09-29
Payer: MEDICAID

## 2021-09-29 VITALS
WEIGHT: 265 LBS | HEIGHT: 62 IN | BODY MASS INDEX: 48.76 KG/M2 | DIASTOLIC BLOOD PRESSURE: 80 MMHG | SYSTOLIC BLOOD PRESSURE: 132 MMHG

## 2021-09-29 DIAGNOSIS — N92.1 MENORRHAGIA WITH IRREGULAR CYCLE: ICD-10-CM

## 2021-09-29 DIAGNOSIS — E66.01 MORBID OBESITY WITH BMI OF 45.0-49.9, ADULT: ICD-10-CM

## 2021-09-29 DIAGNOSIS — Z31.61 PROCREATIVE COUNSELING AND ADVICE USING NATURAL FAMILY PLANNING: ICD-10-CM

## 2021-09-29 DIAGNOSIS — N94.6 DYSMENORRHEA: Primary | ICD-10-CM

## 2021-09-29 DIAGNOSIS — D25.9 UTERINE LEIOMYOMA, UNSPECIFIED LOCATION: ICD-10-CM

## 2021-09-29 PROCEDURE — 99213 OFFICE O/P EST LOW 20 MIN: CPT | Mod: PBBFAC | Performed by: OBSTETRICS & GYNECOLOGY

## 2021-09-29 PROCEDURE — 99213 OFFICE O/P EST LOW 20 MIN: CPT | Mod: S$PBB,,, | Performed by: OBSTETRICS & GYNECOLOGY

## 2021-09-29 PROCEDURE — 99213 PR OFFICE/OUTPT VISIT, EST, LEVL III, 20-29 MIN: ICD-10-PCS | Mod: S$PBB,,, | Performed by: OBSTETRICS & GYNECOLOGY

## 2021-09-29 PROCEDURE — 99999 PR PBB SHADOW E&M-EST. PATIENT-LVL III: CPT | Mod: PBBFAC,,, | Performed by: OBSTETRICS & GYNECOLOGY

## 2021-09-29 PROCEDURE — 99999 PR PBB SHADOW E&M-EST. PATIENT-LVL III: ICD-10-PCS | Mod: PBBFAC,,, | Performed by: OBSTETRICS & GYNECOLOGY

## 2021-09-29 RX ORDER — TRANEXAMIC ACID 650 MG/1
1300 TABLET ORAL 3 TIMES DAILY
Qty: 180 TABLET | Refills: 5 | Status: SHIPPED | OUTPATIENT
Start: 2021-09-29 | End: 2022-03-28

## 2022-01-14 ENCOUNTER — PATIENT MESSAGE (OUTPATIENT)
Dept: ADMINISTRATIVE | Facility: HOSPITAL | Age: 42
End: 2022-01-14
Payer: MEDICAID

## 2022-02-01 ENCOUNTER — DOCUMENTATION ONLY (OUTPATIENT)
Dept: BARIATRICS | Facility: CLINIC | Age: 42
End: 2022-02-01
Payer: MEDICAID

## 2022-02-01 NOTE — PROGRESS NOTES
Bariatric dashboard updated from [facility] Scheduled to Patient ended workup.  Planned Sleeve cancelled due to Medicaid coverage and previous insurance not having bariatric surgery as a covered benefit.

## 2022-04-13 ENCOUNTER — OFFICE VISIT (OUTPATIENT)
Dept: INTERNAL MEDICINE | Facility: CLINIC | Age: 42
End: 2022-04-13
Payer: MEDICAID

## 2022-04-13 VITALS
DIASTOLIC BLOOD PRESSURE: 88 MMHG | RESPIRATION RATE: 18 BRPM | TEMPERATURE: 98 F | BODY MASS INDEX: 46.41 KG/M2 | SYSTOLIC BLOOD PRESSURE: 138 MMHG | OXYGEN SATURATION: 95 % | HEIGHT: 62 IN | HEART RATE: 96 BPM | WEIGHT: 252.19 LBS

## 2022-04-13 DIAGNOSIS — E11.9 CONTROLLED TYPE 2 DIABETES MELLITUS WITHOUT COMPLICATION, WITHOUT LONG-TERM CURRENT USE OF INSULIN: ICD-10-CM

## 2022-04-13 DIAGNOSIS — I10 HYPERTENSION GOAL BP (BLOOD PRESSURE) < 130/80: Primary | ICD-10-CM

## 2022-04-13 DIAGNOSIS — E66.01 MORBID OBESITY WITH BMI OF 45.0-49.9, ADULT: ICD-10-CM

## 2022-04-13 DIAGNOSIS — E78.5 HYPERLIPIDEMIA LDL GOAL <70: ICD-10-CM

## 2022-04-13 PROCEDURE — 3075F SYST BP GE 130 - 139MM HG: CPT | Mod: CPTII,,, | Performed by: INTERNAL MEDICINE

## 2022-04-13 PROCEDURE — 99999 PR PBB SHADOW E&M-EST. PATIENT-LVL IV: CPT | Mod: PBBFAC,,, | Performed by: INTERNAL MEDICINE

## 2022-04-13 PROCEDURE — 4010F PR ACE/ARB THEARPY RXD/TAKEN: ICD-10-PCS | Mod: CPTII,,, | Performed by: INTERNAL MEDICINE

## 2022-04-13 PROCEDURE — 3079F DIAST BP 80-89 MM HG: CPT | Mod: CPTII,,, | Performed by: INTERNAL MEDICINE

## 2022-04-13 PROCEDURE — 1160F PR REVIEW ALL MEDS BY PRESCRIBER/CLIN PHARMACIST DOCUMENTED: ICD-10-PCS | Mod: CPTII,,, | Performed by: INTERNAL MEDICINE

## 2022-04-13 PROCEDURE — 1159F PR MEDICATION LIST DOCUMENTED IN MEDICAL RECORD: ICD-10-PCS | Mod: CPTII,,, | Performed by: INTERNAL MEDICINE

## 2022-04-13 PROCEDURE — 3079F PR MOST RECENT DIASTOLIC BLOOD PRESSURE 80-89 MM HG: ICD-10-PCS | Mod: CPTII,,, | Performed by: INTERNAL MEDICINE

## 2022-04-13 PROCEDURE — 99214 OFFICE O/P EST MOD 30 MIN: CPT | Mod: S$PBB,,, | Performed by: INTERNAL MEDICINE

## 2022-04-13 PROCEDURE — 99214 OFFICE O/P EST MOD 30 MIN: CPT | Mod: PBBFAC | Performed by: INTERNAL MEDICINE

## 2022-04-13 PROCEDURE — 3008F BODY MASS INDEX DOCD: CPT | Mod: CPTII,,, | Performed by: INTERNAL MEDICINE

## 2022-04-13 PROCEDURE — 3008F PR BODY MASS INDEX (BMI) DOCUMENTED: ICD-10-PCS | Mod: CPTII,,, | Performed by: INTERNAL MEDICINE

## 2022-04-13 PROCEDURE — 1160F RVW MEDS BY RX/DR IN RCRD: CPT | Mod: CPTII,,, | Performed by: INTERNAL MEDICINE

## 2022-04-13 PROCEDURE — 3075F PR MOST RECENT SYSTOLIC BLOOD PRESS GE 130-139MM HG: ICD-10-PCS | Mod: CPTII,,, | Performed by: INTERNAL MEDICINE

## 2022-04-13 PROCEDURE — 99999 PR PBB SHADOW E&M-EST. PATIENT-LVL IV: ICD-10-PCS | Mod: PBBFAC,,, | Performed by: INTERNAL MEDICINE

## 2022-04-13 PROCEDURE — 1159F MED LIST DOCD IN RCRD: CPT | Mod: CPTII,,, | Performed by: INTERNAL MEDICINE

## 2022-04-13 PROCEDURE — 4010F ACE/ARB THERAPY RXD/TAKEN: CPT | Mod: CPTII,,, | Performed by: INTERNAL MEDICINE

## 2022-04-13 PROCEDURE — 99214 PR OFFICE/OUTPT VISIT, EST, LEVL IV, 30-39 MIN: ICD-10-PCS | Mod: S$PBB,,, | Performed by: INTERNAL MEDICINE

## 2022-04-13 RX ORDER — PEN NEEDLE, DIABETIC 30 GX3/16"
NEEDLE, DISPOSABLE MISCELLANEOUS
Qty: 150 EACH | Refills: 11 | Status: SHIPPED | OUTPATIENT
Start: 2022-04-13 | End: 2022-06-17 | Stop reason: SDUPTHER

## 2022-04-13 RX ORDER — FLASH GLUCOSE SENSOR
1 KIT MISCELLANEOUS
Qty: 2 KIT | Refills: 11 | Status: SHIPPED | OUTPATIENT
Start: 2022-04-13 | End: 2022-06-26 | Stop reason: SDUPTHER

## 2022-04-13 RX ORDER — PIOGLITAZONEHYDROCHLORIDE 15 MG/1
15 TABLET ORAL
COMMUNITY
End: 2022-04-13 | Stop reason: SDUPTHER

## 2022-04-13 RX ORDER — PIOGLITAZONEHYDROCHLORIDE 15 MG/1
15 TABLET ORAL DAILY
Qty: 90 TABLET | Refills: 1 | Status: SHIPPED | OUTPATIENT
Start: 2022-04-13 | End: 2023-06-01 | Stop reason: SDUPTHER

## 2022-04-13 RX ORDER — IBUPROFEN 600 MG/1
600 TABLET ORAL EVERY 6 HOURS PRN
COMMUNITY
Start: 2022-02-01

## 2022-04-13 RX ORDER — TRAMADOL HYDROCHLORIDE 50 MG/1
50 TABLET ORAL EVERY 6 HOURS PRN
COMMUNITY
Start: 2022-02-01 | End: 2024-03-21

## 2022-04-13 NOTE — PROGRESS NOTES
"Subjective:       Patient ID: Maikol Pacheco is a 41 y.o. female.    Chief Complaint: Establish Care    HPI  Review of Systems      Objective:      Physical Exam    Assessment:       Problem List Items Addressed This Visit     Morbid obesity with BMI of 45.0-49.9, adult      Other Visit Diagnoses     Hypertension goal BP (blood pressure) < 130/80    -  Primary    Relevant Orders    Comprehensive Metabolic Panel    Lipid Panel    Controlled type 2 diabetes mellitus without complication, without long-term current use of insulin        Relevant Medications    pioglitazone (ACTOS) 15 MG tablet    flash glucose sensor (FREESTYLE VASILE 14 DAY SENSOR) Kit    pen needle, diabetic (BD ULTRA-FINE MINI PEN NEEDLE) 31 gauge x 3/16" Ndle    Other Relevant Orders    Hemoglobin A1C    Comprehensive Metabolic Panel    Lipid Panel    Microalbumin/Creatinine Ratio, Urine    Hyperlipidemia LDL goal <70        Relevant Orders    Comprehensive Metabolic Panel    Lipid Panel          Plan:       ***      "

## 2022-04-13 NOTE — PROGRESS NOTES
Subjective:       Patient ID: Maikol Pacheco is a 41 y.o. female.    Chief Complaint: Establish Care    41 y.o. Black or  female     Patient presents with:  Establish Care    HPI: Here today to establish care. She is new to me. She was previously a patient of Dr. Maisha Bartholomew.   HTN--initially elevated, but improved when rechecked. She reports compliance with losartan. She admits to diet non compliance. She is under stress and grieving the loss of her aunt.   DM--compliant with pioglitazone, Trulicity and insulin. She would like to switch from Trulicity to Ozempic. She is overdue for labs.       She needs refills on pioglitazone, pen needles and Freestyle dima sensors.              HGBA1C                   6.4 (H)             04/16/2021            HLD--compliant with simvastatin.               LDLCALC                  84.6                04/16/2021                    CHOL                     143                 04/16/2021                 TRIG                     47                  04/16/2021                 HDL                      49                  04/16/2021                 ALT                      9 (L)               04/16/2021                 AST                      12                  04/16/2021                 NA                       138                 04/16/2021                 K                        4.1                 04/16/2021                 CL                       103                 04/16/2021                 CREATININE               0.7                 04/16/2021                 BUN                      14                  04/16/2021                 CO2                      26                  04/16/2021                 TSH                      0.842               04/16/2021              Past Medical History:  Allergy  Anemia  Anxiety  Depression  2008: Diabetes mellitus type II      Comment:  BS- 400's last week  Gastroparesis  Hyperlipidemia  Hypertension  Morbid  obesity  Neuromuscular disorder  6/1/2021: Osteoarthritis of spine with radiculopathy, lumbar region    Past Surgical History:  ADENOIDECTOMY  10/17/2019: CARPAL TUNNEL RELEASE; Left      Comment:  Procedure: RELEASE, CARPAL TUNNEL;  Surgeon: Francisco Aguilar MD;  Location: [facility];  Service:                Orthopedics;  Laterality: Left;  gastric balloon      Comment:  placement and removal via endoscopy   TONSILLECTOMY    Review of patient's family history indicates:  Problem: Hyperlipidemia      Relation: Mother          Age of Onset: (Not Specified)  Problem: Diabetes      Relation: Maternal Grandfather          Age of Onset: (Not Specified)  Problem: Cancer      Relation: Maternal Aunt          Age of Onset: (Not Specified)          Comment: breast  Problem: Diabetes      Relation: Maternal Grandmother          Age of Onset: (Not Specified)  Problem: Stroke      Relation: Maternal Grandmother          Age of Onset: (Not Specified)  Problem: Hypertension      Relation: Other          Age of Onset: (Not Specified)      Current Outpatient Medications on File Prior to Visit:  blood sugar diagnostic Strp, 1 strip by Misc.(Non-Drug; Combo Route) route 2 (two) times daily. Test strips covered by insurance, Disp: 100 strip, Rfl: 1  blood-glucose meter kit, Blood glucose Meter kit covered by insurance. Use as instructed, Disp: 1 each, Rfl: 0  dulaglutide (TRULICITY) 1.5 mg/0.5 mL pen injector, Inject 1.5 mg into the skin every 7 days., Disp: 4 pen, Rfl: 3  flash glucose scanning reader (FREESTYLE VASILE 14 DAY READER) Misc, 1 each by Misc.(Non-Drug; Combo Route) route once daily., Disp: 1 each, Rfl: 0  insulin aspart U-100 (NOVOLOG FLEXPEN U-100 INSULIN) 100 unit/mL (3 mL) InPn pen, TITRATE UP TO 26 UNITS UNDER THE SKIN 10-15 MIN BEFORE MEALS 3 TIMES A DAY AS DIRECTED, Disp: 15 mL, Rfl: 3  insulin detemir U-100 (LEVEMIR FLEXTOUCH U-100 INSULN) 100 unit/mL (3 mL) InPn pen, INJECT 24 UNITS INTO THE  "SKIN 2 (TWO) TIMES DAILY., Disp: 15 mL, Rfl: 11  lancets 33 gauge Misc, 1 lancet by Misc.(Non-Drug; Combo Route) route 2 (two) times daily. Covered by insurance, Disp: 100 each, Rfl: 1  losartan (COZAAR) 50 MG tablet, Take 1 tablet (50 mg total) by mouth once daily., Disp: 90 tablet, Rfl: 3  simvastatin (ZOCOR) 40 MG tablet, Take 1 tablet (40 mg total) by mouth every evening., Disp: 90 tablet, Rfl: 1  flash glucose sensor (FREESTYLE VASILE 14 DAY SENSOR) Kit, 1 each by Misc.(Non-Drug; Combo Route) route every 14 (fourteen) days., Disp: 2 kit, Rfl: 11  pen needle, diabetic (BD ULTRA-FINE MINI PEN NEEDLE) 31 gauge x 3/16" Ndle, AS DIRECTED, Disp: 100 each, Rfl: 11  ibuprofen (ADVIL,MOTRIN) 600 MG tablet, Take 600 mg by mouth every 6 (six) hours as needed., Disp: , Rfl:   traMADoL (ULTRAM) 50 mg tablet, Take 50 mg by mouth every 6 (six) hours as needed., Disp: , Rfl:   tranexamic acid (LYSTEDA ORAL), Lysteda Take No date recorded No form recorded No frequency recorded No route recorded No set duration recorded No set duration amount recorded active No dosage strength recorded No dosage strength units of measure recorded, Disp: , Rfl:   pioglitazone (ACTOS) 15 MG tablet, Take 1 tablet (15 mg total) by mouth once daily., Disp: 90 tablet, Rfl: 1    Allergies:   Review of patient's allergies indicates:   -- Clindamycin -- Hives, Itching and Rash            Review of Systems   Constitutional: Negative for fever and unexpected weight change.   Respiratory: Negative for shortness of breath.    Cardiovascular: Positive for leg swelling (improving). Negative for chest pain.   Gastrointestinal: Negative for abdominal pain.   Genitourinary: Negative for difficulty urinating and frequency.   Musculoskeletal: Negative for gait problem.   Neurological: Negative for dizziness, numbness and headaches.   Psychiatric/Behavioral: Positive for dysphoric mood.         Objective:      Physical Exam  Vitals reviewed.   Constitutional:       " "General: She is not in acute distress.     Appearance: She is well-developed. She is not ill-appearing.   Eyes:      General: No scleral icterus.  Cardiovascular:      Rate and Rhythm: Normal rate and regular rhythm.      Pulses:           Dorsalis pedis pulses are 2+ on the right side and 2+ on the left side.      Heart sounds: Normal heart sounds.   Pulmonary:      Effort: Pulmonary effort is normal. No respiratory distress.      Breath sounds: Normal breath sounds. No wheezing or rales.   Abdominal:      General: Bowel sounds are normal.      Palpations: Abdomen is soft.   Musculoskeletal:      Right lower leg: Edema (trace) present.      Left lower leg: Edema (trace) present.   Feet:      Right foot:      Protective Sensation: 5 sites tested. 5 sites sensed.      Skin integrity: Callus present. No ulcer.      Left foot:      Protective Sensation: 5 sites tested. 5 sites sensed.      Skin integrity: Callus present. No ulcer.   Skin:     General: Skin is warm and dry.   Neurological:      Mental Status: She is alert and oriented to person, place, and time.   Psychiatric:         Behavior: Behavior normal.         Assessment:       Problem List Items Addressed This Visit     Morbid obesity with BMI of 45.0-49.9, adult      Other Visit Diagnoses     Hypertension goal BP (blood pressure) < 130/80    -  Primary    Relevant Orders    Comprehensive Metabolic Panel    Lipid Panel    Controlled type 2 diabetes mellitus without complication, without long-term current use of insulin        Relevant Medications    pioglitazone (ACTOS) 15 MG tablet    flash glucose sensor (FREESTYLE VASILE 14 DAY SENSOR) Kit    pen needle, diabetic (BD ULTRA-FINE MINI PEN NEEDLE) 31 gauge x 3/16" Ndle    Other Relevant Orders    Hemoglobin A1C    Comprehensive Metabolic Panel    Lipid Panel    Microalbumin/Creatinine Ratio, Urine    Hyperlipidemia LDL goal <70        Relevant Orders    Comprehensive Metabolic Panel    Lipid Panel        " "  Plan:       Maikol was seen today for establish care.    Diagnoses and all orders for this visit:    Hypertension goal BP (blood pressure) < 130/80  -     Continue losartan  -     Comprehensive Metabolic Panel; Standing  -     Lipid Panel; Standing    Controlled type 2 diabetes mellitus without complication, without long-term current use of insulin  -     Hemoglobin A1C; Standing  -     Comprehensive Metabolic Panel; Standing  -     Lipid Panel; Standing  -     Microalbumin/Creatinine Ratio, Urine; Future  -     Refill pioglitazone (ACTOS) 15 MG tablet; Take 1 tablet (15 mg total) by mouth once daily.  -     Refill flash glucose sensor (FREESTYLE VASILE 14 DAY SENSOR) Kit; 1 each by Misc.(Non-Drug; Combo Route) route every 14 (fourteen) days.  -     Refill pen needle, diabetic (BD ULTRA-FINE MINI PEN NEEDLE) 31 gauge x 3/16" Ndle; Use five times daily with insulin injections.    Hyperlipidemia LDL goal <70  -     Comprehensive Metabolic Panel; Standing  -     Lipid Panel; Standing    Morbid obesity with BMI of 45.0-49.9, adult  -     Lifestyle modifications discussed    Schedule labs.            "

## 2022-04-17 ENCOUNTER — PATIENT MESSAGE (OUTPATIENT)
Dept: ADMINISTRATIVE | Facility: HOSPITAL | Age: 42
End: 2022-04-17
Payer: MEDICAID

## 2022-04-20 ENCOUNTER — LAB VISIT (OUTPATIENT)
Dept: LAB | Facility: HOSPITAL | Age: 42
End: 2022-04-20
Attending: INTERNAL MEDICINE
Payer: MEDICAID

## 2022-04-20 DIAGNOSIS — E11.9 CONTROLLED TYPE 2 DIABETES MELLITUS WITHOUT COMPLICATION, WITHOUT LONG-TERM CURRENT USE OF INSULIN: ICD-10-CM

## 2022-04-20 DIAGNOSIS — E78.5 HYPERLIPIDEMIA LDL GOAL <70: ICD-10-CM

## 2022-04-20 DIAGNOSIS — I10 HYPERTENSION GOAL BP (BLOOD PRESSURE) < 130/80: ICD-10-CM

## 2022-04-20 LAB
ALBUMIN SERPL BCP-MCNC: 3 G/DL (ref 3.5–5.2)
ALBUMIN/CREAT UR: 16.5 UG/MG (ref 0–30)
ALP SERPL-CCNC: 92 U/L (ref 55–135)
ALT SERPL W/O P-5'-P-CCNC: 12 U/L (ref 10–44)
ANION GAP SERPL CALC-SCNC: 12 MMOL/L (ref 8–16)
AST SERPL-CCNC: 12 U/L (ref 10–40)
BILIRUB SERPL-MCNC: 0.3 MG/DL (ref 0.1–1)
BUN SERPL-MCNC: 11 MG/DL (ref 6–20)
CALCIUM SERPL-MCNC: 8.9 MG/DL (ref 8.7–10.5)
CHLORIDE SERPL-SCNC: 99 MMOL/L (ref 95–110)
CHOLEST SERPL-MCNC: 187 MG/DL (ref 120–199)
CHOLEST/HDLC SERPL: 4 {RATIO} (ref 2–5)
CO2 SERPL-SCNC: 23 MMOL/L (ref 23–29)
CREAT SERPL-MCNC: 0.7 MG/DL (ref 0.5–1.4)
CREAT UR-MCNC: 91 MG/DL (ref 15–325)
EST. GFR  (AFRICAN AMERICAN): >60 ML/MIN/1.73 M^2
EST. GFR  (NON AFRICAN AMERICAN): >60 ML/MIN/1.73 M^2
ESTIMATED AVG GLUCOSE: 355 MG/DL (ref 68–131)
GLUCOSE SERPL-MCNC: 323 MG/DL (ref 70–110)
HBA1C MFR BLD: 14 % (ref 4–5.6)
HDLC SERPL-MCNC: 47 MG/DL (ref 40–75)
HDLC SERPL: 25.1 % (ref 20–50)
LDLC SERPL CALC-MCNC: 121.4 MG/DL (ref 63–159)
MICROALBUMIN UR DL<=1MG/L-MCNC: 15 UG/ML
NONHDLC SERPL-MCNC: 140 MG/DL
POTASSIUM SERPL-SCNC: 4.1 MMOL/L (ref 3.5–5.1)
PROT SERPL-MCNC: 7.4 G/DL (ref 6–8.4)
SODIUM SERPL-SCNC: 134 MMOL/L (ref 136–145)
TRIGL SERPL-MCNC: 93 MG/DL (ref 30–150)

## 2022-04-20 PROCEDURE — 80061 LIPID PANEL: CPT | Performed by: INTERNAL MEDICINE

## 2022-04-20 PROCEDURE — 80053 COMPREHEN METABOLIC PANEL: CPT | Performed by: INTERNAL MEDICINE

## 2022-04-20 PROCEDURE — 36415 COLL VENOUS BLD VENIPUNCTURE: CPT | Performed by: INTERNAL MEDICINE

## 2022-04-20 PROCEDURE — 82570 ASSAY OF URINE CREATININE: CPT | Performed by: INTERNAL MEDICINE

## 2022-04-20 PROCEDURE — 83036 HEMOGLOBIN GLYCOSYLATED A1C: CPT | Performed by: INTERNAL MEDICINE

## 2022-04-20 PROCEDURE — 82043 UR ALBUMIN QUANTITATIVE: CPT | Performed by: INTERNAL MEDICINE

## 2022-04-26 ENCOUNTER — PATIENT MESSAGE (OUTPATIENT)
Dept: ADMINISTRATIVE | Facility: HOSPITAL | Age: 42
End: 2022-04-26
Payer: MEDICAID

## 2022-04-26 DIAGNOSIS — E11.69 HYPERLIPIDEMIA ASSOCIATED WITH TYPE 2 DIABETES MELLITUS: Chronic | ICD-10-CM

## 2022-04-26 DIAGNOSIS — E78.5 HYPERLIPIDEMIA ASSOCIATED WITH TYPE 2 DIABETES MELLITUS: Chronic | ICD-10-CM

## 2022-04-26 RX ORDER — SIMVASTATIN 40 MG/1
40 TABLET, FILM COATED ORAL NIGHTLY
Qty: 90 TABLET | Refills: 1 | Status: CANCELLED | OUTPATIENT
Start: 2022-04-26 | End: 2023-07-03

## 2022-04-26 NOTE — TELEPHONE ENCOUNTER
No new care gaps identified.  Powered by [employer]. Reference number: [id number].   4/26/2022 3:38:37 PM CDT

## 2022-06-15 DIAGNOSIS — Z12.31 OTHER SCREENING MAMMOGRAM: ICD-10-CM

## 2022-06-17 NOTE — TELEPHONE ENCOUNTER
Refill Routing Note   Medication(s) are not appropriate for processing by [facility] for the following reason(s):      - Medication not previously prescribed by PCP    [facility] action(s):  Defer          Medication reconciliation completed: No     Appointments  past 12m or future 3m with PCP    Date Provider   Last Visit   4/13/2022 Zari Lim, DO   Next Visit   6/17/2022 Zari Lim, DO   ED visits in past 90 days: 0        Note composed:2:08 PM 06/17/2022

## 2022-06-17 NOTE — TELEPHONE ENCOUNTER
No new care gaps identified.  [facility] Embedded Care Gaps. Reference number: [id number]. 6/17/2022   12:17:21 PM CDT

## 2022-06-20 RX ORDER — INSULIN ASPART 100 [IU]/ML
INJECTION, SOLUTION INTRAVENOUS; SUBCUTANEOUS
Qty: 75 ML | Refills: 1 | Status: SHIPPED | OUTPATIENT
Start: 2022-06-20 | End: 2023-06-01 | Stop reason: SDUPTHER

## 2022-08-03 ENCOUNTER — OFFICE VISIT (OUTPATIENT)
Dept: INTERNAL MEDICINE | Facility: CLINIC | Age: 42
End: 2022-08-03
Payer: COMMERCIAL

## 2022-08-03 VITALS
HEIGHT: 62 IN | TEMPERATURE: 99 F | OXYGEN SATURATION: 95 % | RESPIRATION RATE: 18 BRPM | HEART RATE: 109 BPM | SYSTOLIC BLOOD PRESSURE: 134 MMHG | BODY MASS INDEX: 44.99 KG/M2 | DIASTOLIC BLOOD PRESSURE: 78 MMHG | WEIGHT: 244.5 LBS

## 2022-08-03 DIAGNOSIS — E66.01 MORBID OBESITY WITH BMI OF 40.0-44.9, ADULT: ICD-10-CM

## 2022-08-03 DIAGNOSIS — Z79.4 UNCONTROLLED TYPE 2 DIABETES MELLITUS WITH HYPERGLYCEMIA, WITH LONG-TERM CURRENT USE OF INSULIN: Primary | ICD-10-CM

## 2022-08-03 DIAGNOSIS — Z23 IMMUNIZATION DUE: ICD-10-CM

## 2022-08-03 DIAGNOSIS — I10 HYPERTENSION GOAL BP (BLOOD PRESSURE) < 130/80: ICD-10-CM

## 2022-08-03 DIAGNOSIS — E11.65 UNCONTROLLED TYPE 2 DIABETES MELLITUS WITH HYPERGLYCEMIA, WITH LONG-TERM CURRENT USE OF INSULIN: Primary | ICD-10-CM

## 2022-08-03 DIAGNOSIS — E78.5 HYPERLIPIDEMIA LDL GOAL <70: ICD-10-CM

## 2022-08-03 PROCEDURE — 3075F PR MOST RECENT SYSTOLIC BLOOD PRESS GE 130-139MM HG: ICD-10-PCS | Mod: CPTII,S$GLB,, | Performed by: INTERNAL MEDICINE

## 2022-08-03 PROCEDURE — 99999 PR PBB SHADOW E&M-EST. PATIENT-LVL V: CPT | Mod: PBBFAC,,, | Performed by: INTERNAL MEDICINE

## 2022-08-03 PROCEDURE — 3008F BODY MASS INDEX DOCD: CPT | Mod: CPTII,S$GLB,, | Performed by: INTERNAL MEDICINE

## 2022-08-03 PROCEDURE — 3061F PR NEG MICROALBUMINURIA RESULT DOCUMENTED/REVIEW: ICD-10-PCS | Mod: CPTII,S$GLB,, | Performed by: INTERNAL MEDICINE

## 2022-08-03 PROCEDURE — 4010F PR ACE/ARB THEARPY RXD/TAKEN: ICD-10-PCS | Mod: CPTII,S$GLB,, | Performed by: INTERNAL MEDICINE

## 2022-08-03 PROCEDURE — 99999 PR PBB SHADOW E&M-EST. PATIENT-LVL V: ICD-10-PCS | Mod: PBBFAC,,, | Performed by: INTERNAL MEDICINE

## 2022-08-03 PROCEDURE — 1160F RVW MEDS BY RX/DR IN RCRD: CPT | Mod: CPTII,S$GLB,, | Performed by: INTERNAL MEDICINE

## 2022-08-03 PROCEDURE — 3046F PR MOST RECENT HEMOGLOBIN A1C LEVEL > 9.0%: ICD-10-PCS | Mod: CPTII,S$GLB,, | Performed by: INTERNAL MEDICINE

## 2022-08-03 PROCEDURE — 1160F PR REVIEW ALL MEDS BY PRESCRIBER/CLIN PHARMACIST DOCUMENTED: ICD-10-PCS | Mod: CPTII,S$GLB,, | Performed by: INTERNAL MEDICINE

## 2022-08-03 PROCEDURE — 1159F PR MEDICATION LIST DOCUMENTED IN MEDICAL RECORD: ICD-10-PCS | Mod: CPTII,S$GLB,, | Performed by: INTERNAL MEDICINE

## 2022-08-03 PROCEDURE — 3078F PR MOST RECENT DIASTOLIC BLOOD PRESSURE < 80 MM HG: ICD-10-PCS | Mod: CPTII,S$GLB,, | Performed by: INTERNAL MEDICINE

## 2022-08-03 PROCEDURE — 3008F PR BODY MASS INDEX (BMI) DOCUMENTED: ICD-10-PCS | Mod: CPTII,S$GLB,, | Performed by: INTERNAL MEDICINE

## 2022-08-03 PROCEDURE — 3075F SYST BP GE 130 - 139MM HG: CPT | Mod: CPTII,S$GLB,, | Performed by: INTERNAL MEDICINE

## 2022-08-03 PROCEDURE — 90677 PCV20 VACCINE IM: CPT | Mod: PBBFAC

## 2022-08-03 PROCEDURE — 3078F DIAST BP <80 MM HG: CPT | Mod: CPTII,S$GLB,, | Performed by: INTERNAL MEDICINE

## 2022-08-03 PROCEDURE — 3066F NEPHROPATHY DOC TX: CPT | Mod: CPTII,S$GLB,, | Performed by: INTERNAL MEDICINE

## 2022-08-03 PROCEDURE — 99214 PR OFFICE/OUTPT VISIT, EST, LEVL IV, 30-39 MIN: ICD-10-PCS | Mod: S$GLB,,, | Performed by: INTERNAL MEDICINE

## 2022-08-03 PROCEDURE — 4010F ACE/ARB THERAPY RXD/TAKEN: CPT | Mod: CPTII,S$GLB,, | Performed by: INTERNAL MEDICINE

## 2022-08-03 PROCEDURE — 3061F NEG MICROALBUMINURIA REV: CPT | Mod: CPTII,S$GLB,, | Performed by: INTERNAL MEDICINE

## 2022-08-03 PROCEDURE — 99214 OFFICE O/P EST MOD 30 MIN: CPT | Mod: S$GLB,,, | Performed by: INTERNAL MEDICINE

## 2022-08-03 PROCEDURE — 3046F HEMOGLOBIN A1C LEVEL >9.0%: CPT | Mod: CPTII,S$GLB,, | Performed by: INTERNAL MEDICINE

## 2022-08-03 PROCEDURE — 99215 OFFICE O/P EST HI 40 MIN: CPT | Mod: PBBFAC | Performed by: INTERNAL MEDICINE

## 2022-08-03 PROCEDURE — 3066F PR DOCUMENTATION OF TREATMENT FOR NEPHROPATHY: ICD-10-PCS | Mod: CPTII,S$GLB,, | Performed by: INTERNAL MEDICINE

## 2022-08-03 PROCEDURE — 1159F MED LIST DOCD IN RCRD: CPT | Mod: CPTII,S$GLB,, | Performed by: INTERNAL MEDICINE

## 2022-08-03 RX ORDER — INSULIN DETEMIR 100 [IU]/ML
INJECTION, SOLUTION SUBCUTANEOUS
Qty: 15 ML | Refills: 11 | Status: SHIPPED | OUTPATIENT
Start: 2022-08-03 | End: 2022-12-10 | Stop reason: SDUPTHER

## 2022-08-03 RX ORDER — LOSARTAN POTASSIUM 50 MG/1
50 TABLET ORAL DAILY
Qty: 90 TABLET | Refills: 3 | Status: SHIPPED | OUTPATIENT
Start: 2022-08-03 | End: 2024-03-21 | Stop reason: SDUPTHER

## 2022-08-03 RX ORDER — SIMVASTATIN 40 MG/1
40 TABLET, FILM COATED ORAL NIGHTLY
Qty: 90 TABLET | Refills: 1 | Status: SHIPPED | OUTPATIENT
Start: 2022-08-03

## 2022-08-03 NOTE — PROGRESS NOTES
Maikol Pacheco  41 y.o. Black or  female  [id number]    Chief Complaint:  Chief Complaint   Patient presents with    Diabetes       History of Present Illness:  Presents to the clinic to follow up on diabetes. Her readings have been in the 300's. She is taking pioglitazone, Trulicity, Novolog and Levemir. She is interested in switching to Ozempic.   HTN--stable on losartan. She needs refills.   HLD--compliant with simvastatin. She needs refills.   Lab Results   Component Value Date    LDLCALC 121.4 04/20/2022      CHOL 187 04/20/2022    TRIG 93 04/20/2022    HDL 47 04/20/2022    ALT 12 04/20/2022    AST 12 04/20/2022     (L) 04/20/2022    K 4.1 04/20/2022    CL 99 04/20/2022    CREATININE 0.7 04/20/2022    BUN 11 04/20/2022    CO2 23 04/20/2022    TSH 0.842 04/16/2021    HGBA1C 14.0 (H) 04/20/2022       History:  Past Medical History:   Diagnosis Date    Allergy     Anemia     Anxiety     Depression     Diabetes mellitus type II 2008    BS- 400's last week    Gastroparesis     Hyperlipidemia     Hypertension     Morbid obesity     Neuromuscular disorder     Osteoarthritis of spine with radiculopathy, lumbar region 6/1/2021       Current Outpatient Medications:     blood sugar diagnostic Strp, 1 strip by Misc.(Non-Drug; Combo Route) route 2 (two) times daily. Test strips covered by insurance, Disp: 100 strip, Rfl: 1    blood-glucose meter kit, Blood glucose Meter kit covered by insurance. Use as instructed, Disp: 1 each, Rfl: 0    flash glucose scanning reader (FREESTYLE VASILE 14 DAY READER) Misc, 1 each by Misc.(Non-Drug; Combo Route) route once daily., Disp: 1 each, Rfl: 0    flash glucose sensor (FREESTYLE VASILE 14 DAY SENSOR) Kit, 1 each by Misc.(Non-Drug; Combo Route) route every 14 (fourteen) days., Disp: 2 kit, Rfl: 11    ibuprofen (ADVIL,MOTRIN) 600 MG tablet, Take 600 mg by mouth every 6 (six) hours as needed., Disp: , Rfl:     insulin aspart U-100 (NOVOLOG  "FLEXPEN U-100 INSULIN) 100 unit/mL (3 mL) InPn pen, TITRATE UP TO 26 UNITS UNDER THE SKIN 10-15 MIN BEFORE MEALS 3 TIMES A DAY AS DIRECTED, Disp: 75 mL, Rfl: 1    lancets 33 gauge Misc, 1 lancet by Misc.(Non-Drug; Combo Route) route 2 (two) times daily. Covered by insurance, Disp: 100 each, Rfl: 1    pen needle, diabetic (BD ULTRA-FINE MINI PEN NEEDLE) 31 gauge x 3/16" Ndle, Use five times daily with insulin injections., Disp: 150 each, Rfl: 11    pioglitazone (ACTOS) 15 MG tablet, Take 1 tablet (15 mg total) by mouth once daily., Disp: 90 tablet, Rfl: 1    traMADoL (ULTRAM) 50 mg tablet, Take 50 mg by mouth every 6 (six) hours as needed., Disp: , Rfl:     tranexamic acid (LYSTEDA ORAL), Lysteda Take No date recorded No form recorded No frequency recorded No route recorded No set duration recorded No set duration amount recorded active No dosage strength recorded No dosage strength units of measure recorded, Disp: , Rfl:     insulin detemir U-100 (LEVEMIR FLEXTOUCH U-100 INSULN) 100 unit/mL (3 mL) InPn pen, INJECT 28 UNITS INTO THE SKIN 2 (TWO) TIMES DAILY., Disp: 15 mL, Rfl: 11    losartan (COZAAR) 50 MG tablet, Take 1 tablet (50 mg total) by mouth once daily., Disp: 90 tablet, Rfl: 3    simvastatin (ZOCOR) 40 MG tablet, Take 1 tablet (40 mg total) by mouth every evening., Disp: 90 tablet, Rfl: 1    Allergies:  Review of patient's allergies indicates:   Allergen Reactions    Clindamycin Hives, Itching and Rash       Review of Systems   Constitutional: Negative for weight loss.   Respiratory: Negative for cough and shortness of breath.    Cardiovascular: Negative for chest pain and leg swelling.   Gastrointestinal: Positive for abdominal pain, diarrhea and nausea.        Reports GI symptoms daily when she has to go to work due to anxiety    Neurological: Negative for dizziness and headaches.   Psychiatric/Behavioral: The patient is nervous/anxious.        Exam:  Vitals:    08/03/22 1311   BP: 134/78 "   Pulse: 109   Resp: 18   Temp: 98.5 °F (36.9 °C)     Weight: 110.9 kg (244 lb 7.8 oz)   Body mass index is 44.72 kg/m².      Physical Exam  Vitals reviewed.   Constitutional:       General: She is not in acute distress.     Appearance: She is well-developed. She is obese. She is not ill-appearing.   Eyes:      General: No scleral icterus.  Cardiovascular:      Rate and Rhythm: Normal rate and regular rhythm.      Heart sounds: Normal heart sounds.   Pulmonary:      Effort: Pulmonary effort is normal. No respiratory distress.      Breath sounds: Normal breath sounds. No wheezing or rales.   Abdominal:      General: Bowel sounds are normal.      Palpations: Abdomen is soft.      Tenderness: There is no abdominal tenderness.   Musculoskeletal:      Right lower leg: No edema.      Left lower leg: No edema.   Skin:     General: Skin is warm and dry.   Neurological:      Mental Status: She is alert and oriented to person, place, and time.   Psychiatric:         Behavior: Behavior normal.         Assessment:  The primary encounter diagnosis was Uncontrolled type 2 diabetes mellitus with hyperglycemia, with long-term current use of insulin. Diagnoses of Uncontrolled type 2 diabetes mellitus with diabetic polyneuropathy, with long-term current use of insulin, Hypertension goal BP (blood pressure) < 130/80, Hyperlipidemia LDL goal <70, Morbid obesity with BMI of 40.0-44.9, adult, and Immunization due were also pertinent to this visit.    Plan:  Uncontrolled type 2 diabetes mellitus with hyperglycemia, with long-term current use of insulin  -     Change to semaglutide (OZEMPIC) 0.25 mg or 0.5 mg(2 mg/1.5 mL) pen injector; Inject 0.5 mg into the skin every 7 days.  Dispense: 1 pen; Refill: 0  -     Change insulin detemir U-100 (LEVEMIR FLEXTOUCH U-100 INSULN) 100 unit/mL (3 mL) InPn pen; INJECT 28 UNITS INTO THE SKIN 2 (TWO) TIMES DAILY.  Dispense: 15 mL; Refill: 11  -     Continue Novolog and pioglitazone   -     Lifestyle  modifications discussed  -     Continue glucose monitoring   -     Ambulatory referral/consult to Diabetic Advanced Practice Providers (Medical Management); Future; Expected date: 08/10/2022    Uncontrolled type 2 diabetes mellitus with diabetic polyneuropathy, with long-term current use of insulin  -     Change to semaglutide (OZEMPIC) 0.25 mg or 0.5 mg(2 mg/1.5 mL) pen injector; Inject 0.5 mg into the skin every 7 days.  Dispense: 1 pen; Refill: 0  -     Change insulin detemir U-100 (LEVEMIR FLEXTOUCH U-100 INSULN) 100 unit/mL (3 mL) InPn pen; INJECT 28 UNITS INTO THE SKIN 2 (TWO) TIMES DAILY.  Dispense: 15 mL; Refill: 11  -     Continue Novolog and pioglitazone   -     Lifestyle modifications discussed  -     Continue glucose monitoring -     Ambulatory referral/consult to Diabetic Advanced Practice Providers (Medical Management); Future; Expected date: 08/10/2022    Hypertension goal BP (blood pressure) < 130/80  -     losartan (COZAAR) 50 MG tablet; Take 1 tablet (50 mg total) by mouth once daily.  Dispense: 90 tablet; Refill: 3    Hyperlipidemia LDL goal <70  -     simvastatin (ZOCOR) 40 MG tablet; Take 1 tablet (40 mg total) by mouth every evening.  Dispense: 90 tablet; Refill: 1    Morbid obesity with BMI of 40.0-44.9, adult  -     Lifestyle modifications discussed    Immunization due  -     (In Office Administered) Pneumococcal Conjugate Vaccine (20 Valent) (IM)    Follow up in about 1 month (around 9/3/2022).

## 2022-09-20 ENCOUNTER — TELEPHONE (OUTPATIENT)
Dept: DIABETES | Facility: CLINIC | Age: 42
End: 2022-09-20
Payer: MEDICAID

## 2022-09-20 NOTE — TELEPHONE ENCOUNTER
----- Message from Yahir Tolliver sent at 9/20/2022  8:29 AM CDT -----  Pt is needing a call back to reschedule her appt that is set for this morning. Pt would like an appt on Thursdays or Fridays, Please call .[phone]

## 2022-10-06 ENCOUNTER — PATIENT MESSAGE (OUTPATIENT)
Dept: BARIATRICS | Facility: CLINIC | Age: 42
End: 2022-10-06
Payer: MEDICAID

## 2022-10-18 ENCOUNTER — PATIENT MESSAGE (OUTPATIENT)
Dept: ADMINISTRATIVE | Facility: HOSPITAL | Age: 42
End: 2022-10-18
Payer: MEDICAID

## 2022-11-17 ENCOUNTER — PATIENT OUTREACH (OUTPATIENT)
Dept: ADMINISTRATIVE | Facility: HOSPITAL | Age: 42
End: 2022-11-17
Payer: MEDICAID

## 2022-11-17 NOTE — PROGRESS NOTES
DM REPORT: Working DM report, pt last ofc visit with Dr Lim 08/3/22 scheduled for f/u 1/18/23, Lab appt & DM labs needed already scheduled & linked.

## 2022-12-05 ENCOUNTER — PATIENT OUTREACH (OUTPATIENT)
Dept: ADMINISTRATIVE | Facility: HOSPITAL | Age: 42
End: 2022-12-05
Payer: MEDICAID

## 2022-12-05 DIAGNOSIS — E11.9 TYPE 2 DIABETES MELLITUS WITHOUT COMPLICATION, WITH LONG-TERM CURRENT USE OF INSULIN: Primary | ICD-10-CM

## 2022-12-05 DIAGNOSIS — Z79.4 TYPE 2 DIABETES MELLITUS WITHOUT COMPLICATION, WITH LONG-TERM CURRENT USE OF INSULIN: Primary | ICD-10-CM

## 2022-12-05 NOTE — PROGRESS NOTES
DM  Lab REPORT: Labs verified, Microalbumin ordered & linked, Ha1c already linked to lab scheduled 12/7/22.

## 2022-12-08 LAB
CHOLEST SERPL-MSCNC: 164 MG/DL
HBA1C MFR BLD: 11.9 % (ref 4.3–6.5)
HDLC SERPL-MCNC: 36 MG/DL
LDL CHOLESTEROL DIRECT: 106.16 MG/DL (ref 0–130)
TRIGL SERPL-MCNC: 103 MG/DL (ref 35–175)
VLDLC SERPL-MCNC: 20 MG/DL (ref 0–35)

## 2022-12-13 DIAGNOSIS — E11.65 UNCONTROLLED TYPE 2 DIABETES MELLITUS WITH HYPERGLYCEMIA, WITH LONG-TERM CURRENT USE OF INSULIN: ICD-10-CM

## 2022-12-13 DIAGNOSIS — Z79.4 UNCONTROLLED TYPE 2 DIABETES MELLITUS WITH HYPERGLYCEMIA, WITH LONG-TERM CURRENT USE OF INSULIN: ICD-10-CM

## 2022-12-13 RX ORDER — INSULIN DETEMIR 100 [IU]/ML
INJECTION, SOLUTION SUBCUTANEOUS
Qty: 15 ML | Refills: 11 | OUTPATIENT
Start: 2022-12-13

## 2022-12-13 NOTE — TELEPHONE ENCOUNTER
No new care gaps identified.  [facility] Embedded Care Gaps. Reference number: [id number]. 12/13/2022   8:26:22 AM CST

## 2023-01-18 ENCOUNTER — OFFICE VISIT (OUTPATIENT)
Dept: INTERNAL MEDICINE | Facility: CLINIC | Age: 43
End: 2023-01-18
Payer: COMMERCIAL

## 2023-01-18 VITALS
SYSTOLIC BLOOD PRESSURE: 144 MMHG | DIASTOLIC BLOOD PRESSURE: 98 MMHG | RESPIRATION RATE: 20 BRPM | HEART RATE: 114 BPM | BODY MASS INDEX: 43.94 KG/M2 | OXYGEN SATURATION: 97 % | TEMPERATURE: 99 F | WEIGHT: 238.75 LBS | HEIGHT: 62 IN

## 2023-01-18 DIAGNOSIS — I10 HYPERTENSION GOAL BP (BLOOD PRESSURE) < 130/80: ICD-10-CM

## 2023-01-18 DIAGNOSIS — E66.01 MORBID OBESITY WITH BMI OF 40.0-44.9, ADULT: ICD-10-CM

## 2023-01-18 DIAGNOSIS — Z23 IMMUNIZATION DUE: ICD-10-CM

## 2023-01-18 DIAGNOSIS — Z79.4 UNCONTROLLED TYPE 2 DIABETES MELLITUS WITH HYPERGLYCEMIA, WITH LONG-TERM CURRENT USE OF INSULIN: ICD-10-CM

## 2023-01-18 DIAGNOSIS — E11.65 UNCONTROLLED TYPE 2 DIABETES MELLITUS WITH HYPERGLYCEMIA, WITH LONG-TERM CURRENT USE OF INSULIN: ICD-10-CM

## 2023-01-18 DIAGNOSIS — Z01.818 PREOP EXAMINATION: Primary | ICD-10-CM

## 2023-01-18 PROCEDURE — 99396 PR PREVENTIVE VISIT,EST,40-64: ICD-10-PCS | Mod: S$GLB,,, | Performed by: INTERNAL MEDICINE

## 2023-01-18 PROCEDURE — 1159F MED LIST DOCD IN RCRD: CPT | Mod: CPTII,S$GLB,, | Performed by: INTERNAL MEDICINE

## 2023-01-18 PROCEDURE — 1160F RVW MEDS BY RX/DR IN RCRD: CPT | Mod: CPTII,S$GLB,, | Performed by: INTERNAL MEDICINE

## 2023-01-18 PROCEDURE — 3077F PR MOST RECENT SYSTOLIC BLOOD PRESSURE >= 140 MM HG: ICD-10-PCS | Mod: CPTII,S$GLB,, | Performed by: INTERNAL MEDICINE

## 2023-01-18 PROCEDURE — 3080F DIAST BP >= 90 MM HG: CPT | Mod: CPTII,S$GLB,, | Performed by: INTERNAL MEDICINE

## 2023-01-18 PROCEDURE — 99214 OFFICE O/P EST MOD 30 MIN: CPT | Mod: PBBFAC | Performed by: INTERNAL MEDICINE

## 2023-01-18 PROCEDURE — 1160F PR REVIEW ALL MEDS BY PRESCRIBER/CLIN PHARMACIST DOCUMENTED: ICD-10-PCS | Mod: CPTII,S$GLB,, | Performed by: INTERNAL MEDICINE

## 2023-01-18 PROCEDURE — 3080F PR MOST RECENT DIASTOLIC BLOOD PRESSURE >= 90 MM HG: ICD-10-PCS | Mod: CPTII,S$GLB,, | Performed by: INTERNAL MEDICINE

## 2023-01-18 PROCEDURE — 99396 PREV VISIT EST AGE 40-64: CPT | Mod: S$GLB,,, | Performed by: INTERNAL MEDICINE

## 2023-01-18 PROCEDURE — 3008F PR BODY MASS INDEX (BMI) DOCUMENTED: ICD-10-PCS | Mod: CPTII,S$GLB,, | Performed by: INTERNAL MEDICINE

## 2023-01-18 PROCEDURE — 3008F BODY MASS INDEX DOCD: CPT | Mod: CPTII,S$GLB,, | Performed by: INTERNAL MEDICINE

## 2023-01-18 PROCEDURE — 3077F SYST BP >= 140 MM HG: CPT | Mod: CPTII,S$GLB,, | Performed by: INTERNAL MEDICINE

## 2023-01-18 PROCEDURE — 99999 PR PBB SHADOW E&M-EST. PATIENT-LVL IV: ICD-10-PCS | Mod: PBBFAC,,, | Performed by: INTERNAL MEDICINE

## 2023-01-18 PROCEDURE — 90471 IMMUNIZATION ADMIN: CPT | Mod: PBBFAC

## 2023-01-18 PROCEDURE — 99999 PR PBB SHADOW E&M-EST. PATIENT-LVL IV: CPT | Mod: PBBFAC,,, | Performed by: INTERNAL MEDICINE

## 2023-01-18 PROCEDURE — 1159F PR MEDICATION LIST DOCUMENTED IN MEDICAL RECORD: ICD-10-PCS | Mod: CPTII,S$GLB,, | Performed by: INTERNAL MEDICINE

## 2023-01-18 RX ORDER — SEMAGLUTIDE 1.34 MG/ML
0.5 INJECTION, SOLUTION SUBCUTANEOUS WEEKLY
Qty: 1 PEN | Refills: 2 | Status: SHIPPED | OUTPATIENT
Start: 2023-01-18 | End: 2023-05-11

## 2023-01-18 NOTE — PROGRESS NOTES
Subjective:       Patient ID: Maikol Pacheco is a 42 y.o. female.    Chief Complaint: Pre-op Exam    Maikol Pacheco  42 y.o. Black or  female     Patient presents with:  Pre-op Exam    HPI: Here today for a preoperative evaluation. She is preparing to have a gastric sleeve procedure.   She has had her preoperative labs and EKG.   She denies prior history of surgical or anesthetic complications.  DM--uncontrolled. Her A1C is 11.9.  She has been out of semaglutide. . She has been compliant with pioglitazone and insulin.   HTN--slightly elevated. She has been compliant with her medication but took it late today.     Past Medical History:  Allergy  Anemia  Anxiety  Depression  2008: Diabetes mellitus type II  Gastroparesis  Hyperlipidemia  Hypertension  No date: Morbid obesity  Neuromuscular disorder  6/1/2021: Osteoarthritis of spine with radiculopathy, lumbar region    Past Surgical History:  ADENOIDECTOMY  10/17/2019: CARPAL TUNNEL RELEASE; Left      Comment:  Procedure: RELEASE, CARPAL TUNNEL;  Surgeon: Francisco Aguilar MD;  Location: [facility];  Service:                Orthopedics;  Laterality: Left;  gastric balloon      Comment:  placement and removal via endoscopy   TONSILLECTOMY    Family history includes Cancer in her maternal aunt; Diabetes in her maternal grandfather and maternal grandmother; Hyperlipidemia in her mother; Hypertension in an other family member; Stroke in her maternal grandmother.    Current Outpatient Medications on File Prior to Visit:  blood sugar diagnostic Strp, 1 strip by Misc.(Non-Drug; Combo Route) route 2 (two) times daily. Test strips covered by insurance, Disp: 100 strip, Rfl: 1  blood-glucose meter kit, Blood glucose Meter kit covered by insurance. Use as instructed, Disp: 1 each, Rfl: 0  flash glucose scanning reader (FREESTYLE VASILE 14 DAY READER) Misc, 1 each by Misc.(Non-Drug; Combo Route) route once daily., Disp: 1 each,  "Rfl: 0  flash glucose sensor (FREESTYLE VASILE 14 DAY SENSOR) Kit, 1 each by Misc.(Non-Drug; Combo Route) route every 14 (fourteen) days., Disp: 2 kit, Rfl: 11  ibuprofen (ADVIL,MOTRIN) 600 MG tablet, Take 600 mg by mouth every 6 (six) hours as needed., Disp: , Rfl:   insulin aspart U-100 (NOVOLOG FLEXPEN U-100 INSULIN) 100 unit/mL (3 mL) InPn pen, TITRATE UP TO 26 UNITS UNDER THE SKIN 10-15 MIN BEFORE MEALS 3 TIMES A DAY AS DIRECTED, Disp: 75 mL, Rfl: 1  insulin detemir U-100 (LEVEMIR FLEXTOUCH U-100 INSULN) 100 unit/mL (3 mL) InPn pen, INJECT 28 UNITS INTO THE SKIN 2 (TWO) TIMES DAILY., Disp: 60 mL, Rfl: 2  lancets 33 gauge Misc, 1 lancet by Misc.(Non-Drug; Combo Route) route 2 (two) times daily. Covered by insurance, Disp: 100 each, Rfl: 1  losartan (COZAAR) 50 MG tablet, Take 1 tablet (50 mg total) by mouth once daily., Disp: 90 tablet, Rfl: 3  pen needle, diabetic (BD ULTRA-FINE MINI PEN NEEDLE) 31 gauge x 3/16" Ndle, Use five times daily with insulin injections., Disp: 150 each, Rfl: 11  pioglitazone (ACTOS) 15 MG tablet, Take 1 tablet (15 mg total) by mouth once daily., Disp: 90 tablet, Rfl: 1  simvastatin (ZOCOR) 40 MG tablet, Take 1 tablet (40 mg total) by mouth every evening., Disp: 90 tablet, Rfl: 1  traMADoL (ULTRAM) 50 mg tablet, Take 50 mg by mouth every 6 (six) hours as needed., Disp: , Rfl:   tranexamic acid (LYSTEDA ORAL), Lysteda Take No date recorded No form recorded No frequency recorded No route recorded No set duration recorded No set duration amount recorded active No dosage strength recorded No dosage strength units of measure recorded, Disp: , Rfl:   semaglutide (OZEMPIC) 0.25 mg or 0.5 mg(2 mg/1.5 mL) pen injector, INJECT 0.5 MG INTO THE SKIN EVERY 7 DAYS., Disp: 1 pen, Rfl: 2      Allergies:  Review of patient's allergies indicates:   -- Clindamycin -- Hives, Itching and Rash        Review of Systems   Constitutional:  Negative for fever.   HENT:  Negative for sore throat.    Respiratory:  " Negative for cough and shortness of breath.    Cardiovascular:  Negative for chest pain and leg swelling.   Gastrointestinal:  Negative for abdominal pain and diarrhea.   Genitourinary:  Negative for dysuria.   Integumentary:  Negative for rash.   Neurological:  Negative for dizziness.       Objective:      Physical Exam  Vitals reviewed.   Constitutional:       General: She is not in acute distress.     Appearance: She is well-developed. She is not ill-appearing.   Eyes:      General: No scleral icterus.  Cardiovascular:      Rate and Rhythm: Normal rate and regular rhythm.      Heart sounds: Normal heart sounds.   Pulmonary:      Effort: Pulmonary effort is normal. No respiratory distress.      Breath sounds: Normal breath sounds.   Abdominal:      General: Bowel sounds are normal.      Palpations: Abdomen is soft.   Lymphadenopathy:      Cervical: No cervical adenopathy.   Skin:     General: Skin is warm and dry.   Neurological:      Mental Status: She is alert and oriented to person, place, and time.   Psychiatric:         Behavior: Behavior normal.       Assessment:       Problem List Items Addressed This Visit    None  Visit Diagnoses       Preop examination    -  Primary    Uncontrolled type 2 diabetes mellitus with hyperglycemia, with long-term current use of insulin        Relevant Medications    semaglutide (OZEMPIC) 0.25 mg or 0.5 mg(2 mg/1.5 mL) pen injector    Hypertension goal BP (blood pressure) < 130/80        Morbid obesity with BMI of 40.0-44.9, adult        Immunization due        Relevant Orders    Influenza - Quadrivalent (PF) (Completed)              Plan:       Maikol was seen today for pre-op exam.    Diagnoses and all orders for this visit:    Preop examination  -     low to average risk patient    Uncontrolled type 2 diabetes mellitus with hyperglycemia, with long-term current use of insulin  -     refill semaglutide (OZEMPIC) 0.25 mg or 0.5 mg(2 mg/1.5 mL) pen injector; Inject 0.5 mg  into the skin once a week.  -     continue insulin and  pioglitazone   -     Lifestyle modifications discussed    Hypertension goal BP (blood pressure) < 130/80  -     counseled regarding medication compliance  -     Lifestyle modifications discussed    Morbid obesity  -     Lifestyle modifications discussed    Immunization due  -     Influenza - Quadrivalent (PF)    Okay to proceed with surgery.     Paperwork to be faxed to surgeon's office.     F/U in 4 weeks for HTN.

## 2023-01-24 ENCOUNTER — PATIENT OUTREACH (OUTPATIENT)
Dept: ADMINISTRATIVE | Facility: HOSPITAL | Age: 43
End: 2023-01-24
Payer: COMMERCIAL

## 2023-02-15 ENCOUNTER — OFFICE VISIT (OUTPATIENT)
Dept: INTERNAL MEDICINE | Facility: CLINIC | Age: 43
End: 2023-02-15
Payer: COMMERCIAL

## 2023-02-15 VITALS
BODY MASS INDEX: 44.51 KG/M2 | HEIGHT: 62 IN | SYSTOLIC BLOOD PRESSURE: 124 MMHG | DIASTOLIC BLOOD PRESSURE: 92 MMHG | OXYGEN SATURATION: 96 % | HEART RATE: 98 BPM | WEIGHT: 241.88 LBS | RESPIRATION RATE: 20 BRPM | TEMPERATURE: 98 F

## 2023-02-15 DIAGNOSIS — E11.65 UNCONTROLLED TYPE 2 DIABETES MELLITUS WITH HYPERGLYCEMIA, WITH LONG-TERM CURRENT USE OF INSULIN: ICD-10-CM

## 2023-02-15 DIAGNOSIS — I10 HYPERTENSION GOAL BP (BLOOD PRESSURE) < 130/80: Primary | ICD-10-CM

## 2023-02-15 DIAGNOSIS — Z79.4 UNCONTROLLED TYPE 2 DIABETES MELLITUS WITH HYPERGLYCEMIA, WITH LONG-TERM CURRENT USE OF INSULIN: ICD-10-CM

## 2023-02-15 PROCEDURE — 1160F RVW MEDS BY RX/DR IN RCRD: CPT | Mod: CPTII,S$GLB,, | Performed by: INTERNAL MEDICINE

## 2023-02-15 PROCEDURE — 3080F DIAST BP >= 90 MM HG: CPT | Mod: CPTII,S$GLB,, | Performed by: INTERNAL MEDICINE

## 2023-02-15 PROCEDURE — 99999 PR PBB SHADOW E&M-EST. PATIENT-LVL V: ICD-10-PCS | Mod: PBBFAC,,, | Performed by: INTERNAL MEDICINE

## 2023-02-15 PROCEDURE — 99213 PR OFFICE/OUTPT VISIT, EST, LEVL III, 20-29 MIN: ICD-10-PCS | Mod: S$GLB,,, | Performed by: INTERNAL MEDICINE

## 2023-02-15 PROCEDURE — 99213 OFFICE O/P EST LOW 20 MIN: CPT | Mod: S$GLB,,, | Performed by: INTERNAL MEDICINE

## 2023-02-15 PROCEDURE — 1159F MED LIST DOCD IN RCRD: CPT | Mod: CPTII,S$GLB,, | Performed by: INTERNAL MEDICINE

## 2023-02-15 PROCEDURE — 3080F PR MOST RECENT DIASTOLIC BLOOD PRESSURE >= 90 MM HG: ICD-10-PCS | Mod: CPTII,S$GLB,, | Performed by: INTERNAL MEDICINE

## 2023-02-15 PROCEDURE — 3074F PR MOST RECENT SYSTOLIC BLOOD PRESSURE < 130 MM HG: ICD-10-PCS | Mod: CPTII,S$GLB,, | Performed by: INTERNAL MEDICINE

## 2023-02-15 PROCEDURE — 3074F SYST BP LT 130 MM HG: CPT | Mod: CPTII,S$GLB,, | Performed by: INTERNAL MEDICINE

## 2023-02-15 PROCEDURE — 1160F PR REVIEW ALL MEDS BY PRESCRIBER/CLIN PHARMACIST DOCUMENTED: ICD-10-PCS | Mod: CPTII,S$GLB,, | Performed by: INTERNAL MEDICINE

## 2023-02-15 PROCEDURE — 3008F BODY MASS INDEX DOCD: CPT | Mod: CPTII,S$GLB,, | Performed by: INTERNAL MEDICINE

## 2023-02-15 PROCEDURE — 99999 PR PBB SHADOW E&M-EST. PATIENT-LVL V: CPT | Mod: PBBFAC,,, | Performed by: INTERNAL MEDICINE

## 2023-02-15 PROCEDURE — 1159F PR MEDICATION LIST DOCUMENTED IN MEDICAL RECORD: ICD-10-PCS | Mod: CPTII,S$GLB,, | Performed by: INTERNAL MEDICINE

## 2023-02-15 PROCEDURE — 3008F PR BODY MASS INDEX (BMI) DOCUMENTED: ICD-10-PCS | Mod: CPTII,S$GLB,, | Performed by: INTERNAL MEDICINE

## 2023-02-15 NOTE — PROGRESS NOTES
Maikol Pacheco  42 y.o. Black or  female  [id number]    Chief Complaint:  Chief Complaint   Patient presents with    Hypertension       History of Present Illness:  HTN--improved but diastolic remains elevated. She did not take her b/p medication today.   She is interested in the Digital Hypertension and Diabetes programs.     Laboratory:  Lab Results   Component Value Date    WBC 8.64 01/22/2021    HGB 11.7 (L) 01/22/2021    HCT 39.0 01/22/2021     (H) 01/22/2021    CHOL 164 12/08/2022    TRIG 103 12/08/2022    HDL 36 12/08/2022    LDLDIRECT 106.16 12/08/2022    ALT 12 04/20/2022    AST 12 04/20/2022     (L) 04/20/2022    K 4.1 04/20/2022    CL 99 04/20/2022    CREATININE 0.7 04/20/2022    BUN 11 04/20/2022    CO2 23 04/20/2022    TSH 0.842 04/16/2021    HGBA1C 11.9 (H) 12/08/2022       History:  Past Medical History:   Diagnosis Date    Allergy     Anemia     Anxiety     Depression     Diabetes mellitus type II 2008    Gastroparesis     Hyperlipidemia     Hypertension     Morbid obesity     Neuromuscular disorder     Osteoarthritis of spine with radiculopathy, lumbar region 06/01/2021       Medications:  Current Outpatient Medications on File Prior to Visit   Medication Sig Dispense Refill    blood sugar diagnostic Strp 1 strip by Misc.(Non-Drug; Combo Route) route 2 (two) times daily. Test strips covered by insurance 100 strip 1    blood-glucose meter kit Blood glucose Meter kit covered by insurance. Use as instructed 1 each 0    flash glucose scanning reader (FREESTYLE VASILE 14 DAY READER) Misc 1 each by Misc.(Non-Drug; Combo Route) route once daily. 1 each 0    flash glucose sensor (FREESTYLE VASILE 14 DAY SENSOR) Kit 1 each by Misc.(Non-Drug; Combo Route) route every 14 (fourteen) days. 2 kit 11    ibuprofen (ADVIL,MOTRIN) 600 MG tablet Take 600 mg by mouth every 6 (six) hours as needed.      insulin aspart U-100 (NOVOLOG FLEXPEN U-100 INSULIN) 100 unit/mL (3 mL) InPn pen  "TITRATE UP TO 26 UNITS UNDER THE SKIN 10-15 MIN BEFORE MEALS 3 TIMES A DAY AS DIRECTED 75 mL 1    insulin detemir U-100 (LEVEMIR FLEXTOUCH U-100 INSULN) 100 unit/mL (3 mL) InPn pen INJECT 28 UNITS INTO THE SKIN 2 (TWO) TIMES DAILY. 60 mL 2    lancets 33 gauge Misc 1 lancet by Misc.(Non-Drug; Combo Route) route 2 (two) times daily. Covered by insurance 100 each 1    losartan (COZAAR) 50 MG tablet Take 1 tablet (50 mg total) by mouth once daily. 90 tablet 3    pen needle, diabetic (BD ULTRA-FINE MINI PEN NEEDLE) 31 gauge x 3/16" Ndle Use five times daily with insulin injections. 150 each 11    pioglitazone (ACTOS) 15 MG tablet Take 1 tablet (15 mg total) by mouth once daily. 90 tablet 1    semaglutide (OZEMPIC) 0.25 mg or 0.5 mg(2 mg/1.5 mL) pen injector Inject 0.5 mg into the skin once a week. 1 pen 2    simvastatin (ZOCOR) 40 MG tablet Take 1 tablet (40 mg total) by mouth every evening. 90 tablet 1    traMADoL (ULTRAM) 50 mg tablet Take 50 mg by mouth every 6 (six) hours as needed.      tranexamic acid (LYSTEDA ORAL) Lysteda Take No date recorded No form recorded No frequency recorded No route recorded No set duration recorded No set duration amount recorded active No dosage strength recorded No dosage strength units of measure recorded         Allergies:  Review of patient's allergies indicates:   Allergen Reactions    Clindamycin Hives, Itching and Rash     Other reaction(s): Not Indicated       Review of Systems   Respiratory:  Negative for shortness of breath.    Cardiovascular:  Negative for chest pain.   Neurological:  Negative for dizziness and headaches.     Exam:  Vitals:    02/15/23 1035   BP: (!) 124/92   Pulse: 98   Resp: 20   Temp: 97.5 °F (36.4 °C)     Weight: 109.7 kg (241 lb 13.5 oz)   Body mass index is 44.23 kg/m².      Physical Exam  Vitals reviewed.   Constitutional:       General: She is not in acute distress.     Appearance: She is well-developed. She is obese. She is not ill-appearing. "   Cardiovascular:      Rate and Rhythm: Normal rate.   Pulmonary:      Effort: Pulmonary effort is normal. No respiratory distress.   Neurological:      Mental Status: She is alert and oriented to person, place, and time.   Psychiatric:         Behavior: Behavior normal.         Thought Content: Thought content normal.         Judgment: Judgment normal.       Assessment:  The primary encounter diagnosis was Hypertension goal BP (blood pressure) < 130/80. A diagnosis of Uncontrolled type 2 diabetes mellitus with hyperglycemia, with long-term current use of insulin was also pertinent to this visit.    Plan:  Hypertension goal BP (blood pressure) < 130/80  -     Hypertension Digital Medicine (HDMP) Enrollment Order  -     Hypertension Digital Medicine (HDMP): Assign Onboarding Questionnaires    Uncontrolled type 2 diabetes mellitus with hyperglycemia, with long-term current use of insulin  -     Diabetes Digital Medicine (DDMP) Enrollment Order  -     Diabetes Digital Medicine (DDMP): Assign Onboarding Questionnaires    Follow up in about 3 months (around 5/15/2023).

## 2023-03-16 ENCOUNTER — HOSPITAL ENCOUNTER (OUTPATIENT)
Dept: RADIOLOGY | Facility: HOSPITAL | Age: 43
Discharge: HOME | End: 2023-03-16
Attending: OBSTETRICS & GYNECOLOGY
Payer: COMMERCIAL

## 2023-03-16 DIAGNOSIS — D25.9 LEIOMYOMA OF UTERUS, UNSPECIFIED: ICD-10-CM

## 2023-03-23 ENCOUNTER — HOSPITAL ENCOUNTER (OUTPATIENT)
Dept: RADIOLOGY | Facility: HOSPITAL | Age: 43
Discharge: HOME | End: 2023-03-23
Attending: OBSTETRICS & GYNECOLOGY
Payer: COMMERCIAL

## 2023-03-23 PROCEDURE — 76830 TRANSVAGINAL US NON-OB: CPT | Mod: 26,,, | Performed by: RADIOLOGY

## 2023-03-23 PROCEDURE — 76856 US PELVIS COMP WITH TRANSVAG NON-OB (XPD): ICD-10-PCS | Mod: 26,,, | Performed by: RADIOLOGY

## 2023-03-23 PROCEDURE — 76856 US EXAM PELVIC COMPLETE: CPT | Mod: TC

## 2023-03-23 PROCEDURE — 76830 US PELVIS COMP WITH TRANSVAG NON-OB (XPD): ICD-10-PCS | Mod: 26,,, | Performed by: RADIOLOGY

## 2023-03-23 PROCEDURE — 76856 US EXAM PELVIC COMPLETE: CPT | Mod: 26,,, | Performed by: RADIOLOGY

## 2023-05-01 ENCOUNTER — PATIENT OUTREACH (OUTPATIENT)
Dept: ADMINISTRATIVE | Facility: HOSPITAL | Age: 43
End: 2023-05-01
Payer: COMMERCIAL

## 2023-05-05 ENCOUNTER — PATIENT OUTREACH (OUTPATIENT)
Dept: ADMINISTRATIVE | Facility: HOSPITAL | Age: 43
End: 2023-05-05
Payer: COMMERCIAL

## 2023-05-05 NOTE — PROGRESS NOTES
Working Diabetes Lab Report.    Pt has lab appt scheduled, 5/08/23.  All needed labs scheduled.

## 2023-05-11 ENCOUNTER — LAB VISIT (OUTPATIENT)
Dept: LAB | Facility: HOSPITAL | Age: 43
End: 2023-05-11
Attending: INTERNAL MEDICINE
Payer: COMMERCIAL

## 2023-05-11 ENCOUNTER — TELEPHONE (OUTPATIENT)
Dept: DIABETES | Facility: CLINIC | Age: 43
End: 2023-05-11

## 2023-05-11 ENCOUNTER — OFFICE VISIT (OUTPATIENT)
Dept: DIABETES | Facility: CLINIC | Age: 43
End: 2023-05-11
Payer: COMMERCIAL

## 2023-05-11 DIAGNOSIS — I10 HYPERTENSION GOAL BP (BLOOD PRESSURE) < 130/80: ICD-10-CM

## 2023-05-11 DIAGNOSIS — E78.5 HYPERLIPIDEMIA ASSOCIATED WITH TYPE 2 DIABETES MELLITUS: Chronic | ICD-10-CM

## 2023-05-11 DIAGNOSIS — E11.65 UNCONTROLLED TYPE 2 DIABETES MELLITUS WITH HYPERGLYCEMIA, WITH LONG-TERM CURRENT USE OF INSULIN: Primary | ICD-10-CM

## 2023-05-11 DIAGNOSIS — Z79.4 UNCONTROLLED TYPE 2 DIABETES MELLITUS WITH HYPERGLYCEMIA, WITH LONG-TERM CURRENT USE OF INSULIN: Primary | ICD-10-CM

## 2023-05-11 DIAGNOSIS — Z12.31 SCREENING MAMMOGRAM FOR BREAST CANCER: ICD-10-CM

## 2023-05-11 DIAGNOSIS — E78.5 HYPERLIPIDEMIA LDL GOAL <70: ICD-10-CM

## 2023-05-11 DIAGNOSIS — Z79.4 TYPE 2 DIABETES MELLITUS WITHOUT COMPLICATION, WITH LONG-TERM CURRENT USE OF INSULIN: ICD-10-CM

## 2023-05-11 DIAGNOSIS — E11.9 TYPE 2 DIABETES MELLITUS WITHOUT COMPLICATION, WITH LONG-TERM CURRENT USE OF INSULIN: ICD-10-CM

## 2023-05-11 DIAGNOSIS — E11.69 HYPERLIPIDEMIA ASSOCIATED WITH TYPE 2 DIABETES MELLITUS: Chronic | ICD-10-CM

## 2023-05-11 DIAGNOSIS — E11.9 CONTROLLED TYPE 2 DIABETES MELLITUS WITHOUT COMPLICATION, WITHOUT LONG-TERM CURRENT USE OF INSULIN: ICD-10-CM

## 2023-05-11 DIAGNOSIS — E66.01 MORBID OBESITY WITH BMI OF 45.0-49.9, ADULT: ICD-10-CM

## 2023-05-11 LAB
ALBUMIN SERPL BCP-MCNC: 3 G/DL (ref 3.5–5.2)
ALBUMIN/CREAT UR: 49.2 UG/MG (ref 0–30)
ALP SERPL-CCNC: 89 U/L (ref 55–135)
ALT SERPL W/O P-5'-P-CCNC: 10 U/L (ref 10–44)
ANION GAP SERPL CALC-SCNC: 11 MMOL/L (ref 8–16)
AST SERPL-CCNC: 12 U/L (ref 10–40)
BILIRUB SERPL-MCNC: 0.2 MG/DL (ref 0.1–1)
BUN SERPL-MCNC: 11 MG/DL (ref 6–20)
CALCIUM SERPL-MCNC: 9.4 MG/DL (ref 8.7–10.5)
CHLORIDE SERPL-SCNC: 101 MMOL/L (ref 95–110)
CHOLEST SERPL-MCNC: 179 MG/DL (ref 120–199)
CHOLEST/HDLC SERPL: 4.2 {RATIO} (ref 2–5)
CO2 SERPL-SCNC: 22 MMOL/L (ref 23–29)
CREAT SERPL-MCNC: 0.8 MG/DL (ref 0.5–1.4)
CREAT UR-MCNC: 122 MG/DL (ref 15–325)
EST. GFR  (NO RACE VARIABLE): >60 ML/MIN/1.73 M^2
ESTIMATED AVG GLUCOSE: 263 MG/DL (ref 68–131)
GLUCOSE SERPL-MCNC: 352 MG/DL (ref 70–110)
HBA1C MFR BLD: 10.8 % (ref 4–5.6)
HDLC SERPL-MCNC: 43 MG/DL (ref 40–75)
HDLC SERPL: 24 % (ref 20–50)
LDLC SERPL CALC-MCNC: 117 MG/DL (ref 63–159)
MICROALBUMIN UR DL<=1MG/L-MCNC: 60 UG/ML
NONHDLC SERPL-MCNC: 136 MG/DL
POTASSIUM SERPL-SCNC: 4.1 MMOL/L (ref 3.5–5.1)
PROT SERPL-MCNC: 7.5 G/DL (ref 6–8.4)
SODIUM SERPL-SCNC: 134 MMOL/L (ref 136–145)
TRIGL SERPL-MCNC: 95 MG/DL (ref 30–150)

## 2023-05-11 PROCEDURE — 83036 HEMOGLOBIN GLYCOSYLATED A1C: CPT | Performed by: INTERNAL MEDICINE

## 2023-05-11 PROCEDURE — 80053 COMPREHEN METABOLIC PANEL: CPT | Performed by: INTERNAL MEDICINE

## 2023-05-11 PROCEDURE — 82570 ASSAY OF URINE CREATININE: CPT | Performed by: INTERNAL MEDICINE

## 2023-05-11 PROCEDURE — 36415 COLL VENOUS BLD VENIPUNCTURE: CPT | Mod: PO | Performed by: INTERNAL MEDICINE

## 2023-05-11 PROCEDURE — 80061 LIPID PANEL: CPT | Performed by: INTERNAL MEDICINE

## 2023-05-11 PROCEDURE — 99214 OFFICE O/P EST MOD 30 MIN: CPT | Mod: 95,,, | Performed by: PHYSICIAN ASSISTANT

## 2023-05-11 PROCEDURE — 99214 PR OFFICE/OUTPT VISIT, EST, LEVL IV, 30-39 MIN: ICD-10-PCS | Mod: 95,,, | Performed by: PHYSICIAN ASSISTANT

## 2023-05-11 RX ORDER — SEMAGLUTIDE 2.68 MG/ML
2 INJECTION, SOLUTION SUBCUTANEOUS
Qty: 3 ML | Refills: 11 | Status: SHIPPED | OUTPATIENT
Start: 2023-05-11 | End: 2024-03-21

## 2023-05-11 NOTE — PATIENT INSTRUCTIONS
CURRENT DM MEDICATIONS:    Levemir 20 units twice a day  Novolog 28 units with meals TID   Ozempic 2 mg weekly  Actos 15 mg

## 2023-05-11 NOTE — Clinical Note
"Dexcom G7 to DMS  Audio 6/20 at 3p "Pre Liquid diet audio / poss DEX" Audio 7/7 at 2p "Post Bar Surg / poss DEX" Audio 6/1 "3 wk fu / poss DEX - ok to use urgent""

## 2023-05-11 NOTE — PROGRESS NOTES
PCP: Zari Lim DO    Subjective:     Chief Complaint: Diabetes - Established Patient    TELEMEDICINE VISIT:     The patient location is: Home  The chief complaint leading to consultation is: Diabetes Follow up  Visit type: Virtual visit with synchronous audio and video  Total time spent with patient: 20  Each patient to whom he or she provides medical services by telemedicine is:  (1) informed of the relationship between the physician and patient and the respective role of any other health care provider with respect to management of the patient; and (2) notified that he or she may decline to receive medical services by telemedicine and may withdraw from such care at any time.    Notes: N/A      HISTORY OF PRESENT ILLNESS: 42 y.o.   female presenting for diabetes management visit.   The patient's last visit with me was on 5/14/2020.  Patient has had Type II diabetes since 2010.  Pertinent to decision making is the following comorbidities: HTN, HLD and Obesity by BMI  Patient has the following Diabetes complications: with diabetic polyneuropathy  She  has attended diabetes education in the past.     Patient's most recent A1c of 11.9% was completed 5 months ago.   Patient states since Her last A1c Her blood glucose levels have been high  throughout the day .   Patient monitors blood glucose 4 times per day with meter : Fasting, Before Lunch, Before Dinner, and Before Bed.   Patient blood glucose monitoring device will not be uploaded into [employer] today.   Per patient recall, fasting blood sugars ranging high 200s - low 300s.   Patient endorses the following diabetes related symptoms:  None .   Patient is due today for the following diabetes-related health maintenance standards: Foot Exam , Eye Exam, COVID-19 Vaccine , and Mammogram . Patient denies urgent issues with feet. Scheduled for eye exam in June. Labs pulled today.   She denies recent hospital admissions or emergency room visits.    She denies having hypoglycemia.   Patient's concerns today include glycemic control. Of note, patient scheduled for gastric sleeve on July 6, 2024 - liquid diet to begin 6/22. Reporting BP high even with losartan compliance.   Patient medication regimen is as below.     CURRENT DM MEDICATIONS:    Levemir 24 units BID  Novolog 28 units with meals TID   Ozempic 1 mg weekly  Actos 15 mg     Patient has failed the following Diabetes medications:   Metformin   Trulicity       Labs Reviewed.       Lab Results   Component Value Date    CPEPTIDE 2.2 12/29/2016     No results found for: GLUTAMICACID       //   , There is no height or weight on file to calculate BMI.  Her blood sugar in clinic today is:    Lab Results   Component Value Date    POCGLU 77 10/14/2019       Review of Systems   Constitutional:  Negative for activity change, appetite change, chills and fever.   HENT:  Negative for dental problem, mouth sores, nosebleeds, sore throat and trouble swallowing.    Eyes:  Negative for pain and discharge.   Respiratory:  Negative for shortness of breath, wheezing and stridor.    Cardiovascular:  Negative for chest pain, palpitations and leg swelling.   Gastrointestinal:  Negative for abdominal pain, diarrhea, nausea and vomiting.   Endocrine: Negative for polydipsia, polyphagia and polyuria.   Genitourinary:  Negative for dysuria, frequency and urgency.   Musculoskeletal:  Negative for joint swelling and myalgias.   Skin:  Negative for rash and wound.   Neurological:  Negative for dizziness, syncope, weakness and headaches.   Psychiatric/Behavioral:  Negative for behavioral problems and dysphoric mood.        Diabetes Management Status  Statin: Taking  ACE/ARB: Taking    Screening or Prevention Patient's value Goal Complete/Controlled?   HgA1C Testing and Control   Lab Results   Component Value Date    HGBA1C 11.9 (H) 12/08/2022      Annually/Less than 8% No   Lipid profile : 12/08/2022 Annually Yes   LDL control Lab  Results   Component Value Date    LDLCALC 121.4 2022    Annually/Less than 100 mg/dl  No   Nephropathy screening Lab Results   Component Value Date    MICALBCREAT 16.5 2022     Lab Results   Component Value Date    PROTEINUA Negative 2021    Annually Yes   Blood pressure BP Readings from Last 1 Encounters:   02/15/23 (!) 124/92    Less than 140/90 Yes   Dilated retinal exam : 10/16/2020 Annually Yes    Foot exam   : 2022 Annually Yes     Social History     Socioeconomic History    Marital status: Single    Number of children: 0   Occupational History     Employer: [employer]   Tobacco Use    Smoking status: Former     Years: 20.00     Types: Cigarettes     Quit date:      Years since quittin.3    Smokeless tobacco: Never   Substance and Sexual Activity    Alcohol use: Not Currently     Comment: socially    Drug use: No    Sexual activity: Yes     Partners: Male     Birth control/protection: Condom   Social History Narrative    Single. Lives with mom. Has no children. 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      Past Medical History:   Diagnosis Date    Allergy     Anemia     Anxiety     Depression     Diabetes mellitus type II     Gastroparesis     Hyperlipidemia     Hypertension     Morbid obesity     Neuromuscular disorder     Osteoarthritis of spine with radiculopathy, lumbar region 2021       Objective:        Physical Exam  Constitutional:       General: She is not in acute distress.     Appearance: She is well-developed. She is not diaphoretic.   HENT:      Head: Normocephalic and atraumatic.      Right Ear: External ear normal.      Left Ear: External ear normal.      Nose: Nose normal.   Eyes:      General:         Right eye: No discharge.         Left eye: No discharge.   Pulmonary:      Effort: Pulmonary effort is normal. No respiratory distress.      Breath sounds: No stridor.   Musculoskeletal:         General: Normal range of motion.      Cervical back:  Normal range of motion.   Skin:     Coloration: Skin is not pale.   Neurological:      Mental Status: She is alert and oriented to person, place, and time.      Motor: No abnormal muscle tone.      Coordination: Coordination normal.   Psychiatric:         Behavior: Behavior normal.         Thought Content: Thought content normal.         Judgment: Judgment normal.       Physical Exam limited secondary to Telemedicine visit    Assessment / Plan:     Uncontrolled type 2 diabetes mellitus with hyperglycemia, with long-term current use of insulin  -     semaglutide (OZEMPIC) 2 mg/dose (8 mg/3 mL) PnIj; Inject 2 mg into the skin every 7 days.  Dispense: 3 mL; Refill: 11    Hypertension goal BP (blood pressure) < 130/80    Hyperlipidemia associated with type 2 diabetes mellitus    Morbid obesity with BMI of 45.0-49.9, adult    Screening mammogram for breast cancer  -     Mammo Digital Screening Bilat w/ [facility]; Future; Expected date: 05/11/2023        Additional Plan Details:    - POCT Glucose  - Encouraged continuation of lifestyle changes including regular exercise and limiting carbohydrates to 30-45 grams per meal threes times daily and 15 grams per snack with a limit of two daily.   - Encouraged continued monitoring of blood glucose with maintenance of 4 times daily at Fasting, Before Lunch, Before Dinner, and Before Bed. Dexcom G7 to DMS.   - Current DM Medication Regimen: Change Ozempic 2 mg weekly. Change Levemir 20 units BID. Continue Novolog 28 units TID before meals - dose 15 mins before meals. Continue Actos 15 mg daily.   - Health Maintenance standards addressed today: Foot Exam - deferred by patient today because Telemedicine or Telephone visit, Eye Exam - will be completed within [facility] system and scheduled today, COVID - 19 Vaccine - patient will schedule [facility] , and Mammogram scheduled   - Nursing Visit: Patient is age 79 or younger with an A1c of 7.5 or greater and  will defer NV until after  A1c .   - Follow up in 3 weeks with A1c prior.      CURRENT DM MEDICATIONS:    Levemir 20 units twice a day  Novolog 28 units with meals TID   Ozempic 2 mg weekly  Actos 15 mg     Blakeney McKnight, PA-C [facility]

## 2023-05-30 ENCOUNTER — PATIENT MESSAGE (OUTPATIENT)
Dept: DIABETES | Facility: CLINIC | Age: 43
End: 2023-05-30
Payer: COMMERCIAL

## 2023-06-01 ENCOUNTER — OFFICE VISIT (OUTPATIENT)
Dept: DIABETES | Facility: CLINIC | Age: 43
End: 2023-06-01
Payer: COMMERCIAL

## 2023-06-01 DIAGNOSIS — E78.5 HYPERLIPIDEMIA ASSOCIATED WITH TYPE 2 DIABETES MELLITUS: ICD-10-CM

## 2023-06-01 DIAGNOSIS — I10 HYPERTENSION GOAL BP (BLOOD PRESSURE) < 130/80: ICD-10-CM

## 2023-06-01 DIAGNOSIS — E11.69 HYPERLIPIDEMIA ASSOCIATED WITH TYPE 2 DIABETES MELLITUS: ICD-10-CM

## 2023-06-01 DIAGNOSIS — E11.65 UNCONTROLLED TYPE 2 DIABETES MELLITUS WITH HYPERGLYCEMIA, WITH LONG-TERM CURRENT USE OF INSULIN: Primary | ICD-10-CM

## 2023-06-01 DIAGNOSIS — Z79.4 UNCONTROLLED TYPE 2 DIABETES MELLITUS WITH HYPERGLYCEMIA, WITH LONG-TERM CURRENT USE OF INSULIN: Primary | ICD-10-CM

## 2023-06-01 DIAGNOSIS — E66.01 MORBID OBESITY WITH BMI OF 45.0-49.9, ADULT: ICD-10-CM

## 2023-06-01 DIAGNOSIS — E11.9 CONTROLLED TYPE 2 DIABETES MELLITUS WITHOUT COMPLICATION, WITHOUT LONG-TERM CURRENT USE OF INSULIN: ICD-10-CM

## 2023-06-01 PROCEDURE — 99212 PR OFFICE/OUTPT VISIT, EST, LEVL II, 10-19 MIN: ICD-10-PCS | Mod: 95,,, | Performed by: PHYSICIAN ASSISTANT

## 2023-06-01 PROCEDURE — 99212 OFFICE O/P EST SF 10 MIN: CPT | Mod: 95,,, | Performed by: PHYSICIAN ASSISTANT

## 2023-06-01 PROCEDURE — 3046F HEMOGLOBIN A1C LEVEL >9.0%: CPT | Mod: CPTII,95,, | Performed by: PHYSICIAN ASSISTANT

## 2023-06-01 PROCEDURE — 3060F POS MICROALBUMINURIA REV: CPT | Mod: CPTII,95,, | Performed by: PHYSICIAN ASSISTANT

## 2023-06-01 PROCEDURE — 3046F PR MOST RECENT HEMOGLOBIN A1C LEVEL > 9.0%: ICD-10-PCS | Mod: CPTII,95,, | Performed by: PHYSICIAN ASSISTANT

## 2023-06-01 PROCEDURE — 3066F NEPHROPATHY DOC TX: CPT | Mod: CPTII,95,, | Performed by: PHYSICIAN ASSISTANT

## 2023-06-01 PROCEDURE — 3060F PR POS MICROALBUMINURIA RESULT DOCUMENTED/REVIEW: ICD-10-PCS | Mod: CPTII,95,, | Performed by: PHYSICIAN ASSISTANT

## 2023-06-01 PROCEDURE — 3066F PR DOCUMENTATION OF TREATMENT FOR NEPHROPATHY: ICD-10-PCS | Mod: CPTII,95,, | Performed by: PHYSICIAN ASSISTANT

## 2023-06-01 RX ORDER — PIOGLITAZONEHYDROCHLORIDE 15 MG/1
15 TABLET ORAL DAILY
Qty: 90 TABLET | Refills: 1 | Status: SHIPPED | OUTPATIENT
Start: 2023-06-01 | End: 2023-08-21

## 2023-06-01 RX ORDER — INSULIN DETEMIR 100 [IU]/ML
30 INJECTION, SOLUTION SUBCUTANEOUS 2 TIMES DAILY
Qty: 18 ML | Refills: 11 | Status: SHIPPED | OUTPATIENT
Start: 2023-06-01 | End: 2024-03-21 | Stop reason: SDUPTHER

## 2023-06-01 RX ORDER — INSULIN ASPART 100 [IU]/ML
32 INJECTION, SOLUTION INTRAVENOUS; SUBCUTANEOUS
Qty: 27 ML | Refills: 11 | Status: SHIPPED | OUTPATIENT
Start: 2023-06-01 | End: 2024-03-21 | Stop reason: SDUPTHER

## 2023-06-01 NOTE — PATIENT INSTRUCTIONS
CURRENT DM MEDICATIONS:    Levemir 30 units twice a day  Novolog 32 units with meals TID   Ozempic 2 mg weekly  Actos 15 mg

## 2023-06-01 NOTE — PROGRESS NOTES
PCP: Zari Lim DO    Subjective:     Chief Complaint: Diabetes - Established Patient    Established Patient - Audio Only Telehealth Visit     The patient location is: Home  The chief complaint leading to consultation is: Diabetes follow up  Visit type: Virtual visit with audio only (telephone)  Total Time Spent with Patient: 9 minutes     The reason for the audio only service rather than synchronous audio and video virtual visit was related to technical difficulties or patient preference/necessity.     Each patient to whom I provide medical services by telemedicine is:  (1) informed of the relationship between the physician and patient and the respective role of any other health care provider with respect to management of the patient; and (2) notified that they may decline to receive medical services by telemedicine and may withdraw from such care at any time. Patient verbally consented to receive this service via voice-only telephone call.    This service was not originating from a related E/M service provided within the previous 7 days nor will  to an E/M service or procedure within the next 24 hours or my soonest available appointment.  Prevailing standard of care was able to be met in this audio-only visit.         HISTORY OF PRESENT ILLNESS: 42 y.o.   female presenting for diabetes management visit.   The patient's last visit with me was on 5/11/2023.  Patient has had Type II diabetes since 2010.  Pertinent to decision making is the following comorbidities: HTN, HLD and Obesity by BMI  Patient has the following Diabetes complications: with diabetic polyneuropathy  She  has attended diabetes education in the past.     Patient's most recent A1c of 10.8% was completed 2 weeks ago.   Patient states since Her last A1c Her blood glucose levels have been high  throughout the day .   Patient monitors blood glucose 4 times per day with meter : Fasting, Before Lunch, Before Dinner, and Before  Bed. Dexcom pending with DMS.   Patient blood glucose monitoring device will not be uploaded into Media Section today.   Per patient recall, all blood sugars ranging 230 - 300.  Patient endorses the following diabetes related symptoms:  None .   Patient is due today for the following diabetes-related health maintenance standards: Foot Exam , Eye Exam, COVID-19 Vaccine , and Mammogram . Patient denies urgent issues with feet. Scheduled for eye exam in June.   She denies recent hospital admissions or emergency room visits.   She denies having hypoglycemia.   Patient's concerns today include glycemic control. Of note, patient scheduled for gastric sleeve on July 6, 2024 - liquid diet to begin 6/22.   Patient medication regimen is as below.     CURRENT DM MEDICATIONS:    Levemir 24 units twice a day  Novolog 28 units with meals TID   Ozempic 2 mg weekly  Actos 15 mg     Patient has failed the following Diabetes medications:   Metformin   Trulicity       Labs Reviewed.       Lab Results   Component Value Date    CPEPTIDE 2.2 12/29/2016     No results found for: GLUTAMICACID       //   , There is no height or weight on file to calculate BMI.  Her blood sugar in clinic today is:    Lab Results   Component Value Date    POCGLU 77 10/14/2019       Review of Systems   Constitutional:  Negative for activity change, appetite change, chills and fever.   HENT:  Negative for dental problem, mouth sores, nosebleeds, sore throat and trouble swallowing.    Eyes:  Negative for pain and discharge.   Respiratory:  Negative for shortness of breath, wheezing and stridor.    Cardiovascular:  Negative for chest pain, palpitations and leg swelling.   Gastrointestinal:  Negative for abdominal pain, diarrhea, nausea and vomiting.   Endocrine: Negative for polydipsia, polyphagia and polyuria.   Genitourinary:  Negative for dysuria, frequency and urgency.   Musculoskeletal:  Negative for joint swelling and myalgias.   Skin:  Negative for rash  and wound.   Neurological:  Negative for dizziness, syncope, weakness and headaches.   Psychiatric/Behavioral:  Negative for behavioral problems and dysphoric mood.        Diabetes Management Status  Statin: Taking  ACE/ARB: Taking    Screening or Prevention Patient's value Goal Complete/Controlled?   HgA1C Testing and Control   Lab Results   Component Value Date    HGBA1C 10.8 (H) 2023      Annually/Less than 8% No   Lipid profile : 2023 Annually Yes   LDL control Lab Results   Component Value Date    LDLCALC 117.0 2023    Annually/Less than 100 mg/dl  No   Nephropathy screening Lab Results   Component Value Date    MICALBCREAT 49.2 (H) 2023     Lab Results   Component Value Date    PROTEINUA Negative 2021    Annually Yes   Blood pressure BP Readings from Last 1 Encounters:   02/15/23 (!) 124/92    Less than 140/90 Yes   Dilated retinal exam : 10/16/2020 Annually Yes    Foot exam   : 2022 Annually Yes     Social History     Socioeconomic History    Marital status: Single    Number of children: 0   Occupational History     Employer: [employer]   Tobacco Use    Smoking status: Former     Years: 20.00     Types: Cigarettes     Quit date:      Years since quittin.4    Smokeless tobacco: Never   Substance and Sexual Activity    Alcohol use: Not Currently     Comment: socially    Drug use: No    Sexual activity: Yes     Partners: Male     Birth control/protection: Condom   Social History Narrative    Single. Lives with mom. Has no children. 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      Past Medical History:   Diagnosis Date    Allergy     Anemia     Anxiety     Depression     Diabetes mellitus type II 2008    Gastroparesis     Hyperlipidemia     Hypertension     Morbid obesity     Neuromuscular disorder     Osteoarthritis of spine with radiculopathy, lumbar region 2021       Objective:        Physical Exam  Neurological:      Mental Status: She is alert and oriented  to person, place, and time. Mental status is at baseline.   Psychiatric:         Mood and Affect: Mood normal.         Behavior: Behavior normal.         Thought Content: Thought content normal.         Judgment: Judgment normal.       Physical Exam limited secondary to Telemedicine visit    Assessment / Plan:     Uncontrolled type 2 diabetes mellitus with hyperglycemia, with long-term current use of insulin  -     pioglitazone (ACTOS) 15 MG tablet; Take 1 tablet (15 mg total) by mouth once daily.  Dispense: 90 tablet; Refill: 1  -     insulin detemir U-100, Levemir, (LEVEMIR FLEXTOUCH U100 INSULIN) 100 unit/mL (3 mL) InPn pen; Inject 30 Units into the skin 2 (two) times daily.  Dispense: 18 mL; Refill: 11  -     insulin aspart U-100 (NOVOLOG FLEXPEN U-100 INSULIN) 100 unit/mL (3 mL) InPn pen; Inject 32 Units into the skin 3 (three) times daily with meals.  Dispense: 27 mL; Refill: 11    Hypertension goal BP (blood pressure) < 130/80    Hyperlipidemia associated with type 2 diabetes mellitus    Morbid obesity with BMI of 45.0-49.9, adult    Controlled type 2 diabetes mellitus without complication, without long-term current use of insulin        Additional Plan Details:    - POCT Glucose  - Encouraged continuation of lifestyle changes including regular exercise and limiting carbohydrates to 30-45 grams per meal threes times daily and 15 grams per snack with a limit of two daily.   - Encouraged continued monitoring of blood glucose with maintenance of 4 times daily at Fasting, Before Lunch, Before Dinner, and Before Bed. Dexcom G7 pending.  - Current DM Medication Regimen: Continue Ozempic 2 mg weekly. Change Levemir 30 units BID. Change Novolog 32 units TID before meals - dose 15 mins before meals. Continue Actos 15 mg daily.   - Health Maintenance standards addressed today: Foot Exam - deferred by patient today because Telemedicine or Telephone visit, Eye Exam - will be completed within [facility] and scheduled  today, COVID - 19 Vaccine - patient will schedule outside of [facility] , and Mammogram scheduled   - Nursing Visit: Patient is age 79 or younger with an A1c of 7.5 or greater and  will defer NV until after A1c .   - Follow up in 3 weeks with A1c prior.     Blakeney McKnight, PA-C [facility]

## 2023-06-02 ENCOUNTER — PATIENT MESSAGE (OUTPATIENT)
Dept: DIABETES | Facility: CLINIC | Age: 43
End: 2023-06-02
Payer: COMMERCIAL

## 2023-06-09 ENCOUNTER — OFFICE VISIT (OUTPATIENT)
Dept: INTERNAL MEDICINE | Facility: CLINIC | Age: 43
End: 2023-06-09
Payer: COMMERCIAL

## 2023-06-09 VITALS
WEIGHT: 238.31 LBS | TEMPERATURE: 97 F | SYSTOLIC BLOOD PRESSURE: 132 MMHG | BODY MASS INDEX: 43.86 KG/M2 | HEART RATE: 100 BPM | RESPIRATION RATE: 20 BRPM | HEIGHT: 62 IN | OXYGEN SATURATION: 98 % | DIASTOLIC BLOOD PRESSURE: 84 MMHG

## 2023-06-09 DIAGNOSIS — E66.01 MORBID OBESITY WITH BMI OF 45.0-49.9, ADULT: ICD-10-CM

## 2023-06-09 DIAGNOSIS — I10 HYPERTENSION GOAL BP (BLOOD PRESSURE) < 130/80: ICD-10-CM

## 2023-06-09 DIAGNOSIS — Z79.4 UNCONTROLLED TYPE 2 DIABETES MELLITUS WITH HYPERGLYCEMIA, WITH LONG-TERM CURRENT USE OF INSULIN: ICD-10-CM

## 2023-06-09 DIAGNOSIS — E11.65 UNCONTROLLED TYPE 2 DIABETES MELLITUS WITH HYPERGLYCEMIA, WITH LONG-TERM CURRENT USE OF INSULIN: ICD-10-CM

## 2023-06-09 DIAGNOSIS — E11.69 HYPERLIPIDEMIA ASSOCIATED WITH TYPE 2 DIABETES MELLITUS: Primary | Chronic | ICD-10-CM

## 2023-06-09 DIAGNOSIS — E78.5 HYPERLIPIDEMIA ASSOCIATED WITH TYPE 2 DIABETES MELLITUS: Primary | Chronic | ICD-10-CM

## 2023-06-09 PROCEDURE — 3046F PR MOST RECENT HEMOGLOBIN A1C LEVEL > 9.0%: ICD-10-PCS | Mod: CPTII,S$GLB,, | Performed by: PHYSICIAN ASSISTANT

## 2023-06-09 PROCEDURE — 99999 PR PBB SHADOW E&M-EST. PATIENT-LVL IV: CPT | Mod: PBBFAC,,, | Performed by: PHYSICIAN ASSISTANT

## 2023-06-09 PROCEDURE — 99214 OFFICE O/P EST MOD 30 MIN: CPT | Mod: S$GLB,,, | Performed by: PHYSICIAN ASSISTANT

## 2023-06-09 PROCEDURE — 3075F SYST BP GE 130 - 139MM HG: CPT | Mod: CPTII,S$GLB,, | Performed by: PHYSICIAN ASSISTANT

## 2023-06-09 PROCEDURE — 3075F PR MOST RECENT SYSTOLIC BLOOD PRESS GE 130-139MM HG: ICD-10-PCS | Mod: CPTII,S$GLB,, | Performed by: PHYSICIAN ASSISTANT

## 2023-06-09 PROCEDURE — 1160F RVW MEDS BY RX/DR IN RCRD: CPT | Mod: CPTII,S$GLB,, | Performed by: PHYSICIAN ASSISTANT

## 2023-06-09 PROCEDURE — 3060F POS MICROALBUMINURIA REV: CPT | Mod: CPTII,S$GLB,, | Performed by: PHYSICIAN ASSISTANT

## 2023-06-09 PROCEDURE — 3079F PR MOST RECENT DIASTOLIC BLOOD PRESSURE 80-89 MM HG: ICD-10-PCS | Mod: CPTII,S$GLB,, | Performed by: PHYSICIAN ASSISTANT

## 2023-06-09 PROCEDURE — 3060F PR POS MICROALBUMINURIA RESULT DOCUMENTED/REVIEW: ICD-10-PCS | Mod: CPTII,S$GLB,, | Performed by: PHYSICIAN ASSISTANT

## 2023-06-09 PROCEDURE — 3008F BODY MASS INDEX DOCD: CPT | Mod: CPTII,S$GLB,, | Performed by: PHYSICIAN ASSISTANT

## 2023-06-09 PROCEDURE — 1159F PR MEDICATION LIST DOCUMENTED IN MEDICAL RECORD: ICD-10-PCS | Mod: CPTII,S$GLB,, | Performed by: PHYSICIAN ASSISTANT

## 2023-06-09 PROCEDURE — 99214 PR OFFICE/OUTPT VISIT, EST, LEVL IV, 30-39 MIN: ICD-10-PCS | Mod: S$GLB,,, | Performed by: PHYSICIAN ASSISTANT

## 2023-06-09 PROCEDURE — 3066F PR DOCUMENTATION OF TREATMENT FOR NEPHROPATHY: ICD-10-PCS | Mod: CPTII,S$GLB,, | Performed by: PHYSICIAN ASSISTANT

## 2023-06-09 PROCEDURE — 99999 PR PBB SHADOW E&M-EST. PATIENT-LVL IV: ICD-10-PCS | Mod: PBBFAC,,, | Performed by: PHYSICIAN ASSISTANT

## 2023-06-09 PROCEDURE — 1160F PR REVIEW ALL MEDS BY PRESCRIBER/CLIN PHARMACIST DOCUMENTED: ICD-10-PCS | Mod: CPTII,S$GLB,, | Performed by: PHYSICIAN ASSISTANT

## 2023-06-09 PROCEDURE — 3079F DIAST BP 80-89 MM HG: CPT | Mod: CPTII,S$GLB,, | Performed by: PHYSICIAN ASSISTANT

## 2023-06-09 PROCEDURE — 3008F PR BODY MASS INDEX (BMI) DOCUMENTED: ICD-10-PCS | Mod: CPTII,S$GLB,, | Performed by: PHYSICIAN ASSISTANT

## 2023-06-09 PROCEDURE — 3066F NEPHROPATHY DOC TX: CPT | Mod: CPTII,S$GLB,, | Performed by: PHYSICIAN ASSISTANT

## 2023-06-09 PROCEDURE — 3046F HEMOGLOBIN A1C LEVEL >9.0%: CPT | Mod: CPTII,S$GLB,, | Performed by: PHYSICIAN ASSISTANT

## 2023-06-09 PROCEDURE — 1159F MED LIST DOCD IN RCRD: CPT | Mod: CPTII,S$GLB,, | Performed by: PHYSICIAN ASSISTANT

## 2023-06-09 NOTE — PROGRESS NOTES
"Subjective:      Patient ID: Maikol Pacheco is a 42 y.o. female.    Chief Complaint: Hyperlipidemia, Hypertension, and Diabetes    Patient is new to me, being seen today for 3mth f/u.   Denies any current concerns/complaints     HTN- losartan 50mg, controlled   HLD- on statin therapy   DM- A1c 10.8%, levemir 30units bid, novolog 32 units tid, actos 15mg, ozempic 2mg, followed by Diabetes, seen last week, meds adjusted   Has appt for dexcom, glucometer lost so not checking currently     Bariatric surgery 8/6 (gastric sleeve), starts liquid diet 8/22     Labs recently completed, diabetes remains uncontrolled, + protein in urine, cholesterol acceptable, sodium low but stable     Last visit Feb 2023 with PCP.     Review of Systems   Constitutional:  Negative for chills, diaphoresis and fever.   HENT:  Negative for congestion, rhinorrhea and sore throat.    Respiratory:  Negative for cough, shortness of breath and wheezing.    Cardiovascular:  Negative for chest pain and leg swelling.   Gastrointestinal:  Negative for abdominal pain, constipation, diarrhea, nausea and vomiting.   Skin:  Negative for rash.   Neurological:  Negative for dizziness, light-headedness and headaches.     Objective:   /84   Pulse 100   Temp 97 °F (36.1 °C) (Tympanic)   Resp 20   Ht 5' 2" (1.575 m)   Wt 108.1 kg (238 lb 5.1 oz)   LMP 05/08/2023 (Approximate)   SpO2 98%   BMI 43.59 kg/m²   Physical Exam  Constitutional:       General: She is not in acute distress.     Appearance: Normal appearance. She is well-developed. She is not ill-appearing.   HENT:      Head: Normocephalic and atraumatic.   Cardiovascular:      Rate and Rhythm: Normal rate and regular rhythm.      Pulses:           Dorsalis pedis pulses are 2+ on the right side and 2+ on the left side.      Heart sounds: Normal heart sounds. No murmur heard.  Pulmonary:      Effort: Pulmonary effort is normal. No respiratory distress.      Breath sounds: Normal breath " sounds. No decreased breath sounds.   Abdominal:      General: Bowel sounds are normal.      Palpations: Abdomen is soft.      Tenderness: There is no abdominal tenderness.   Musculoskeletal:      Right lower leg: No edema.      Left lower leg: No edema.   Feet:      Right foot:      Protective Sensation: 10 sites tested.  10 sites sensed.      Skin integrity: Skin integrity normal.      Left foot:      Protective Sensation: 10 sites tested.  10 sites sensed.      Skin integrity: Skin integrity normal.   Skin:     General: Skin is warm and dry.      Findings: No rash.   Psychiatric:         Speech: Speech normal.         Behavior: Behavior normal.         Thought Content: Thought content normal.     Assessment:      1. Hyperlipidemia associated with type 2 diabetes mellitus    2. Hypertension goal BP (blood pressure) < 130/80    3. Uncontrolled type 2 diabetes mellitus with hyperglycemia, with long-term current use of insulin    4. Morbid obesity with BMI of 45.0-49.9, adult       Plan:   Hyperlipidemia associated with type 2 diabetes mellitus  -     Lipid Panel; Future; Expected date: 06/09/2023    Hypertension goal BP (blood pressure) < 130/80  -     CBC Auto Differential; Future; Expected date: 06/09/2023  -     Comprehensive Metabolic Panel; Future; Expected date: 06/09/2023    Uncontrolled type 2 diabetes mellitus with hyperglycemia, with long-term current use of insulin  -     Hemoglobin A1C; Future; Expected date: 06/09/2023    Morbid obesity with BMI of 45.0-49.9, adult      Avoid NSAIDs    Has eye exam scheduled    Mammo to be scheduled     Monitor BP and BS daily     Keep appts with Diabetes and Dietician later this month     3mth f/u with fasting labs prior

## 2023-06-12 DIAGNOSIS — E11.65 UNCONTROLLED TYPE 2 DIABETES MELLITUS WITH HYPERGLYCEMIA, WITH LONG-TERM CURRENT USE OF INSULIN: Primary | ICD-10-CM

## 2023-06-12 DIAGNOSIS — Z79.4 UNCONTROLLED TYPE 2 DIABETES MELLITUS WITH HYPERGLYCEMIA, WITH LONG-TERM CURRENT USE OF INSULIN: Primary | ICD-10-CM

## 2023-06-13 LAB — HBA1C MFR BLD: 11 %

## 2023-06-14 ENCOUNTER — CLINICAL SUPPORT (OUTPATIENT)
Dept: DIABETES | Facility: CLINIC | Age: 43
End: 2023-06-14
Payer: COMMERCIAL

## 2023-06-14 DIAGNOSIS — E11.65 UNCONTROLLED TYPE 2 DIABETES MELLITUS WITH HYPERGLYCEMIA, WITH LONG-TERM CURRENT USE OF INSULIN: ICD-10-CM

## 2023-06-14 DIAGNOSIS — Z79.4 UNCONTROLLED TYPE 2 DIABETES MELLITUS WITH HYPERGLYCEMIA, WITH LONG-TERM CURRENT USE OF INSULIN: ICD-10-CM

## 2023-06-14 PROCEDURE — 95249 CONT GLUC MNTR PT PROV EQP: CPT | Mod: S$GLB,,, | Performed by: DIETITIAN, REGISTERED

## 2023-06-14 PROCEDURE — 95249 PR GLUCOSE MONITORING, 72 HRS, SUB-Q SENSOR, PATIENT PROVIDED: ICD-10-PCS | Mod: S$GLB,,, | Performed by: DIETITIAN, REGISTERED

## 2023-06-14 PROCEDURE — 99999 PR PBB SHADOW E&M-EST. PATIENT-LVL II: ICD-10-PCS | Mod: PBBFAC,,, | Performed by: DIETITIAN, REGISTERED

## 2023-06-14 PROCEDURE — 99999 PR PBB SHADOW E&M-EST. PATIENT-LVL II: CPT | Mod: PBBFAC,,, | Performed by: DIETITIAN, REGISTERED

## 2023-06-14 NOTE — PROGRESS NOTES
Diabetes Care Specialist Progress Note  Author: Ayla Wilks RD, CDE  Date: 6/14/2023    Program Intake  Reason for Diabetes Program Visit:: Intervention  Type of Intervention:: Individual  Individual: Device Training  Device Training: Personal CGM  Current diabetes risk level:: high  In the last 12 months, have you:: none  Permission to speak with others about care:: no    Patient's blood sugar was low during today's visit, unable to complete entire assessment, will complete at follow-up visit in 2 weeks.     Lab Results   Component Value Date    HGBA1C 10.8 (H) 05/11/2023       Clinical       Problem Review  Reviewed Problem List with Patient: yes  Active comorbidities affecting diabetes self-care.: yes  Comorbidities: Hypertension    Clinical Assessment  Current Diabetes Treatment: Oral Medication, Injectable, Insulin  Have you ever experienced hypoglycemia (low blood sugar)?: yes  Are you able to tell when your blood sugar is low?: Yes  What symptoms do you experience?: Dizzy/Light-headed  Have you ever been hospitalized because your blood sugar was too low?: no  How do you treat hypoglycemia (low blood sugar)?: 1/2 can soda/fruit juice  Have you ever experienced hyperglycemia (high blood sugar)?: yes  In the last month, how often have you experienced high blood sugar?: once a day  Are you able to tell when your blood sugar is high?: Yes  What are your symptoms?: thirst  Have you ever been hospitalized because your blood sugar was high?: no         Additional Social History    Support  Does anyone support you with your diabetes care?: yes  Who supports you?: self, parent  Who takes you to your medical appointments?: self  Does the current support meet the patient's needs?: Yes  Is Support an area impacting ability to self-manage diabetes?: No    Access to Mass Media & Technology  Does the patient have access to any of the following devices or technologies?: Smart phone, Internet Access  Media or technology  needs impacting ability to self-manage diabetes?: No    Cognitive/Behavioral Health  Alert and Oriented: Yes  Difficulty Thinking: No  Requires Prompting: No  Requires assistance for routine expression?: No  Cognitive or behavioral barriers impacting ability to self-manage diabetes?: No    Culture/Confucianist  Culture or Zoroastrianism beliefs that may impact ability to access healthcare: No    Communication  Language preference: English  Hearing Problems: No  Vision Problems: No  Communication needs impacting ability to self-manage diabetes?: No    Health Literacy  Preferred Learning Method: Face to Face, Demonstration, Reading Materials  Health literacy needs impacting ability to self-manage diabetes?: No      Diabetes Self-Management Skills Assessment    Medications  Patient is able to describe current diabetes management routine.: yes  Diabetes management routine:: insulin, injectable medications, oral medications  Patient is able to identify current diabetes medications, dosages, and appropriate timing of medications.: yes (Actos 15mg QD // Ozempic 2mg q7d // Levemir 30u BID // Novolog 32u TID)  Patient understands the purpose of the medications taken for diabetes.: yes  Patient reports problems or concerns with current medication regimen.: no  Medication Skills Assessment Completed:: Yes  Assessment indicates:: Adequate understanding  Area of need?: No    Home Blood Glucose Monitoring  Patient states that blood sugar is checked at home daily.: yes  Monitoring Method:: personal continuous glucose monitor  Personal CGM type:: Dexcom G7  Patient is able to use personal CGM appropriately.: no (training today)  Home Blood Glucose Monitoring Skills Assessment Completed: : Yes  Assessment indicates:: Instruction Needed  Area of need?: Yes              Assessment Summary and Plan    Based on today's diabetes care assessment, the following areas of need were identified:      Social 6/14/2023   Support No   Access to [facility]  "Media/Tech No   Cognitive/Behavioral Health No   Culture/Roman Catholic No   Communication No   Health Literacy No              Diabetes Self-Management Skills 6/14/2023   Medication No- patient started a liquid diet next Thursday in preparation for Weight loss surgery. Will discuss medication adjustments with Piyush Posada at next visit on 6/20/23.    Home Blood Glucose Monitoring Yes- see care plan.   Dexcom G7 training today. Patient successfully sharing data with clinic.   Patient felt like her blood sugar was low during today's session. Provided patient with 2 peppermints to treat. After 30 minute warm-up, patient's blood sugar was 62 mg/dl with a steady arrow. Completed session so patient could get something to eat.            Today's interventions were provided through individual discussion, instruction, and written materials were provided.      Patient verbalized understanding of instruction and written materials.  Pt was able to return back demonstration of instructions today. Patient understood key points, needs reinforcement and further instruction.     Diabetes Self-Management Care Plan:    Today's Diabetes Self-Management Care Plan was developed with Maikol's input. Maikol has agreed to work toward the following goal(s) to improve his/her overall diabetes control.      Care Plan: Diabetes Management   Updates made since 5/15/2023 12:00 AM        Problem: Blood Glucose Self-Monitoring         Goal: Patient will use Dexcom G7 to monitor BG and make treatment decisions.    Start Date: 6/14/2023   Expected End Date: 7/14/2023   Priority: High   Barriers: No Barriers Identified        Task: Trained patient to use Dexcom G7. Completed 6/14/2023   Note:    DEXCOM G7 CGM TRAINING  Dexcom G7 & Clarity mobile apps downloaded to phone. Education was provided using "Quick Start Guide" and demo device per Dexcom protocol. Clarity clinic data share set-up.      Overview:  5 minute glucose reading updates, trending " arrows, BG graph screens, reports screen,  connectivity and functions.   Menus: Trend graph, start sensor, enter BG, events, alerts, settings, replace sensor, stop sensor, and shutdown   Settings:                          * Urgent Low: 55 mg/dL                          * Urgent Low Soon: On                          * Low Alert: 70 mg/dL; Snooze OFF                          * High Alert: 250 mg/dL; Snooze OFF                           * Rise Rate: Off                          * Fall Rate: Off                          * Signal Loss: On                          * Temporary Sensor Issue: On     Reviewed additional setting options.  Pt paired sensor with .    Reviewed where to find sensor insertion time and expiration date.   Reviewed appropriate calibration techniques.  Reviewed sensor site selection. Pt selected and prepped site using aseptic technique, inserted sensor, applied overlay tape and started session.   Reviewed sensor removal from site. Discussed times to remove sensor per  guidelines include MRI or diatherapy.   Patient able to demonstrate without difficulty. Encouraged to review manual and/or training videos prior to starting another sensor.   Reviewed problem solving aspects of sensor transmission/variables that can disrupt RF transmission.  range 20 feet, but the first 3 hrs keep within 3 feet of transmitter.  Pt instructed on lag time of interstitial fluid from CBG and was advised to tx hypoglycemia and dose insulin based on SMBG values.  Dexcom technical support contact number given and examples of when to contact them discussed.              Follow Up Plan     Follow up in about 2 weeks (around 6/28/2023) for E11.65.    Today's care plan and follow up schedule was discussed with patient.  Maikol verbalized understanding of the care plan, goals, and agrees to follow up plan.        The patient was encouraged to communicate with his/her health care  provider/physician and care team regarding his/her condition(s) and treatment.  I provided the patient with my contact information today and encouraged to contact me via phone or [facility] as needed.     Length of Visit   Total Time: 60 Minutes

## 2023-06-19 ENCOUNTER — PATIENT MESSAGE (OUTPATIENT)
Dept: DIABETES | Facility: CLINIC | Age: 43
End: 2023-06-19
Payer: COMMERCIAL

## 2023-06-20 ENCOUNTER — OFFICE VISIT (OUTPATIENT)
Dept: DIABETES | Facility: CLINIC | Age: 43
End: 2023-06-20
Payer: COMMERCIAL

## 2023-06-20 DIAGNOSIS — E11.69 HYPERLIPIDEMIA ASSOCIATED WITH TYPE 2 DIABETES MELLITUS: ICD-10-CM

## 2023-06-20 DIAGNOSIS — E66.01 MORBID OBESITY WITH BMI OF 45.0-49.9, ADULT: ICD-10-CM

## 2023-06-20 DIAGNOSIS — E78.5 HYPERLIPIDEMIA ASSOCIATED WITH TYPE 2 DIABETES MELLITUS: ICD-10-CM

## 2023-06-20 DIAGNOSIS — Z79.4 UNCONTROLLED TYPE 2 DIABETES MELLITUS WITH HYPERGLYCEMIA, WITH LONG-TERM CURRENT USE OF INSULIN: Primary | ICD-10-CM

## 2023-06-20 DIAGNOSIS — E11.65 UNCONTROLLED TYPE 2 DIABETES MELLITUS WITH HYPERGLYCEMIA, WITH LONG-TERM CURRENT USE OF INSULIN: Primary | ICD-10-CM

## 2023-06-20 DIAGNOSIS — I10 HYPERTENSION GOAL BP (BLOOD PRESSURE) < 130/80: ICD-10-CM

## 2023-06-20 PROCEDURE — 3046F HEMOGLOBIN A1C LEVEL >9.0%: CPT | Mod: CPTII,93,, | Performed by: PHYSICIAN ASSISTANT

## 2023-06-20 PROCEDURE — 3046F PR MOST RECENT HEMOGLOBIN A1C LEVEL > 9.0%: ICD-10-PCS | Mod: CPTII,93,, | Performed by: PHYSICIAN ASSISTANT

## 2023-06-20 PROCEDURE — 99213 PR OFFICE/OUTPT VISIT, EST, LEVL III, 20-29 MIN: ICD-10-PCS | Mod: 93,,, | Performed by: PHYSICIAN ASSISTANT

## 2023-06-20 PROCEDURE — 3066F PR DOCUMENTATION OF TREATMENT FOR NEPHROPATHY: ICD-10-PCS | Mod: CPTII,93,, | Performed by: PHYSICIAN ASSISTANT

## 2023-06-20 PROCEDURE — 3060F POS MICROALBUMINURIA REV: CPT | Mod: CPTII,93,, | Performed by: PHYSICIAN ASSISTANT

## 2023-06-20 PROCEDURE — 99213 OFFICE O/P EST LOW 20 MIN: CPT | Mod: 93,,, | Performed by: PHYSICIAN ASSISTANT

## 2023-06-20 PROCEDURE — 3060F PR POS MICROALBUMINURIA RESULT DOCUMENTED/REVIEW: ICD-10-PCS | Mod: CPTII,93,, | Performed by: PHYSICIAN ASSISTANT

## 2023-06-20 PROCEDURE — 3066F NEPHROPATHY DOC TX: CPT | Mod: CPTII,93,, | Performed by: PHYSICIAN ASSISTANT

## 2023-06-20 NOTE — PROGRESS NOTES
PCP: Zari Lim DO    Subjective:     Chief Complaint: Diabetes - Established Patient    Established Patient - Audio Only Telehealth Visit     The patient location is: Home  The chief complaint leading to consultation is: Diabetes follow up  Visit type: Virtual visit with audio only (telephone)  Total Time Spent with Patient: 15 minutes     The reason for the audio only service rather than synchronous audio and video virtual visit was related to technical difficulties or patient preference/necessity.     Each patient to whom I provide medical services by telemedicine is:  (1) informed of the relationship between the physician and patient and the respective role of any other health care provider with respect to management of the patient; and (2) notified that they may decline to receive medical services by telemedicine and may withdraw from such care at any time. Patient verbally consented to receive this service via voice-only telephone call.    This service was not originating from a related E/M service provided within the previous 7 days nor will  to an E/M service or procedure within the next 24 hours or my soonest available appointment.  Prevailing standard of care was able to be met in this audio-only visit.         HISTORY OF PRESENT ILLNESS: 42 y.o.   female presenting for diabetes management visit.   The patient's last visit with me was on 6/1/2023.  Patient has had Type II diabetes since 2010.  Pertinent to decision making is the following comorbidities: HTN, HLD and Obesity by BMI  Patient has the following Diabetes complications: with diabetic polyneuropathy  She  has attended diabetes education in the past.     Patient's most recent A1c of 10.8% was completed  6 weeks ago.   Patient states since Her last A1c Her blood glucose levels have been high  throughout the day .   Patient monitors blood glucose 4 times per day and Continuously with personal CGM Dexcom.   Patient blood  glucose monitoring device will be uploaded into Media Section today.       Patient reports show some near hypoglycemia from basal use and some mild to moderate postprandial hyperglycemia throughout the day.   Patient endorses the following diabetes related symptoms:  None .   Patient is due today for the following diabetes-related health maintenance standards: COVID-19 Vaccine  and Mammogram . Patient denies urgent issues with feet.   She denies recent hospital admissions or emergency room visits.   She denies having hypoglycemia.   Patient's concerns today include glycemic control. Of note, patient scheduled for gastric sleeve on July 6, 2024 - liquid diet to begin 6/22.   Patient medication regimen is as below.     CURRENT DM MEDICATIONS:    Levemir 30 units twice a day  Novolog 32 units with meals TID   Ozempic 2 mg weekly  Actos 15 mg     Patient has failed the following Diabetes medications:   Metformin   Trulicity       Labs Reviewed.       Lab Results   Component Value Date    CPEPTIDE 2.2 12/29/2016     No results found for: GLUTAMICACID       //   , There is no height or weight on file to calculate BMI.  Her blood sugar in clinic today is:    Lab Results   Component Value Date    POCGLU 77 10/14/2019       Review of Systems   Constitutional:  Negative for activity change, appetite change, chills and fever.   HENT:  Negative for dental problem, mouth sores, nosebleeds, sore throat and trouble swallowing.    Eyes:  Negative for pain and discharge.   Respiratory:  Negative for shortness of breath, wheezing and stridor.    Cardiovascular:  Negative for chest pain, palpitations and leg swelling.   Gastrointestinal:  Negative for abdominal pain, diarrhea, nausea and vomiting.   Endocrine: Negative for polydipsia, polyphagia and polyuria.   Genitourinary:  Negative for dysuria, frequency and urgency.   Musculoskeletal:  Negative for joint swelling and myalgias.   Skin:  Negative for rash and wound.   Neurological:   Negative for dizziness, syncope, weakness and headaches.   Psychiatric/Behavioral:  Negative for behavioral problems and dysphoric mood.        Diabetes Management Status  Statin: Taking  ACE/ARB: Taking    Screening or Prevention Patient's value Goal Complete/Controlled?   HgA1C Testing and Control   Lab Results   Component Value Date    HGBA1C 10.8 (H) 2023      Annually/Less than 8% No   Lipid profile : 2023 Annually Yes   LDL control Lab Results   Component Value Date    LDLCALC 117.0 2023    Annually/Less than 100 mg/dl  No   Nephropathy screening Lab Results   Component Value Date    MICALBCREAT 49.2 (H) 2023     Lab Results   Component Value Date    PROTEINUA Negative 2021    Annually Yes   Blood pressure BP Readings from Last 1 Encounters:   23 132/84    Less than 140/90 Yes   Dilated retinal exam : 10/16/2020 Annually Yes    Foot exam   : 2023 Annually Yes     Social History     Socioeconomic History    Marital status: Single    Number of children: 0   Occupational History     Employer: [employer]   Tobacco Use    Smoking status: Former     Years: 20.00     Types: Cigarettes     Quit date:      Years since quittin.4    Smokeless tobacco: Never   Substance and Sexual Activity    Alcohol use: Not Currently     Comment: socially    Drug use: No    Sexual activity: Yes     Partners: Male     Birth control/protection: Condom   Social History Narrative    Single. Lives with mom. Has no children. 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      Past Medical History:   Diagnosis Date    Allergy     Anemia     Anxiety     Depression     Diabetes mellitus type II 2008    Gastroparesis     Hyperlipidemia     Hypertension     Morbid obesity     Neuromuscular disorder     Osteoarthritis of spine with radiculopathy, lumbar region 2021       Objective:        Physical Exam  Neurological:      Mental Status: She is alert and oriented to person, place, and time.  Mental status is at baseline.   Psychiatric:         Mood and Affect: Mood normal.         Behavior: Behavior normal.         Thought Content: Thought content normal.         Judgment: Judgment normal.       Physical Exam limited secondary to Telemedicine visit    Assessment / Plan:     Uncontrolled type 2 diabetes mellitus with hyperglycemia, with long-term current use of insulin    Hypertension goal BP (blood pressure) < 130/80    Hyperlipidemia associated with type 2 diabetes mellitus    Morbid obesity with BMI of 45.0-49.9, adult          Additional Plan Details:    - POCT Glucose  - Encouraged continuation of lifestyle changes including regular exercise and limiting carbohydrates to 30-45 grams per meal threes times daily and 15 grams per snack with a limit of two daily.   - Encouraged continued monitoring of blood glucose with maintenance of 4 times daily at Fasting, Before Lunch, Before Dinner, and Before Bed. Dexcom G7 pending.  - Current DM Medication Regimen: Continue Ozempic 2 mg weekly - will start hold 1 week before surgery on 6/29. Change Levemir 10 units BID - weight based basal dosing. Change Novolog correction dosing every 3 hours as needed. Continue Actos 15 mg daily.   - Health Maintenance standards addressed today: COVID - 19 Vaccine - patient will schedule outside of [facility]  and Mammogram scheduled   - Nursing Visit: Patient is age 79 or younger with an A1c of 7.5 or greater and  will defer NV until after A1c .   - Follow up in 2 weeks with A1c prior.     CURRENT DM MEDICATIONS:    Levemir 10 units twice a day - based on weight based basal dosing  Novolog correction dosing every 3 hours as needed  Ozempic 2 mg weekly  Actos 15 mg     Novolog Correction Dosing every 3 hours as needed OUTSIDE OF EATING  If  - 250, may take 2 units of Novolog  If  - 300, may take 4 units of Novolog  If  - 350, may take 6 units of Novolog  If  - 400, may take 8 units of Novolog  If +,  may take 10 units of Novolog    Blakeney McKnight, PA-C [facility]

## 2023-06-21 ENCOUNTER — OFFICE VISIT (OUTPATIENT)
Dept: OPHTHALMOLOGY | Facility: CLINIC | Age: 43
End: 2023-06-21
Payer: COMMERCIAL

## 2023-06-21 ENCOUNTER — PATIENT MESSAGE (OUTPATIENT)
Dept: OPHTHALMOLOGY | Facility: CLINIC | Age: 43
End: 2023-06-21

## 2023-06-21 ENCOUNTER — PATIENT MESSAGE (OUTPATIENT)
Dept: DIABETES | Facility: CLINIC | Age: 43
End: 2023-06-21
Payer: COMMERCIAL

## 2023-06-21 DIAGNOSIS — E11.9 DIABETES MELLITUS TYPE 2 WITHOUT RETINOPATHY: Primary | ICD-10-CM

## 2023-06-21 DIAGNOSIS — H52.7 REFRACTIVE ERRORS: ICD-10-CM

## 2023-06-21 PROCEDURE — 3046F HEMOGLOBIN A1C LEVEL >9.0%: CPT | Mod: CPTII,S$GLB,, | Performed by: OPTOMETRIST

## 2023-06-21 PROCEDURE — 1159F MED LIST DOCD IN RCRD: CPT | Mod: CPTII,S$GLB,, | Performed by: OPTOMETRIST

## 2023-06-21 PROCEDURE — 3060F PR POS MICROALBUMINURIA RESULT DOCUMENTED/REVIEW: ICD-10-PCS | Mod: CPTII,S$GLB,, | Performed by: OPTOMETRIST

## 2023-06-21 PROCEDURE — 1159F PR MEDICATION LIST DOCUMENTED IN MEDICAL RECORD: ICD-10-PCS | Mod: CPTII,S$GLB,, | Performed by: OPTOMETRIST

## 2023-06-21 PROCEDURE — 99999 PR PBB SHADOW E&M-EST. PATIENT-LVL III: CPT | Mod: PBBFAC,,, | Performed by: OPTOMETRIST

## 2023-06-21 PROCEDURE — 92015 PR REFRACTION: ICD-10-PCS | Mod: S$GLB,,, | Performed by: OPTOMETRIST

## 2023-06-21 PROCEDURE — 3066F PR DOCUMENTATION OF TREATMENT FOR NEPHROPATHY: ICD-10-PCS | Mod: CPTII,S$GLB,, | Performed by: OPTOMETRIST

## 2023-06-21 PROCEDURE — 92014 PR EYE EXAM, EST PATIENT,COMPREHESV: ICD-10-PCS | Mod: S$GLB,,, | Performed by: OPTOMETRIST

## 2023-06-21 PROCEDURE — 99999 PR PBB SHADOW E&M-EST. PATIENT-LVL III: ICD-10-PCS | Mod: PBBFAC,,, | Performed by: OPTOMETRIST

## 2023-06-21 PROCEDURE — 3060F POS MICROALBUMINURIA REV: CPT | Mod: CPTII,S$GLB,, | Performed by: OPTOMETRIST

## 2023-06-21 PROCEDURE — 3066F NEPHROPATHY DOC TX: CPT | Mod: CPTII,S$GLB,, | Performed by: OPTOMETRIST

## 2023-06-21 PROCEDURE — 92015 DETERMINE REFRACTIVE STATE: CPT | Mod: S$GLB,,, | Performed by: OPTOMETRIST

## 2023-06-21 PROCEDURE — 92014 COMPRE OPH EXAM EST PT 1/>: CPT | Mod: S$GLB,,, | Performed by: OPTOMETRIST

## 2023-06-21 PROCEDURE — 3046F PR MOST RECENT HEMOGLOBIN A1C LEVEL > 9.0%: ICD-10-PCS | Mod: CPTII,S$GLB,, | Performed by: OPTOMETRIST

## 2023-06-21 NOTE — PROGRESS NOTES
HPI    Last visit with TRF on 06/05/2019.    Lab Results       Component                Value               Date                       HGBA1C                   10.8 (H)            05/11/2023              Patient notice a change with distance vision.  Sometimes trouble with dry eyes.  Not using any otc drops.  Wear SVL glasses  Last edited by Camille Diallo on 6/21/2023  2:58 PM.            Assessment /Plan     For exam results, see Encounter Report.    Diabetes mellitus type 2 without retinopathy    Refractive errors      No Background Diabetic Retinopathy  Strict BG control, f/u w/ PCP, and annual DFE  Stressed importance of DM control to preserve vision    Dispense Final Rx for glasses.  RTC 1 year  Discussed above and answered questions.

## 2023-06-21 NOTE — PATIENT INSTRUCTIONS
CURRENT DM MEDICATIONS:    Levemir 10 units twice a day - based on weight based basal dosing  Novolog correction dosing every 3 hours as needed  Ozempic 2 mg weekly  Actos 15 mg     Novolog Correction Dosing every 3 hours as needed OUTSIDE OF EATING  If  - 250, may take 2 units of Novolog  If  - 300, may take 4 units of Novolog  If  - 350, may take 6 units of Novolog  If  - 400, may take 8 units of Novolog  If +, may take 10 units of Novolog

## 2023-06-22 ENCOUNTER — PATIENT MESSAGE (OUTPATIENT)
Dept: DIABETES | Facility: CLINIC | Age: 43
End: 2023-06-22
Payer: COMMERCIAL

## 2023-06-23 ENCOUNTER — TELEPHONE (OUTPATIENT)
Dept: DIABETES | Facility: CLINIC | Age: 43
End: 2023-06-23
Payer: COMMERCIAL

## 2023-06-23 NOTE — TELEPHONE ENCOUNTER
Called patient and she states she started the liquid diet yesterday. Patient states she took the Levemir once yesterday and today because her BG's are running a little below 100 or a little above 100. Patient is currently holding the Novolog and will be taking the Ozempic and Actos later today.

## 2023-06-23 NOTE — TELEPHONE ENCOUNTER
GLENDY De Jesus MA  Caller: Unspecified (Today, 11:17 AM)  Okay great! Tell her to continue that and I will check her stuff next week to make sure everything still looks okay    Called patient and left a message

## 2023-06-23 NOTE — TELEPHONE ENCOUNTER
----- Message from Piyush Posada PA-C sent at 6/23/2023 10:12 AM CDT -----  Please call pt to tell her I tried to message her yesterday about liquid diet but she has not read message on mychart. Please make sure he is taking as following (see below). IF not, please get exact units she is taking. Her dexcom has looked good so far but wanted to chck in before the weekend.       CURRENT DM MEDICATIONS:    · Levemir 10 units twice a day - based on weight based basal dosing  · Novolog correction dosing every 3 hours as needed  · Ozempic 2 mg weekly  · Actos 15 mg      Novolog Correction Dosing every 3 hours as needed OUTSIDE OF EATING  If  - 250, may take 2 units of Novolog  If  - 300, may take 4 units of Novolog  If  - 350, may take 6 units of Novolog  If  - 400, may take 8 units of Novolog  If +, may take 10 units of Novolog

## 2023-06-23 NOTE — TELEPHONE ENCOUNTER
----- Message from Piyush Posada PA-C sent at 6/21/2023  4:15 PM CDT -----  Please pull her clarity

## 2023-06-28 ENCOUNTER — HOSPITAL ENCOUNTER (OUTPATIENT)
Dept: RADIOLOGY | Facility: HOSPITAL | Age: 43
Discharge: HOME | End: 2023-06-28
Attending: INTERNAL MEDICINE
Payer: COMMERCIAL

## 2023-06-28 ENCOUNTER — TELEPHONE (OUTPATIENT)
Dept: DIABETES | Facility: CLINIC | Age: 43
End: 2023-06-28
Payer: COMMERCIAL

## 2023-06-28 ENCOUNTER — PATIENT MESSAGE (OUTPATIENT)
Dept: DIABETES | Facility: CLINIC | Age: 43
End: 2023-06-28
Payer: COMMERCIAL

## 2023-06-28 VITALS — HEIGHT: 62 IN | BODY MASS INDEX: 43.82 KG/M2 | WEIGHT: 238.13 LBS

## 2023-06-28 DIAGNOSIS — Z12.31 OTHER SCREENING MAMMOGRAM: ICD-10-CM

## 2023-06-28 PROCEDURE — 77063 BREAST TOMOSYNTHESIS BI: CPT | Mod: 26,,, | Performed by: RADIOLOGY

## 2023-06-28 PROCEDURE — 77067 MAMMO DIGITAL SCREENING BILAT WITH TOMO: ICD-10-PCS | Mod: 26,,, | Performed by: RADIOLOGY

## 2023-06-28 PROCEDURE — 77067 SCR MAMMO BI INCL CAD: CPT | Mod: TC

## 2023-06-28 PROCEDURE — 77063 MAMMO DIGITAL SCREENING BILAT WITH TOMO: ICD-10-PCS | Mod: 26,,, | Performed by: RADIOLOGY

## 2023-06-28 PROCEDURE — 77067 SCR MAMMO BI INCL CAD: CPT | Mod: 26,,, | Performed by: RADIOLOGY

## 2023-06-28 NOTE — TELEPHONE ENCOUNTER
----- Message from Piyush Posada PA-C sent at 6/28/2023  9:00 AM CDT -----  Please let pt know her blood sugars are still doing well. She should continue current regimen and plan to skip ozempic as planned. Tell her that once she skips ozempic we may need to adjust her insulin - I will look at her Dex on Friday afternoon and let her know     ----- Message -----  From: Piyush Posada PA-C  Sent: 6/28/2023  12:00 AM CDT  To: Piyush Posada PA-C    Check her dex on liquid diet

## 2023-07-03 ENCOUNTER — PATIENT MESSAGE (OUTPATIENT)
Dept: ADMINISTRATIVE | Facility: OTHER | Age: 43
End: 2023-07-03
Payer: COMMERCIAL

## 2023-07-07 ENCOUNTER — OFFICE VISIT (OUTPATIENT)
Dept: DIABETES | Facility: CLINIC | Age: 43
End: 2023-07-07
Payer: COMMERCIAL

## 2023-07-07 DIAGNOSIS — E66.01 MORBID OBESITY: ICD-10-CM

## 2023-07-07 DIAGNOSIS — Z53.21 PATIENT LEFT WITHOUT BEING SEEN: Primary | ICD-10-CM

## 2023-07-07 DIAGNOSIS — E11.69 HYPERLIPIDEMIA ASSOCIATED WITH TYPE 2 DIABETES MELLITUS: ICD-10-CM

## 2023-07-07 DIAGNOSIS — E78.5 HYPERLIPIDEMIA ASSOCIATED WITH TYPE 2 DIABETES MELLITUS: ICD-10-CM

## 2023-07-07 DIAGNOSIS — Z79.4 UNCONTROLLED TYPE 2 DIABETES MELLITUS WITH HYPERGLYCEMIA, WITH LONG-TERM CURRENT USE OF INSULIN: ICD-10-CM

## 2023-07-07 DIAGNOSIS — I10 HYPERTENSION GOAL BP (BLOOD PRESSURE) < 130/80: ICD-10-CM

## 2023-07-07 DIAGNOSIS — E11.65 UNCONTROLLED TYPE 2 DIABETES MELLITUS WITH HYPERGLYCEMIA, WITH LONG-TERM CURRENT USE OF INSULIN: ICD-10-CM

## 2023-07-07 PROCEDURE — 99499 NO LOS: ICD-10-PCS | Mod: 93,,, | Performed by: PHYSICIAN ASSISTANT

## 2023-07-07 PROCEDURE — 99499 UNLISTED E&M SERVICE: CPT | Mod: 93,,, | Performed by: PHYSICIAN ASSISTANT

## 2023-07-11 ENCOUNTER — PATIENT MESSAGE (OUTPATIENT)
Dept: DIABETES | Facility: CLINIC | Age: 43
End: 2023-07-11
Payer: COMMERCIAL

## 2023-07-14 ENCOUNTER — OFFICE VISIT (OUTPATIENT)
Dept: DIABETES | Facility: CLINIC | Age: 43
End: 2023-07-14
Payer: COMMERCIAL

## 2023-07-14 DIAGNOSIS — E11.65 UNCONTROLLED TYPE 2 DIABETES MELLITUS WITH HYPERGLYCEMIA, WITH LONG-TERM CURRENT USE OF INSULIN: Primary | ICD-10-CM

## 2023-07-14 DIAGNOSIS — E11.69 HYPERLIPIDEMIA ASSOCIATED WITH TYPE 2 DIABETES MELLITUS: ICD-10-CM

## 2023-07-14 DIAGNOSIS — E78.5 HYPERLIPIDEMIA ASSOCIATED WITH TYPE 2 DIABETES MELLITUS: ICD-10-CM

## 2023-07-14 DIAGNOSIS — E66.01 MORBID OBESITY: ICD-10-CM

## 2023-07-14 DIAGNOSIS — Z79.4 UNCONTROLLED TYPE 2 DIABETES MELLITUS WITH HYPERGLYCEMIA, WITH LONG-TERM CURRENT USE OF INSULIN: Primary | ICD-10-CM

## 2023-07-14 DIAGNOSIS — I10 HYPERTENSION GOAL BP (BLOOD PRESSURE) < 130/80: ICD-10-CM

## 2023-07-14 PROCEDURE — 3046F PR MOST RECENT HEMOGLOBIN A1C LEVEL > 9.0%: ICD-10-PCS | Mod: CPTII,93,, | Performed by: PHYSICIAN ASSISTANT

## 2023-07-14 PROCEDURE — 3066F NEPHROPATHY DOC TX: CPT | Mod: CPTII,93,, | Performed by: PHYSICIAN ASSISTANT

## 2023-07-14 PROCEDURE — 3060F PR POS MICROALBUMINURIA RESULT DOCUMENTED/REVIEW: ICD-10-PCS | Mod: CPTII,93,, | Performed by: PHYSICIAN ASSISTANT

## 2023-07-14 PROCEDURE — 3060F POS MICROALBUMINURIA REV: CPT | Mod: CPTII,93,, | Performed by: PHYSICIAN ASSISTANT

## 2023-07-14 PROCEDURE — 4010F ACE/ARB THERAPY RXD/TAKEN: CPT | Mod: CPTII,93,, | Performed by: PHYSICIAN ASSISTANT

## 2023-07-14 PROCEDURE — 4010F PR ACE/ARB THEARPY RXD/TAKEN: ICD-10-PCS | Mod: CPTII,93,, | Performed by: PHYSICIAN ASSISTANT

## 2023-07-14 PROCEDURE — 3066F PR DOCUMENTATION OF TREATMENT FOR NEPHROPATHY: ICD-10-PCS | Mod: CPTII,93,, | Performed by: PHYSICIAN ASSISTANT

## 2023-07-14 PROCEDURE — 99214 OFFICE O/P EST MOD 30 MIN: CPT | Mod: 93,,, | Performed by: PHYSICIAN ASSISTANT

## 2023-07-14 PROCEDURE — 3046F HEMOGLOBIN A1C LEVEL >9.0%: CPT | Mod: CPTII,93,, | Performed by: PHYSICIAN ASSISTANT

## 2023-07-14 PROCEDURE — 99214 PR OFFICE/OUTPT VISIT, EST, LEVL IV, 30-39 MIN: ICD-10-PCS | Mod: 93,,, | Performed by: PHYSICIAN ASSISTANT

## 2023-07-14 NOTE — PATIENT INSTRUCTIONS
CURRENT DM MEDICATIONS:    Levemir 15 units twice daily  Novolog correction dosing every 3 hours as needed  Actos 15 mg     Novolog Correction Dosing every 3 hours as needed OUTSIDE OF EATING  If  - 250, may take 2 units of Novolog  If  - 300, may take 4 units of Novolog  If  - 350, may take 6 units of Novolog  If  - 400, may take 8 units of Novolog  If +, may take 10 units of Novolog

## 2023-07-14 NOTE — PROGRESS NOTES
PCP: Zari Lim DO    Subjective:     Chief Complaint: Diabetes - Established Patient    Established Patient - Audio Only Telehealth Visit     The patient location is: Home  The chief complaint leading to consultation is: Diabetes follow up  Visit type: Virtual visit with audio only (telephone)  Total Time Spent with Patient: 6 minutes     The reason for the audio only service rather than synchronous audio and video virtual visit was related to technical difficulties or patient preference/necessity.     Each patient to whom I provide medical services by telemedicine is:  (1) informed of the relationship between the physician and patient and the respective role of any other health care provider with respect to management of the patient; and (2) notified that they may decline to receive medical services by telemedicine and may withdraw from such care at any time. Patient verbally consented to receive this service via voice-only telephone call.    This service was not originating from a related E/M service provided within the previous 7 days nor will  to an E/M service or procedure within the next 24 hours or my soonest available appointment.  Prevailing standard of care was able to be met in this audio-only visit.         HISTORY OF PRESENT ILLNESS: 42 y.o.   female presenting for diabetes management visit.   The patient's last visit with me was on 7/7/2023.  Patient has had Type II diabetes since 2010.  Pertinent to decision making is the following comorbidities: HTN, HLD and Obesity by BMI  Patient has the following Diabetes complications: with diabetic polyneuropathy  She  has attended diabetes education in the past.     Patient's most recent A1c of 10.8% was completed 2 months ago.   Patient states since Her last A1c Her blood glucose levels have been high  throughout the day .   Patient monitors blood glucose 4 times per day and Continuously with personal CGM Dexcom.   Patient blood  glucose monitoring device will not be uploaded into Media Section today. Pt needs to restart today.   Per patient, all blood sugars ranging from 198 - 250.   Patient endorses the following diabetes related symptoms:  None .   Patient is due today for the following diabetes-related health maintenance standards: COVID-19 Vaccine .   She denies recent hospital admissions or emergency room visits.   She denies having hypoglycemia.   Patient's concerns today include glycemic control. Of note, patient had gastric sleeve on July 6, 2024.  Patient medication regimen is as below.     CURRENT DM MEDICATIONS:    Levemir 10 units twice a day  Novolog correction dosing every 3 hours as below  Actos 15 mg     Novolog Correction Dosing every 3 hours as needed OUTSIDE OF EATING  If  - 250, may take 2 units of Novolog  If  - 300, may take 4 units of Novolog  If  - 350, may take 6 units of Novolog  If  - 400, may take 8 units of Novolog  If +, may take 10 units of Novolog    Patient has failed the following Diabetes medications:   Metformin   Trulicity / Ozempic - stopped after bariatric surgery      Labs Reviewed.       Lab Results   Component Value Date    CPEPTIDE 2.2 12/29/2016     No results found for: GLUTAMICACID       //   , There is no height or weight on file to calculate BMI.  Her blood sugar in clinic today is:    Lab Results   Component Value Date    POCGLU 77 10/14/2019       Review of Systems   Constitutional:  Negative for activity change, appetite change, chills and fever.   HENT:  Negative for dental problem, mouth sores, nosebleeds, sore throat and trouble swallowing.    Eyes:  Negative for pain and discharge.   Respiratory:  Negative for shortness of breath, wheezing and stridor.    Cardiovascular:  Negative for chest pain, palpitations and leg swelling.   Gastrointestinal:  Negative for abdominal pain, diarrhea, nausea and vomiting.   Endocrine: Negative for polydipsia, polyphagia  and polyuria.   Genitourinary:  Negative for dysuria, frequency and urgency.   Musculoskeletal:  Negative for joint swelling and myalgias.   Skin:  Negative for rash and wound.   Neurological:  Negative for dizziness, syncope, weakness and headaches.   Psychiatric/Behavioral:  Negative for behavioral problems and dysphoric mood.        Diabetes Management Status  Statin: Taking  ACE/ARB: Taking    Screening or Prevention Patient's value Goal Complete/Controlled?   HgA1C Testing and Control   Lab Results   Component Value Date    HGBA1C 10.8 (H) 2023      Annually/Less than 8% No   Lipid profile : 2023 Annually Yes   LDL control Lab Results   Component Value Date    LDLCALC 117.0 2023    Annually/Less than 100 mg/dl  No   Nephropathy screening Lab Results   Component Value Date    MICALBCREAT 49.2 (H) 2023     Lab Results   Component Value Date    PROTEINUA Negative 2021    Annually Yes   Blood pressure BP Readings from Last 1 Encounters:   23 132/84    Less than 140/90 Yes   Dilated retinal exam : 2023 Annually Yes    Foot exam   : 2023 Annually Yes     Social History     Socioeconomic History    Marital status: Single    Number of children: 0   Occupational History     Employer: [employer]   Tobacco Use    Smoking status: Former     Years: 20.00     Types: Cigarettes     Quit date:      Years since quittin.5    Smokeless tobacco: Never   Substance and Sexual Activity    Alcohol use: Not Currently     Comment: socially    Drug use: No    Sexual activity: Yes     Partners: Male     Birth control/protection: Condom   Social History Narrative    Single. Lives with mom. Has no children. 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      Past Medical History:   Diagnosis Date    Allergy     Anemia     Anxiety     Depression     Diabetes mellitus type II 2008    Gastroparesis     Hyperlipidemia     Hypertension     Morbid obesity     Neuromuscular disorder      Osteoarthritis of spine with radiculopathy, lumbar region 06/01/2021       Objective:        Physical Exam  Neurological:      Mental Status: She is alert and oriented to person, place, and time. Mental status is at baseline.   Psychiatric:         Mood and Affect: Mood normal.         Behavior: Behavior normal.         Thought Content: Thought content normal.         Judgment: Judgment normal.       Physical Exam limited secondary to Telemedicine visit    Assessment / Plan:     Uncontrolled type 2 diabetes mellitus with hyperglycemia, with long-term current use of insulin    Hypertension goal BP (blood pressure) < 130/80    Hyperlipidemia associated with type 2 diabetes mellitus    Morbid obesity          Additional Plan Details:    - POCT Glucose  - Encouraged continuation of lifestyle changes including regular exercise and limiting carbohydrates to 30-45 grams per meal threes times daily and 15 grams per snack with a limit of two daily.   - Encouraged continued monitoring of blood glucose with maintenance of 4 times daily and Continuously with personal CGM Dexcom.   - Current DM Medication Regimen: Change Levemir 15 units BID - weight based basal dosing. Change Novolog correction dosing every 3 hours as needed. Continue Actos 15 mg daily.   - Health Maintenance standards addressed today: COVID - 19 Vaccine - patient will schedule outside of [facility]   - Nursing Visit: Patient is age 79 or younger with an A1c of 7.5 or greater and  will defer NV until after A1c .   - Follow up in 5 days with A1c prior.     CURRENT DM MEDICATIONS:    Levemir 15 units twice daily  Novolog correction dosing every 3 hours as needed  Actos 15 mg     Novolog Correction Dosing every 3 hours as needed OUTSIDE OF EATING  If  - 250, may take 2 units of Novolog  If  - 300, may take 4 units of Novolog  If  - 350, may take 6 units of Novolog  If  - 400, may take 8 units of Novolog  If +, may take 10 units of  Novolog    Blakeney McKnight, PA-C [facility]

## 2023-07-18 ENCOUNTER — OFFICE VISIT (OUTPATIENT)
Dept: DIABETES | Facility: CLINIC | Age: 43
End: 2023-07-18
Payer: COMMERCIAL

## 2023-07-18 DIAGNOSIS — Z79.4 UNCONTROLLED TYPE 2 DIABETES MELLITUS WITH HYPERGLYCEMIA, WITH LONG-TERM CURRENT USE OF INSULIN: Primary | ICD-10-CM

## 2023-07-18 DIAGNOSIS — E11.69 HYPERLIPIDEMIA ASSOCIATED WITH TYPE 2 DIABETES MELLITUS: ICD-10-CM

## 2023-07-18 DIAGNOSIS — E11.65 UNCONTROLLED TYPE 2 DIABETES MELLITUS WITH HYPERGLYCEMIA, WITH LONG-TERM CURRENT USE OF INSULIN: Primary | ICD-10-CM

## 2023-07-18 DIAGNOSIS — E78.5 HYPERLIPIDEMIA ASSOCIATED WITH TYPE 2 DIABETES MELLITUS: ICD-10-CM

## 2023-07-18 DIAGNOSIS — I10 HYPERTENSION GOAL BP (BLOOD PRESSURE) < 130/80: ICD-10-CM

## 2023-07-18 DIAGNOSIS — E66.01 MORBID OBESITY: ICD-10-CM

## 2023-07-18 PROCEDURE — 3060F PR POS MICROALBUMINURIA RESULT DOCUMENTED/REVIEW: ICD-10-PCS | Mod: CPTII,93,, | Performed by: PHYSICIAN ASSISTANT

## 2023-07-18 PROCEDURE — 3066F PR DOCUMENTATION OF TREATMENT FOR NEPHROPATHY: ICD-10-PCS | Mod: CPTII,93,, | Performed by: PHYSICIAN ASSISTANT

## 2023-07-18 PROCEDURE — 3066F NEPHROPATHY DOC TX: CPT | Mod: CPTII,93,, | Performed by: PHYSICIAN ASSISTANT

## 2023-07-18 PROCEDURE — 99214 PR OFFICE/OUTPT VISIT, EST, LEVL IV, 30-39 MIN: ICD-10-PCS | Mod: 93,,, | Performed by: PHYSICIAN ASSISTANT

## 2023-07-18 PROCEDURE — 3060F POS MICROALBUMINURIA REV: CPT | Mod: CPTII,93,, | Performed by: PHYSICIAN ASSISTANT

## 2023-07-18 PROCEDURE — 99214 OFFICE O/P EST MOD 30 MIN: CPT | Mod: 93,,, | Performed by: PHYSICIAN ASSISTANT

## 2023-07-18 PROCEDURE — 3046F HEMOGLOBIN A1C LEVEL >9.0%: CPT | Mod: CPTII,93,, | Performed by: PHYSICIAN ASSISTANT

## 2023-07-18 PROCEDURE — 95251 PR GLUCOSE MONITOR, 72 HOUR, PHYS INTERP: ICD-10-PCS | Mod: NDTC,S$GLB,, | Performed by: PHYSICIAN ASSISTANT

## 2023-07-18 PROCEDURE — 3046F PR MOST RECENT HEMOGLOBIN A1C LEVEL > 9.0%: ICD-10-PCS | Mod: CPTII,93,, | Performed by: PHYSICIAN ASSISTANT

## 2023-07-18 PROCEDURE — 4010F ACE/ARB THERAPY RXD/TAKEN: CPT | Mod: CPTII,93,, | Performed by: PHYSICIAN ASSISTANT

## 2023-07-18 PROCEDURE — 4010F PR ACE/ARB THEARPY RXD/TAKEN: ICD-10-PCS | Mod: CPTII,93,, | Performed by: PHYSICIAN ASSISTANT

## 2023-07-18 PROCEDURE — 95251 CONT GLUC MNTR ANALYSIS I&R: CPT | Mod: NDTC,S$GLB,, | Performed by: PHYSICIAN ASSISTANT

## 2023-07-18 NOTE — PROGRESS NOTES
PCP: Zari Lim DO    Subjective:     Chief Complaint: Diabetes - Established Patient    Established Patient - Audio Only Telehealth Visit     The patient location is: Home  The chief complaint leading to consultation is: Diabetes follow up  Visit type: Virtual visit with audio only (telephone)  Total Time Spent with Patient: 7 minutes     The reason for the audio only service rather than synchronous audio and video virtual visit was related to technical difficulties or patient preference/necessity.     Each patient to whom I provide medical services by telemedicine is:  (1) informed of the relationship between the physician and patient and the respective role of any other health care provider with respect to management of the patient; and (2) notified that they may decline to receive medical services by telemedicine and may withdraw from such care at any time. Patient verbally consented to receive this service via voice-only telephone call.    This service was not originating from a related E/M service provided within the previous 7 days nor will  to an E/M service or procedure within the next 24 hours or my soonest available appointment.  Prevailing standard of care was able to be met in this audio-only visit.         HISTORY OF PRESENT ILLNESS: 42 y.o.   female presenting for diabetes management visit.   The patient's last visit with me was on 7/14/2023.  Patient has had Type II diabetes since 2010.  Pertinent to decision making is the following comorbidities: HTN, HLD and Obesity by BMI  Patient has the following Diabetes complications: with diabetic polyneuropathy  She  has attended diabetes education in the past.     Patient's most recent A1c of 10.8% was completed 2 months ago.   Patient states since Her last A1c Her blood glucose levels have been high  throughout the day .   Patient monitors blood glucose 4 times per day and Continuously with personal CGM Dexcom.   Patient blood  glucose monitoring device will be uploaded into [employer] [facility] today.     Patient report shows euglycemia throughout the day since insulin change.   Patient endorses the following diabetes related symptoms:  None .   Patient is due today for the following diabetes-related health maintenance standards: COVID-19 Vaccine .   She denies recent hospital admissions or emergency room visits.   She denies having hypoglycemia.   Patient's concerns today include glycemic control. Of note, patient had gastric sleeve on July 6, 2024 - currently on liquid diet for 3 more days then progression to soft.   Patient medication regimen is as below.     CURRENT DM MEDICATIONS:    Levemir 15 units twice a day  Novolog correction dosing every 3 hours as below  Actos 15 mg     Novolog Correction Dosing every 3 hours as needed OUTSIDE OF EATING  If  - 250, may take 2 units of Novolog  If  - 300, may take 4 units of Novolog  If  - 350, may take 6 units of Novolog  If  - 400, may take 8 units of Novolog  If +, may take 10 units of Novolog    Patient has failed the following Diabetes medications:   Metformin   Trulicity / Ozempic - stopped after bariatric surgery      Labs Reviewed.       Lab Results   Component Value Date    CPEPTIDE 2.2 12/29/2016     No results found for: GLUTAMICACID       //   , There is no height or weight on file to calculate BMI.  Her blood sugar in clinic today is:    Lab Results   Component Value Date    POCGLU 77 10/14/2019       Review of Systems   Constitutional:  Negative for activity change, appetite change, chills and fever.   HENT:  Negative for dental problem, mouth sores, nosebleeds, sore throat and trouble swallowing.    Eyes:  Negative for pain and discharge.   Respiratory:  Negative for shortness of breath, wheezing and stridor.    Cardiovascular:  Negative for chest pain, palpitations and leg swelling.   Gastrointestinal:  Negative for abdominal pain, diarrhea, nausea and  vomiting.   Endocrine: Negative for polydipsia, polyphagia and polyuria.   Genitourinary:  Negative for dysuria, frequency and urgency.   Musculoskeletal:  Negative for joint swelling and myalgias.   Skin:  Negative for rash and wound.   Neurological:  Negative for dizziness, syncope, weakness and headaches.   Psychiatric/Behavioral:  Negative for behavioral problems and dysphoric mood.        Diabetes Management Status  Statin: Taking  ACE/ARB: Taking    Screening or Prevention Patient's value Goal Complete/Controlled?   HgA1C Testing and Control   Lab Results   Component Value Date    HGBA1C 10.8 (H) 2023      Annually/Less than 8% No   Lipid profile : 2023 Annually Yes   LDL control Lab Results   Component Value Date    LDLCALC 117.0 2023    Annually/Less than 100 mg/dl  No   Nephropathy screening Lab Results   Component Value Date    MICALBCREAT 49.2 (H) 2023     Lab Results   Component Value Date    PROTEINUA Negative 2021    Annually Yes   Blood pressure BP Readings from Last 1 Encounters:   23 132/84    Less than 140/90 Yes   Dilated retinal exam : 2023 Annually Yes    Foot exam   : 2023 Annually Yes     Social History     Socioeconomic History    Marital status: Single    Number of children: 0   Occupational History     Employer: [employer]   Tobacco Use    Smoking status: Former     Years: 20.00     Types: Cigarettes     Quit date:      Years since quittin.5    Smokeless tobacco: Never   Substance and Sexual Activity    Alcohol use: Not Currently     Comment: socially    Drug use: No    Sexual activity: Yes     Partners: Male     Birth control/protection: Condom   Social History Narrative    Single. Lives with mom. Has no children. 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      Past Medical History:   Diagnosis Date    Allergy     Anemia     Anxiety     Depression     Diabetes mellitus type II 2008    Gastroparesis     Hyperlipidemia      Hypertension     Morbid obesity     Neuromuscular disorder     Osteoarthritis of spine with radiculopathy, lumbar region 06/01/2021       Objective:        Physical Exam  Neurological:      Mental Status: She is alert and oriented to person, place, and time. Mental status is at baseline.   Psychiatric:         Mood and Affect: Mood normal.         Behavior: Behavior normal.         Thought Content: Thought content normal.         Judgment: Judgment normal.       Physical Exam limited secondary to Telemedicine visit    Assessment / Plan:     Uncontrolled type 2 diabetes mellitus with hyperglycemia, with long-term current use of insulin    Hypertension goal BP (blood pressure) < 130/80    Hyperlipidemia associated with type 2 diabetes mellitus    Morbid obesity          Additional Plan Details:    - POCT Glucose  - Encouraged continuation of lifestyle changes including regular exercise and limiting carbohydrates to 30-45 grams per meal threes times daily and 15 grams per snack with a limit of two daily.   - Encouraged continued monitoring of blood glucose with maintenance of 4 times daily and Continuously with personal CGM Dexcom.   - Current DM Medication Regimen: Continue Levemir 15 units BID. Continue Novolog correction dosing every 3 hours as needed. Continue Actos 15 mg daily.   - Health Maintenance standards addressed today: COVID - 19 Vaccine - patient will schedule outside of [facility]   - Nursing Visit: Patient is age 79 or younger with an A1c of 7.5 or greater and  will defer NV until after A1c .   - Follow up in 7 days with A1c prior.     CURRENT DM MEDICATIONS:    Levemir 15 units twice daily  Novolog correction dosing every 3 hours as needed  Actos 15 mg     Novolog Correction Dosing every 3 hours as needed OUTSIDE OF EATING  If  - 250, may take 2 units of Novolog  If  - 300, may take 4 units of Novolog  If  - 350, may take 6 units of Novolog  If  - 400, may take 8 units of  Novolog  If +, may take 10 units of Novolog    Blakeney McKnight, PA-C [facility]

## 2023-07-26 ENCOUNTER — PATIENT MESSAGE (OUTPATIENT)
Dept: DIABETES | Facility: CLINIC | Age: 43
End: 2023-07-26

## 2023-07-26 ENCOUNTER — OFFICE VISIT (OUTPATIENT)
Dept: DIABETES | Facility: CLINIC | Age: 43
End: 2023-07-26
Payer: COMMERCIAL

## 2023-07-26 DIAGNOSIS — E11.69 HYPERLIPIDEMIA ASSOCIATED WITH TYPE 2 DIABETES MELLITUS: ICD-10-CM

## 2023-07-26 DIAGNOSIS — E78.5 HYPERLIPIDEMIA ASSOCIATED WITH TYPE 2 DIABETES MELLITUS: ICD-10-CM

## 2023-07-26 DIAGNOSIS — Z79.4 UNCONTROLLED TYPE 2 DIABETES MELLITUS WITH HYPERGLYCEMIA, WITH LONG-TERM CURRENT USE OF INSULIN: Primary | ICD-10-CM

## 2023-07-26 DIAGNOSIS — I10 HYPERTENSION GOAL BP (BLOOD PRESSURE) < 130/80: ICD-10-CM

## 2023-07-26 DIAGNOSIS — E11.65 UNCONTROLLED TYPE 2 DIABETES MELLITUS WITH HYPERGLYCEMIA, WITH LONG-TERM CURRENT USE OF INSULIN: Primary | ICD-10-CM

## 2023-07-26 DIAGNOSIS — E66.01 MORBID OBESITY: ICD-10-CM

## 2023-07-26 PROCEDURE — 99499 NO LOS: ICD-10-PCS | Mod: 93,,, | Performed by: PHYSICIAN ASSISTANT

## 2023-07-26 PROCEDURE — 99499 UNLISTED E&M SERVICE: CPT | Mod: 93,,, | Performed by: PHYSICIAN ASSISTANT

## 2023-07-26 NOTE — PROGRESS NOTES
Unable to reach pt for telephone visit. LVM to return call. VIOLETTA Posada PA-C  Diabetes Management

## 2023-09-06 ENCOUNTER — PATIENT OUTREACH (OUTPATIENT)
Dept: ADMINISTRATIVE | Facility: HOSPITAL | Age: 43
End: 2023-09-06
Payer: COMMERCIAL

## 2023-09-08 ENCOUNTER — TELEPHONE (OUTPATIENT)
Dept: INTERNAL MEDICINE | Facility: CLINIC | Age: 43
End: 2023-09-08

## 2023-09-08 NOTE — TELEPHONE ENCOUNTER
----- Message from Amy Tang MA sent at 9/8/2023 11:05 AM CDT -----  The patient calling to inform staff she had to leave her appointment and need to reschedule. Please give her a call back at [phone]

## 2023-09-12 ENCOUNTER — PATIENT OUTREACH (OUTPATIENT)
Dept: ADMINISTRATIVE | Facility: HOSPITAL | Age: 43
End: 2023-09-12
Payer: COMMERCIAL

## 2023-09-15 ENCOUNTER — LAB VISIT (OUTPATIENT)
Dept: LAB | Facility: HOSPITAL | Age: 43
End: 2023-09-15
Attending: PHYSICIAN ASSISTANT
Payer: COMMERCIAL

## 2023-09-15 DIAGNOSIS — Z79.4 UNCONTROLLED TYPE 2 DIABETES MELLITUS WITH HYPERGLYCEMIA, WITH LONG-TERM CURRENT USE OF INSULIN: ICD-10-CM

## 2023-09-15 DIAGNOSIS — E11.65 UNCONTROLLED TYPE 2 DIABETES MELLITUS WITH HYPERGLYCEMIA, WITH LONG-TERM CURRENT USE OF INSULIN: ICD-10-CM

## 2023-09-15 DIAGNOSIS — I10 HYPERTENSION GOAL BP (BLOOD PRESSURE) < 130/80: ICD-10-CM

## 2023-09-15 DIAGNOSIS — E11.69 HYPERLIPIDEMIA ASSOCIATED WITH TYPE 2 DIABETES MELLITUS: Chronic | ICD-10-CM

## 2023-09-15 DIAGNOSIS — E78.5 HYPERLIPIDEMIA ASSOCIATED WITH TYPE 2 DIABETES MELLITUS: Chronic | ICD-10-CM

## 2023-09-15 LAB
ALBUMIN SERPL BCP-MCNC: 2.9 G/DL (ref 3.5–5.2)
ALP SERPL-CCNC: 98 U/L (ref 55–135)
ALT SERPL W/O P-5'-P-CCNC: 12 U/L (ref 10–44)
ANION GAP SERPL CALC-SCNC: 16 MMOL/L (ref 8–16)
AST SERPL-CCNC: 15 U/L (ref 10–40)
BASOPHILS # BLD AUTO: 0.03 K/UL (ref 0–0.2)
BASOPHILS NFR BLD: 0.6 % (ref 0–1.9)
BILIRUB SERPL-MCNC: 0.3 MG/DL (ref 0.1–1)
BUN SERPL-MCNC: 4 MG/DL (ref 6–20)
CALCIUM SERPL-MCNC: 8.8 MG/DL (ref 8.7–10.5)
CHLORIDE SERPL-SCNC: 105 MMOL/L (ref 95–110)
CHOLEST SERPL-MCNC: 174 MG/DL (ref 120–199)
CHOLEST/HDLC SERPL: 6.4 {RATIO} (ref 2–5)
CO2 SERPL-SCNC: 19 MMOL/L (ref 23–29)
CREAT SERPL-MCNC: 0.8 MG/DL (ref 0.5–1.4)
DIFFERENTIAL METHOD: ABNORMAL
EOSINOPHIL # BLD AUTO: 0.1 K/UL (ref 0–0.5)
EOSINOPHIL NFR BLD: 2.4 % (ref 0–8)
ERYTHROCYTE [DISTWIDTH] IN BLOOD BY AUTOMATED COUNT: 14.1 % (ref 11.5–14.5)
EST. GFR  (NO RACE VARIABLE): >60 ML/MIN/1.73 M^2
ESTIMATED AVG GLUCOSE: 223 MG/DL (ref 68–131)
GLUCOSE SERPL-MCNC: 257 MG/DL (ref 70–110)
HBA1C MFR BLD: 9.4 % (ref 4–5.6)
HCT VFR BLD AUTO: 36.9 % (ref 37–48.5)
HDLC SERPL-MCNC: 27 MG/DL (ref 40–75)
HDLC SERPL: 15.5 % (ref 20–50)
HGB BLD-MCNC: 12.2 G/DL (ref 12–16)
IMM GRANULOCYTES # BLD AUTO: 0.01 K/UL (ref 0–0.04)
IMM GRANULOCYTES NFR BLD AUTO: 0.2 % (ref 0–0.5)
LDLC SERPL CALC-MCNC: 124.8 MG/DL (ref 63–159)
LYMPHOCYTES # BLD AUTO: 1.9 K/UL (ref 1–4.8)
LYMPHOCYTES NFR BLD: 37.7 % (ref 18–48)
MCH RBC QN AUTO: 29 PG (ref 27–31)
MCHC RBC AUTO-ENTMCNC: 33.1 G/DL (ref 32–36)
MCV RBC AUTO: 88 FL (ref 82–98)
MONOCYTES # BLD AUTO: 0.4 K/UL (ref 0.3–1)
MONOCYTES NFR BLD: 8.1 % (ref 4–15)
NEUTROPHILS # BLD AUTO: 2.6 K/UL (ref 1.8–7.7)
NEUTROPHILS NFR BLD: 51 % (ref 38–73)
NONHDLC SERPL-MCNC: 147 MG/DL
NRBC BLD-RTO: 0 /100 WBC
PLATELET # BLD AUTO: 290 K/UL (ref 150–450)
PMV BLD AUTO: 11.8 FL (ref 9.2–12.9)
POTASSIUM SERPL-SCNC: 3.3 MMOL/L (ref 3.5–5.1)
PROT SERPL-MCNC: 7.6 G/DL (ref 6–8.4)
RBC # BLD AUTO: 4.2 M/UL (ref 4–5.4)
SODIUM SERPL-SCNC: 140 MMOL/L (ref 136–145)
TRIGL SERPL-MCNC: 111 MG/DL (ref 30–150)
WBC # BLD AUTO: 5.06 K/UL (ref 3.9–12.7)

## 2023-09-15 PROCEDURE — 85025 COMPLETE CBC W/AUTO DIFF WBC: CPT | Performed by: PHYSICIAN ASSISTANT

## 2023-09-15 PROCEDURE — 83036 HEMOGLOBIN GLYCOSYLATED A1C: CPT | Performed by: PHYSICIAN ASSISTANT

## 2023-09-15 PROCEDURE — 80061 LIPID PANEL: CPT | Performed by: PHYSICIAN ASSISTANT

## 2023-09-15 PROCEDURE — 36415 COLL VENOUS BLD VENIPUNCTURE: CPT | Performed by: PHYSICIAN ASSISTANT

## 2023-09-15 PROCEDURE — 80053 COMPREHEN METABOLIC PANEL: CPT | Performed by: PHYSICIAN ASSISTANT

## 2024-03-07 ENCOUNTER — PATIENT OUTREACH (OUTPATIENT)
Dept: ADMINISTRATIVE | Facility: HOSPITAL | Age: 44
End: 2024-03-07
Payer: COMMERCIAL

## 2024-03-07 NOTE — PROGRESS NOTES
Care Coordination Encounter Details:       MyChart Portal Status:         []  Reviewed MyChart Portal Status offered / enrolled if applicable        Additional Notes:     MyChart Outcomes: Pt is enrolled & active          Updates Requested / Reviewed:        Care Everywhere, , Care Team Updated, and Immunizations Reconciliation Completed or Queried: [place]         Health Maintenance Screening(s) Due:      Health Maintenance Topics Overdue:      VBHM Score: 1     Hemoglobin A1c                       Health Maintenance Topic(s) Outreach Outcomes & Actions Taken:    Lab(s) - Outreach Outcomes & Actions Taken  : lvm to schedule appt           Additional Notes:             Chronic Disease Management:     Diabetes Measures        Lab Results   Component Value Date    HGBA1C 9.4 (H) 09/15/2023           [x]  Reviewed chart for active Diabetes diagnosis     []  Scheduled necessary follow up appointments if needed         Additional Notes:  Attempted to contact the patient to schedule lab appointment, no answer, left voicemail.           Hypertension Measures        BP Readings from Last 1 Encounters:   06/09/23 132/84           [x]  Reviewed chart for active Hypertension diagnosis     []  Reviewed & documented Home BP Cuff     []  Documented a Remote BP if needed & applicable     [x]  Scheduled necessary follow up appointments with Primary Care if needed         Additional Notes:  Primary care appt is scheduled 3/21/24           Provider Team Continuity:     Last PCP Visit Date: 2/15/2023          [x]  Reviewed Primary Care Provider Visits, Annual Wellness Visit, and Future          Appointments to ensure appointments have been scheduled and/or           completed        Additional Notes:  Next Primary care appt is 3/21/24, no AWV           Social Determinants of Health          []  Reviewed, completed, and/or updated the following sections:                  Food Insecurity, Transportation Needs, Financial  Resource Strain,                 Tobacco Use        Additional Notes:             Care Management, Digital Medicine, and/or Education Referrals    OPCM Risk Score: 26.3         Next Steps - Referral Actions: no referral placed        Additional Notes:  Patient followed by Diabetes Management and is active on the [facility] Diabetes program

## 2024-03-21 ENCOUNTER — OFFICE VISIT (OUTPATIENT)
Dept: INTERNAL MEDICINE | Facility: CLINIC | Age: 44
End: 2024-03-21
Payer: COMMERCIAL

## 2024-03-21 ENCOUNTER — TELEPHONE (OUTPATIENT)
Dept: PAIN MEDICINE | Facility: CLINIC | Age: 44
End: 2024-03-21
Payer: COMMERCIAL

## 2024-03-21 VITALS
HEART RATE: 90 BPM | RESPIRATION RATE: 18 BRPM | SYSTOLIC BLOOD PRESSURE: 132 MMHG | WEIGHT: 188.5 LBS | DIASTOLIC BLOOD PRESSURE: 90 MMHG | BODY MASS INDEX: 34.48 KG/M2 | TEMPERATURE: 97 F | OXYGEN SATURATION: 99 %

## 2024-03-21 DIAGNOSIS — E11.69 HYPERLIPIDEMIA ASSOCIATED WITH TYPE 2 DIABETES MELLITUS: Primary | Chronic | ICD-10-CM

## 2024-03-21 DIAGNOSIS — E11.65 UNCONTROLLED TYPE 2 DIABETES MELLITUS WITH HYPERGLYCEMIA, WITH LONG-TERM CURRENT USE OF INSULIN: ICD-10-CM

## 2024-03-21 DIAGNOSIS — D64.9 NORMOCYTIC ANEMIA: ICD-10-CM

## 2024-03-21 DIAGNOSIS — I10 HYPERTENSION GOAL BP (BLOOD PRESSURE) < 130/80: ICD-10-CM

## 2024-03-21 DIAGNOSIS — Z79.4 UNCONTROLLED TYPE 2 DIABETES MELLITUS WITH HYPERGLYCEMIA, WITH LONG-TERM CURRENT USE OF INSULIN: ICD-10-CM

## 2024-03-21 DIAGNOSIS — E78.5 HYPERLIPIDEMIA ASSOCIATED WITH TYPE 2 DIABETES MELLITUS: Primary | Chronic | ICD-10-CM

## 2024-03-21 DIAGNOSIS — M54.42 ACUTE LEFT-SIDED LOW BACK PAIN WITH LEFT-SIDED SCIATICA: ICD-10-CM

## 2024-03-21 PROCEDURE — 99999 PR PBB SHADOW E&M-EST. PATIENT-LVL V: CPT | Mod: PBBFAC,,, | Performed by: PHYSICIAN ASSISTANT

## 2024-03-21 PROCEDURE — 3080F DIAST BP >= 90 MM HG: CPT | Mod: CPTII,S$GLB,, | Performed by: PHYSICIAN ASSISTANT

## 2024-03-21 PROCEDURE — 3008F BODY MASS INDEX DOCD: CPT | Mod: CPTII,S$GLB,, | Performed by: PHYSICIAN ASSISTANT

## 2024-03-21 PROCEDURE — 1160F RVW MEDS BY RX/DR IN RCRD: CPT | Mod: CPTII,S$GLB,, | Performed by: PHYSICIAN ASSISTANT

## 2024-03-21 PROCEDURE — 99214 OFFICE O/P EST MOD 30 MIN: CPT | Mod: S$GLB,,, | Performed by: PHYSICIAN ASSISTANT

## 2024-03-21 PROCEDURE — 1159F MED LIST DOCD IN RCRD: CPT | Mod: CPTII,S$GLB,, | Performed by: PHYSICIAN ASSISTANT

## 2024-03-21 PROCEDURE — 3075F SYST BP GE 130 - 139MM HG: CPT | Mod: CPTII,S$GLB,, | Performed by: PHYSICIAN ASSISTANT

## 2024-03-21 RX ORDER — PIOGLITAZONEHYDROCHLORIDE 15 MG/1
15 TABLET ORAL DAILY
Qty: 90 TABLET | Refills: 0 | Status: SHIPPED | OUTPATIENT
Start: 2024-03-21

## 2024-03-21 RX ORDER — CYCLOBENZAPRINE HCL 10 MG
10 TABLET ORAL NIGHTLY PRN
Qty: 10 TABLET | Refills: 0 | Status: SHIPPED | OUTPATIENT
Start: 2024-03-21

## 2024-03-21 RX ORDER — INSULIN DETEMIR 100 [IU]/ML
15 INJECTION, SOLUTION SUBCUTANEOUS 2 TIMES DAILY
Qty: 27 ML | Refills: 0 | Status: SHIPPED | OUTPATIENT
Start: 2024-03-21

## 2024-03-21 RX ORDER — LOSARTAN POTASSIUM 50 MG/1
50 TABLET ORAL DAILY
Qty: 90 TABLET | Refills: 1 | Status: SHIPPED | OUTPATIENT
Start: 2024-03-21 | End: 2025-03-21

## 2024-03-21 RX ORDER — INSULIN ASPART 100 [IU]/ML
INJECTION, SOLUTION INTRAVENOUS; SUBCUTANEOUS
Qty: 27 ML | Refills: 0 | Status: SHIPPED | OUTPATIENT
Start: 2024-03-21

## 2024-03-21 RX ORDER — DICLOFENAC SODIUM 10 MG/G
2 GEL TOPICAL 4 TIMES DAILY
Qty: 100 G | Refills: 0 | Status: SHIPPED | OUTPATIENT
Start: 2024-03-21 | End: 2024-03-31

## 2024-03-21 NOTE — Clinical Note
Good morning,  Patient previously saw Piyush and I'm not sure when she will be back from maternity leave.  She is overdue for follow up, can you please assist in scheduling her?

## 2024-03-21 NOTE — PROGRESS NOTES
Subjective:      Patient ID: Maikol Pacheco is a 43 y.o. female.    Chief Complaint: Follow-up (She is here for a follow up. She is having pain in her left leg, pain starts in her lower back/hip area and goes down to right above her knee. She states she is also having pain in both of her feet. )    Patient is known to me, being seen today for follow up.     HTN- losartan 50mg  BP has been high, out of medication x1mth  HLD- on statin therapy   DM- A1c 9.4%, levemir, novolog, followed by Diabetes, due for f/u  Out of ozempic since July 2023, would like to consider restarting mounjaro   Out of actos x1mth   Occasionally with low sugar readings so her doses of insulin varies (70 to 300s), reports taking both levemir and novolog 10-20 units 3x daily   Currently w dexcom     Bariatric July 2023     Due for labs     Reports low back pain w L sided sciatica for several months   Reports tingling in feet bilaterally   Evaluated in ER on 2/29, Rx: Norco and robaxin     Last visit June 2023 with myself.       Review of Systems   Constitutional:  Negative for chills, diaphoresis and fever.   HENT:  Negative for congestion, rhinorrhea and sore throat.    Respiratory:  Negative for cough, shortness of breath and wheezing.    Gastrointestinal:  Negative for abdominal pain, constipation, diarrhea, nausea and vomiting.   Musculoskeletal:  Positive for back pain.   Skin:  Negative for rash.   Neurological:  Positive for numbness. Negative for dizziness, light-headedness and headaches.       Objective:   BP (!) 132/90 (BP Location: Left arm, Patient Position: Sitting, BP Method: Medium (Manual))   Pulse 90   Temp 97.2 °F (36.2 °C) (Tympanic)   Resp 18   Wt 85.5 kg (188 lb 7.9 oz)   SpO2 99%   BMI 34.48 kg/m²   Physical Exam  Constitutional:       General: She is not in acute distress.     Appearance: She is well-developed. She is not ill-appearing or diaphoretic.   HENT:      Head: Normocephalic and atraumatic.       Right Ear: External ear normal.      Left Ear: External ear normal.   Eyes:      General: Lids are normal.         Right eye: No discharge.         Left eye: No discharge.      Conjunctiva/sclera: Conjunctivae normal.      Right eye: Right conjunctiva is not injected.      Left eye: Left conjunctiva is not injected.   Pulmonary:      Effort: Pulmonary effort is normal. No respiratory distress.   Musculoskeletal:      Cervical back: Bony tenderness present.   Skin:     General: Skin is warm and dry.      Findings: No rash.   Neurological:      Mental Status: She is alert and oriented to person, place, and time.      Sensory: No sensory deficit.      Motor: Motor function is intact.   Psychiatric:         Speech: Speech normal.         Behavior: Behavior normal.         Thought Content: Thought content normal.         Judgment: Judgment normal.       Assessment:      1. Hyperlipidemia associated with type 2 diabetes mellitus    2. Uncontrolled type 2 diabetes mellitus with hyperglycemia, with long-term current use of insulin    3. Normocytic anemia    4. Hypertension goal BP (blood pressure) < 130/80    5. Acute left-sided low back pain with left-sided sciatica       Plan:   Hyperlipidemia associated with type 2 diabetes mellitus  -     Lipid Panel; Future; Expected date: 03/21/2024    Uncontrolled type 2 diabetes mellitus with hyperglycemia, with long-term current use of insulin  -     Hemoglobin A1C; Future; Expected date: 03/21/2024  -     insulin detemir U-100, Levemir, (LEVEMIR FLEXTOUCH U100 INSULIN) 100 unit/mL (3 mL) InPn pen; Inject 15 Units into the skin 2 (two) times daily.  Dispense: 27 mL; Refill: 0  -     insulin aspart U-100 (NOVOLOG FLEXPEN U-100 INSULIN) 100 unit/mL (3 mL) InPn pen; Take up to 20 units of novolog at meal times based on sliding scale  Dispense: 27 mL; Refill: 0  -     pioglitazone (ACTOS) 15 MG tablet; Take 1 tablet (15 mg total) by mouth once daily.  Dispense: 90 tablet; Refill:  0    Normocytic anemia  -     CBC Auto Differential; Future; Expected date: 03/21/2024    Hypertension goal BP (blood pressure) < 130/80  -     Comprehensive Metabolic Panel; Future; Expected date: 03/21/2024  -     losartan (COZAAR) 50 MG tablet; Take 1 tablet (50 mg total) by mouth once daily.  Dispense: 90 tablet; Refill: 1    Acute left-sided low back pain with left-sided sciatica  -     X-Ray Lumbar Spine AP And Lateral; Future; Expected date: 03/21/2024  -     Ambulatory referral/consult to [facility]; Future; Expected date: 03/28/2024  -     cyclobenzaprine (FLEXERIL) 10 MG tablet; Take 1 tablet (10 mg total) by mouth nightly as needed for Muscle spasms.  Dispense: 10 tablet; Refill: 0  -     diclofenac sodium (VOLTAREN) 1 % Gel; Apply 2 g topically 4 (four) times daily. for 10 days  Dispense: 100 g; Refill: 0    There is confusion (on both patient and my part) in regards to current insulin dosing   Will dose as discussed with patient below and make adjustments as needed   Levemir 15 units bid    Novolog 10units three time daily w meals SS  If blood sugar is below 70 eat first then check your blood sugar 2 hours later and make correction.  If blood pre-meal sugar is 70 -150 take 10 units of Novolog;  If blood pre-meal sugar is 151-200 take +2 units of Novolog;  If blood pre-meal sugar is 201-250 take +4 units of Novolog;  If blood pre-meal sugar is 251-300 take +6 units of Novolog;  If blood pre-meal sugar is 301-350 take +8 units of Novolog;  If blood pre-meal sugar is 351-400+ take +10 units of Novolog;   Restart actos     Restart losartan   1mth f/u for BP and diabetes     Will message diabetes for follow up     Eye exam to be scheduled     Fasting labs     Caution with muscle relaxer as may cause drowsines     3mth f/u PCP

## 2024-03-21 NOTE — PATIENT INSTRUCTIONS
Levemir 15 units twice daily     Novolog 10units three time daily w meals  If blood sugar is below 70 eat first then check your blood sugar 2 hours later and make correction.  If blood pre-meal sugar is 70 -150 take 10 units of Novolog;  If blood pre-meal sugar is 151-200 take +2 units of Novolog;  If blood pre-meal sugar is 201-250 take +4 units of Novolog;  If blood pre-meal sugar is 251-300 take +6 units of Novolog;  If blood pre-meal sugar is 301-350 take +8 units of Novolog;  If blood pre-meal sugar is 351-400+ take +10 units of Novolog;

## 2024-04-02 ENCOUNTER — TELEPHONE (OUTPATIENT)
Dept: INTERNAL MEDICINE | Facility: CLINIC | Age: 44
End: 2024-04-02
Payer: COMMERCIAL

## 2024-04-02 NOTE — TELEPHONE ENCOUNTER
----- Message from Sandi Rodney sent at 4/2/2024 12:47 PM CDT -----  Contact: Nader [phone]  Type:  Pharmacy Calling to Clarify an RX    Name of Caller:  Nader   Pharmacy Name:    Prescription Name:  insulin detemir U-100, Levemir, (LEVEMIR FLEXTOUCH U100 INSULIN) 100 unit/mL (3 mL) InPn pen   What do they need to clarify?:  Discontinued   Best Call Back Number:  [phone]  Additional Information:    [id number] - [place], [place] - [address] AT [facility] [address]  [address]  [place] [address]  Phone: [phone] Fax: [phone]

## 2024-04-03 ENCOUNTER — PATIENT MESSAGE (OUTPATIENT)
Dept: PULMONOLOGY | Facility: CLINIC | Age: 44
End: 2024-04-03
Payer: COMMERCIAL

## 2024-04-04 ENCOUNTER — HOSPITAL ENCOUNTER (OUTPATIENT)
Dept: RADIOLOGY | Facility: HOSPITAL | Age: 44
Discharge: HOME | End: 2024-04-04
Attending: PHYSICIAN ASSISTANT
Payer: COMMERCIAL

## 2024-04-04 DIAGNOSIS — M54.42 ACUTE LEFT-SIDED LOW BACK PAIN WITH LEFT-SIDED SCIATICA: ICD-10-CM

## 2024-04-04 PROCEDURE — 72100 X-RAY EXAM L-S SPINE 2/3 VWS: CPT | Mod: 26,,, | Performed by: RADIOLOGY

## 2024-04-04 PROCEDURE — 72100 X-RAY EXAM L-S SPINE 2/3 VWS: CPT | Mod: TC

## 2024-04-25 ENCOUNTER — TELEPHONE (OUTPATIENT)
Dept: DIABETES | Facility: CLINIC | Age: 44
End: 2024-04-25
Payer: COMMERCIAL

## 2024-04-25 NOTE — TELEPHONE ENCOUNTER
Attempted to return patient call in regards to be appointment. Patient didn't answer, left message to call office back.  ----- Message from Ayla Wilks RD, CDE sent at 4/25/2024  9:28 AM CDT -----  Contact: [phone]    ----- Message -----  From: Katty Gaming  Sent: 4/25/2024   9:25 AM CDT  To: #    Pt had to cx her virtual appt with Esther Nguyễn this morning and would like a call to discuss rs. Can you please call her back? Thanks

## 2024-05-21 ENCOUNTER — PATIENT MESSAGE (OUTPATIENT)
Dept: ADMINISTRATIVE | Facility: HOSPITAL | Age: 44
End: 2024-05-21
Payer: COMMERCIAL

## 2024-07-09 ENCOUNTER — PATIENT MESSAGE (OUTPATIENT)
Dept: DIABETES | Facility: CLINIC | Age: 44
End: 2024-07-09
Payer: COMMERCIAL

## 2024-10-01 ENCOUNTER — PATIENT OUTREACH (OUTPATIENT)
Dept: ADMINISTRATIVE | Facility: HOSPITAL | Age: 44
End: 2024-10-01
Payer: COMMERCIAL

## 2024-10-01 NOTE — PROGRESS NOTES
VBC activation completed.    LM offering to schedule mammo and needed labs, preferably before appt with  BELKYS Faith, 10/28/24.

## 2024-10-28 ENCOUNTER — HOSPITAL ENCOUNTER (OUTPATIENT)
Dept: RADIOLOGY | Facility: HOSPITAL | Age: 44
Discharge: HOME | End: 2024-10-28
Attending: INTERNAL MEDICINE
Payer: COMMERCIAL

## 2024-10-28 VITALS — HEIGHT: 62 IN | WEIGHT: 187.38 LBS | BODY MASS INDEX: 34.48 KG/M2

## 2024-10-28 DIAGNOSIS — Z12.31 ENCOUNTER FOR SCREENING MAMMOGRAM FOR BREAST CANCER: ICD-10-CM

## 2024-10-28 PROCEDURE — 76642 ULTRASOUND BREAST LIMITED: CPT | Mod: 26,LT,, | Performed by: RADIOLOGY

## 2024-10-28 PROCEDURE — 77062 BREAST TOMOSYNTHESIS BI: CPT | Mod: 26,,, | Performed by: RADIOLOGY

## 2024-10-28 PROCEDURE — 77066 DX MAMMO INCL CAD BI: CPT | Mod: TC

## 2024-10-28 PROCEDURE — 77066 DX MAMMO INCL CAD BI: CPT | Mod: 26,,, | Performed by: RADIOLOGY

## 2024-10-28 PROCEDURE — 76642 ULTRASOUND BREAST LIMITED: CPT | Mod: TC,LT

## 2025-01-02 ENCOUNTER — PATIENT OUTREACH (OUTPATIENT)
Dept: ADMINISTRATIVE | Facility: HOSPITAL | Age: 45
End: 2025-01-02
Payer: COMMERCIAL

## 2025-01-02 DIAGNOSIS — Z79.4 UNCONTROLLED TYPE 2 DIABETES MELLITUS WITH HYPERGLYCEMIA, WITH LONG-TERM CURRENT USE OF INSULIN: Primary | ICD-10-CM

## 2025-01-02 DIAGNOSIS — E11.65 UNCONTROLLED TYPE 2 DIABETES MELLITUS WITH HYPERGLYCEMIA, WITH LONG-TERM CURRENT USE OF INSULIN: Primary | ICD-10-CM

## 2025-01-02 NOTE — PROGRESS NOTES
VBHM Score: 5     Colon Cancer Screening  Urine Screening  Hemoglobin A1c  Foot Exam  Uncontrolled BP                 Pt last seen March 2024. Has eye appt scheduled, 1/06/25 and appt with PCP on 1/14/25.  LM requesting pt come in fasting for same day labs. Micro  Albumin urine ordered and scheduled same day.

## 2025-01-08 ENCOUNTER — PATIENT OUTREACH (OUTPATIENT)
Dept: ADMINISTRATIVE | Facility: HOSPITAL | Age: 45
End: 2025-01-08
Payer: COMMERCIAL

## 2025-01-08 DIAGNOSIS — E78.5 HYPERLIPIDEMIA ASSOCIATED WITH TYPE 2 DIABETES MELLITUS: ICD-10-CM

## 2025-01-08 DIAGNOSIS — E11.69 HYPERLIPIDEMIA ASSOCIATED WITH TYPE 2 DIABETES MELLITUS: ICD-10-CM

## 2025-01-14 ENCOUNTER — LAB VISIT (OUTPATIENT)
Dept: LAB | Facility: HOSPITAL | Age: 45
End: 2025-01-14
Attending: INTERNAL MEDICINE
Payer: COMMERCIAL

## 2025-01-14 ENCOUNTER — OFFICE VISIT (OUTPATIENT)
Dept: INTERNAL MEDICINE | Facility: CLINIC | Age: 45
End: 2025-01-14
Payer: COMMERCIAL

## 2025-01-14 VITALS
RESPIRATION RATE: 20 BRPM | SYSTOLIC BLOOD PRESSURE: 142 MMHG | OXYGEN SATURATION: 98 % | HEIGHT: 62 IN | HEART RATE: 90 BPM | WEIGHT: 182.56 LBS | BODY MASS INDEX: 33.6 KG/M2 | DIASTOLIC BLOOD PRESSURE: 90 MMHG | TEMPERATURE: 97 F

## 2025-01-14 DIAGNOSIS — E78.5 HYPERLIPIDEMIA LDL GOAL <70: ICD-10-CM

## 2025-01-14 DIAGNOSIS — I10 HYPERTENSION GOAL BP (BLOOD PRESSURE) < 130/80: Primary | Chronic | ICD-10-CM

## 2025-01-14 DIAGNOSIS — E11.69 HYPERLIPIDEMIA ASSOCIATED WITH TYPE 2 DIABETES MELLITUS: ICD-10-CM

## 2025-01-14 DIAGNOSIS — Z98.84 S/P BARIATRIC SURGERY: ICD-10-CM

## 2025-01-14 DIAGNOSIS — Z23 NEED FOR VACCINATION: ICD-10-CM

## 2025-01-14 DIAGNOSIS — Z79.4 UNCONTROLLED TYPE 2 DIABETES MELLITUS WITH HYPERGLYCEMIA, WITH LONG-TERM CURRENT USE OF INSULIN: ICD-10-CM

## 2025-01-14 DIAGNOSIS — E11.65 UNCONTROLLED TYPE 2 DIABETES MELLITUS WITH HYPERGLYCEMIA, WITH LONG-TERM CURRENT USE OF INSULIN: ICD-10-CM

## 2025-01-14 DIAGNOSIS — E66.811 OBESITY (BMI 30.0-34.9): ICD-10-CM

## 2025-01-14 DIAGNOSIS — Z12.11 COLON CANCER SCREENING: ICD-10-CM

## 2025-01-14 DIAGNOSIS — E78.5 HYPERLIPIDEMIA ASSOCIATED WITH TYPE 2 DIABETES MELLITUS: ICD-10-CM

## 2025-01-14 PROBLEM — E66.01 MORBID OBESITY WITH BMI OF 45.0-49.9, ADULT: Status: RESOLVED | Noted: 2017-03-17 | Resolved: 2025-01-14

## 2025-01-14 LAB
ESTIMATED AVG GLUCOSE: 272 MG/DL (ref 68–131)
HBA1C MFR BLD: 11.1 % (ref 4–5.6)

## 2025-01-14 PROCEDURE — 3008F BODY MASS INDEX DOCD: CPT | Mod: CPTII,S$GLB,, | Performed by: INTERNAL MEDICINE

## 2025-01-14 PROCEDURE — 3080F DIAST BP >= 90 MM HG: CPT | Mod: CPTII,S$GLB,, | Performed by: INTERNAL MEDICINE

## 2025-01-14 PROCEDURE — 83036 HEMOGLOBIN GLYCOSYLATED A1C: CPT | Performed by: INTERNAL MEDICINE

## 2025-01-14 PROCEDURE — 3077F SYST BP >= 140 MM HG: CPT | Mod: CPTII,S$GLB,, | Performed by: INTERNAL MEDICINE

## 2025-01-14 PROCEDURE — G0008 ADMIN INFLUENZA VIRUS VAC: HCPCS | Mod: S$GLB,,, | Performed by: INTERNAL MEDICINE

## 2025-01-14 PROCEDURE — 1159F MED LIST DOCD IN RCRD: CPT | Mod: CPTII,S$GLB,, | Performed by: INTERNAL MEDICINE

## 2025-01-14 PROCEDURE — 99999 PR PBB SHADOW E&M-EST. PATIENT-LVL IV: CPT | Mod: PBBFAC,,, | Performed by: INTERNAL MEDICINE

## 2025-01-14 PROCEDURE — 90656 IIV3 VACC NO PRSV 0.5 ML IM: CPT | Mod: S$GLB,,, | Performed by: INTERNAL MEDICINE

## 2025-01-14 PROCEDURE — 1160F RVW MEDS BY RX/DR IN RCRD: CPT | Mod: CPTII,S$GLB,, | Performed by: INTERNAL MEDICINE

## 2025-01-14 PROCEDURE — 36415 COLL VENOUS BLD VENIPUNCTURE: CPT | Performed by: INTERNAL MEDICINE

## 2025-01-14 PROCEDURE — 99214 OFFICE O/P EST MOD 30 MIN: CPT | Mod: 25,S$GLB,, | Performed by: INTERNAL MEDICINE

## 2025-01-14 RX ORDER — SIMVASTATIN 40 MG/1
40 TABLET, FILM COATED ORAL NIGHTLY
Qty: 90 TABLET | Refills: 1 | Status: SHIPPED | OUTPATIENT
Start: 2025-01-14

## 2025-01-14 RX ORDER — LOSARTAN POTASSIUM 50 MG/1
50 TABLET ORAL DAILY
Qty: 90 TABLET | Refills: 1 | Status: SHIPPED | OUTPATIENT
Start: 2025-01-14

## 2025-01-14 RX ORDER — PIOGLITAZONEHYDROCHLORIDE 15 MG/1
15 TABLET ORAL DAILY
Qty: 90 TABLET | Refills: 1 | Status: SHIPPED | OUTPATIENT
Start: 2025-01-14

## 2025-01-14 RX ORDER — INSULIN DETEMIR 100 [IU]/ML
15 INJECTION, SOLUTION SUBCUTANEOUS 2 TIMES DAILY
Qty: 27 ML | Refills: 1 | Status: SHIPPED | OUTPATIENT
Start: 2025-01-14

## 2025-01-14 RX ORDER — BLOOD-GLUCOSE SENSOR
EACH MISCELLANEOUS
Qty: 5 EACH | Refills: 3 | Status: SHIPPED | OUTPATIENT
Start: 2025-01-14 | End: 2025-01-30 | Stop reason: SDUPTHER

## 2025-01-14 RX ORDER — INSULIN ASPART 100 [IU]/ML
INJECTION, SOLUTION INTRAVENOUS; SUBCUTANEOUS
Qty: 27 ML | Refills: 1 | Status: SHIPPED | OUTPATIENT
Start: 2025-01-14

## 2025-01-14 NOTE — PROGRESS NOTES
Maikol Pacheco  44 y.o. Black or  female  [id number]    Chief Complaint:  Chief Complaint   Patient presents with    Follow-up     3 months       History of Present Illness:  History of Present Illness    CHIEF COMPLAINT:  Ms. Pacheco presents today for medication refills and follow-up.    MEDICATIONS:  She has been out of all medications for 2-3 weeks, including blood pressure medications, Levemir (15 units twice daily), Novolog, and other diabetic medications.    DIABETES MANAGEMENT:  She is not checking blood sugar regularly. She uses a Dexcom continuous glucose monitor but has run out of sensors. She missed her diabetes specialist appointment due to work commitments and needs to reschedule.    GI CONCERNS:  She reports irregular bowel movements. Her bariatric doctor has recommended increasing fiber intake. She denies blood in stool or black bowel movements.    PREVENTIVE CARE:  She is due for colon cancer screening with no prior history of colonoscopy or other screening methods. She has not received this year's flu vaccine as she recently recovered from a cold. She has an upcoming eye exam scheduled at [facility] at the end of the week.         Review of Systems   Constitutional:  Negative for fever.   Respiratory:  Negative for shortness of breath.    Cardiovascular:  Negative for chest pain and leg swelling.   Gastrointestinal:  Positive for constipation.       Laboratory:  Lab Results   Component Value Date    WBC 5.21 04/04/2024    HGB 12.7 04/04/2024    HCT 38.6 04/04/2024     04/04/2024    CHOL 169 04/04/2024    TRIG 68 04/04/2024    HDL 44 04/04/2024    LDLDIRECT 106.16 12/08/2022    ALT 10 04/04/2024    AST 10 04/04/2024     04/04/2024    K 4.3 04/04/2024     04/04/2024    CREATININE 0.8 04/04/2024    BUN 9 04/04/2024    CO2 24 04/04/2024    TSH 0.842 04/16/2021    HGBA1C 10.2 (H) 04/04/2024     Lab Results   Component Value Date    LDLCALC 111.4 04/04/2024  "      History:  Past Medical History:   Diagnosis Date    Allergy     Anemia     Anxiety     Depression     Diabetes mellitus type II 2008    Gastroparesis     Hyperlipidemia     Hypertension     Morbid obesity     Neuromuscular disorder     Osteoarthritis of spine with radiculopathy, lumbar region 06/01/2021       Current Outpatient Medications:     blood sugar diagnostic Strp, 1 strip by Misc.(Non-Drug; Combo Route) route 2 (two) times daily. Test strips covered by insurance, Disp: 100 strip, Rfl: 1    blood-glucose meter kit, Blood glucose Meter kit covered by insurance. Use as instructed, Disp: 1 each, Rfl: 0    cyclobenzaprine (FLEXERIL) 10 MG tablet, Take 1 tablet (10 mg total) by mouth nightly as needed for Muscle spasms., Disp: 10 tablet, Rfl: 0    diclofenac sodium (VOLTAREN) 1 % Gel, Apply 2 g topically 4 (four) times daily. for 10 days, Disp: 100 g, Rfl: 0    ibuprofen (ADVIL,MOTRIN) 600 MG tablet, Take 600 mg by mouth every 6 (six) hours as needed., Disp: , Rfl:     lancets 33 gauge Misc, 1 lancet by Misc.(Non-Drug; Combo Route) route 2 (two) times daily. Covered by insurance, Disp: 100 each, Rfl: 1    pen needle, diabetic (BD ULTRA-FINE MINI PEN NEEDLE) 31 gauge x 3/16" Ndle, Use five times daily with insulin injections., Disp: 150 each, Rfl: 11    tranexamic acid (LYSTEDA ORAL), Lysteda     insulin aspart U-100 (NOVOLOG FLEXPEN U-100 INSULIN) 100 unit/mL (3 mL) InPn pen, Take up to 20 units of novolog at meal times based on sliding scale, Disp: 27 mL, Rfl: 1    insulin detemir U-100, Levemir, (LEVEMIR FLEXTOUCH U100 INSULIN) 100 unit/mL (3 mL) InPn pen, Inject 15 Units into the skin 2 (two) times daily., Disp: 27 mL, Rfl: 1    losartan (COZAAR) 50 MG tablet, Take 1 tablet (50 mg total) by mouth once daily., Disp: 90 tablet, Rfl: 1    pioglitazone (ACTOS) 15 MG tablet, Take 1 tablet (15 mg total) by mouth once daily., Disp: 90 tablet, Rfl: 1    simvastatin (ZOCOR) 40 MG tablet, Take 1 tablet (40 mg " total) by mouth every evening., Disp: 90 tablet, Rfl: 1        Allergies:  Review of patient's allergies indicates:   Allergen Reactions    Clindamycin Hives, Itching and Rash     Other reaction(s): Not Indicated       Exam:  Vitals:    01/14/25 0937   BP: (!) 142/90   Pulse: 90   Resp: 20   Temp: 96.5 °F (35.8 °C)     Weight: 82.8 kg (182 lb 8.7 oz)   Body mass index is 33.39 kg/m².      Physical Exam    Vitals: Reviewed.  Constitutional: No acute distress. Well-developed. Not ill-appearing.  Eyes: No scleral icterus.  Cardiovascular: Normal rate and regular rhythm. Normal heart sounds.  Pulmonary: Pulmonary effort is normal. No respiratory distress. Normal breath sounds.  Abdomen: Soft. Nontender. Nondistended. Normoactive bowel sounds.  Extremities: No edema.  Skin: Warm. Dry.  Neurological: Alert and oriented to person, place, and time.  Psychiatric: Behavior normal.     Physical Exam  Cardiovascular:      Pulses:           Dorsalis pedis pulses are 2+ on the right side and 2+ on the left side.   Feet:      Right foot:      Protective Sensation: 5 sites tested.  5 sites sensed.      Skin integrity: No ulcer.      Left foot:      Protective Sensation: 5 sites tested.  5 sites sensed.      Skin integrity: No ulcer.          Assessment:  The primary encounter diagnosis was Hypertension goal BP (blood pressure) < 130/80. Diagnoses of Uncontrolled type 2 diabetes mellitus with hyperglycemia, with long-term current use of insulin, Hyperlipidemia LDL goal <70, Obesity (BMI 30.0-34.9), S/P bariatric surgery, and Colon cancer screening were also pertinent to this visit.    Assessment & Plan      HYPERTENSION:  - Evaluated blood pressure, noting slight elevation likely due to medication non-adherence.  - Prescribed Losartan for blood pressure management.  - Scheduled follow up in 4 weeks for blood pressure recheck with nurse.    TYPE 2 DIABETES MELLITUS WITH OTHER SPECIFIED COMPLICATION:  - Reviewed diabetes management,  including use of Dexcom continuous glucose monitor.  - Assessed for diabetic neuropathy symptoms, inquiring about numbness or tingling in feet.  - Noted patient has been out of all medications, including insulin, for 2-3 weeks.  - Prescribed insulin regimen: Levemir 15 units twice daily and Novolog.  - Restarted other diabetic medications: empagliflozin (Jardiance), dulaglutide (Trulicity), metformin, and simvastatin.  - Provided prescription for Dexcom continuous glucose monitor and sensors.  - Ordered comprehensive metabolic panel and cholesterol panel.  - Instructed patient to schedule appointment with diabetes specialist and reminded about the importance of obtaining diabetic eye exam at scheduled [facility] appointment.  - Emphasized the importance of regular glucose monitoring and medication adherence.    HYPERLIPIDEMIA:   - Check lipid panel.    MEDICATION ADHERENCE:  - Educated on importance of not running out of medications and requesting refills before supply is depleted.  - Instructed patient to contact office for medication refills in the future, even if it has been a while since last visit.  - Prescribed 90-day supply of medications to improve adherence.    OBESITY:  - Noted recent bariatric surgery on July 6th or 23rd and its impact on weight management.  - Advised patient to increase fiber intake for bowel regularity as recommended by bariatric doctor.    COLON CANCER SCREENING:  - Recommend colon cancer screening options given patient's age of 44.  - Explained colonoscopy procedure and anesthesia requirements as an option for screening.

## 2025-01-24 LAB
LEFT EYE DM RETINOPATHY: NEGATIVE
RIGHT EYE DM RETINOPATHY: NEGATIVE

## 2025-01-30 DIAGNOSIS — Z79.4 UNCONTROLLED TYPE 2 DIABETES MELLITUS WITH HYPERGLYCEMIA, WITH LONG-TERM CURRENT USE OF INSULIN: ICD-10-CM

## 2025-01-30 DIAGNOSIS — E11.65 UNCONTROLLED TYPE 2 DIABETES MELLITUS WITH HYPERGLYCEMIA, WITH LONG-TERM CURRENT USE OF INSULIN: ICD-10-CM

## 2025-01-30 NOTE — TELEPHONE ENCOUNTER
Refill Routing Note   Medication(s) are not appropriate for processing by [facility] for the following reason(s):        New or recently adjusted medication    [facility] action(s):  Defer               Appointments  past 12m or future 3m with PCP    Date Provider   Last Visit   1/14/2025 Zari Lim DO   Next Visit   Visit date not found Zari Lim DO   ED visits in past 90 days: 0        Note composed:9:50 AM 01/30/2025

## 2025-01-30 NOTE — TELEPHONE ENCOUNTER
No care due was identified.  [facility] Embedded Care Due Messages. Reference number: [id number].   1/30/2025 1:38:44 AM CST

## 2025-02-01 RX ORDER — BLOOD-GLUCOSE SENSOR
EACH MISCELLANEOUS
Qty: 5 EACH | Refills: 3 | Status: SHIPPED | OUTPATIENT
Start: 2025-02-01 | End: 2025-02-04 | Stop reason: SDUPTHER

## 2025-02-04 ENCOUNTER — OFFICE VISIT (OUTPATIENT)
Dept: DIABETES | Facility: CLINIC | Age: 45
End: 2025-02-04
Payer: COMMERCIAL

## 2025-02-04 DIAGNOSIS — E78.5 HYPERLIPIDEMIA ASSOCIATED WITH TYPE 2 DIABETES MELLITUS: ICD-10-CM

## 2025-02-04 DIAGNOSIS — I10 HYPERTENSION GOAL BP (BLOOD PRESSURE) < 130/80: ICD-10-CM

## 2025-02-04 DIAGNOSIS — E66.9 OBESITY (BMI 30-39.9): ICD-10-CM

## 2025-02-04 DIAGNOSIS — E11.69 HYPERLIPIDEMIA ASSOCIATED WITH TYPE 2 DIABETES MELLITUS: ICD-10-CM

## 2025-02-04 DIAGNOSIS — E11.65 UNCONTROLLED TYPE 2 DIABETES MELLITUS WITH HYPERGLYCEMIA, WITH LONG-TERM CURRENT USE OF INSULIN: Primary | ICD-10-CM

## 2025-02-04 DIAGNOSIS — Z79.4 UNCONTROLLED TYPE 2 DIABETES MELLITUS WITH HYPERGLYCEMIA, WITH LONG-TERM CURRENT USE OF INSULIN: Primary | ICD-10-CM

## 2025-02-04 PROCEDURE — 3066F NEPHROPATHY DOC TX: CPT | Mod: CPTII,95,, | Performed by: PHYSICIAN ASSISTANT

## 2025-02-04 PROCEDURE — 3046F HEMOGLOBIN A1C LEVEL >9.0%: CPT | Mod: CPTII,95,, | Performed by: PHYSICIAN ASSISTANT

## 2025-02-04 PROCEDURE — G2211 COMPLEX E/M VISIT ADD ON: HCPCS | Mod: 95,,, | Performed by: PHYSICIAN ASSISTANT

## 2025-02-04 PROCEDURE — 4010F ACE/ARB THERAPY RXD/TAKEN: CPT | Mod: CPTII,95,, | Performed by: PHYSICIAN ASSISTANT

## 2025-02-04 PROCEDURE — 3060F POS MICROALBUMINURIA REV: CPT | Mod: CPTII,95,, | Performed by: PHYSICIAN ASSISTANT

## 2025-02-04 PROCEDURE — 98005 SYNCH AUDIO-VIDEO EST LOW 20: CPT | Mod: 95,,, | Performed by: PHYSICIAN ASSISTANT

## 2025-02-04 RX ORDER — PEN NEEDLE, DIABETIC 30 GX3/16"
NEEDLE, DISPOSABLE MISCELLANEOUS
Qty: 150 EACH | Refills: 11 | Status: SHIPPED | OUTPATIENT
Start: 2025-02-04

## 2025-02-04 RX ORDER — BLOOD-GLUCOSE SENSOR
1 EACH MISCELLANEOUS
Qty: 9 EACH | Refills: 3 | Status: SHIPPED | OUTPATIENT
Start: 2025-02-04

## 2025-02-04 RX ORDER — INSULIN ASPART 100 [IU]/ML
INJECTION, SOLUTION INTRAVENOUS; SUBCUTANEOUS
Qty: 45 ML | Refills: 3 | Status: SHIPPED | OUTPATIENT
Start: 2025-02-04

## 2025-02-04 RX ORDER — INSULIN DEGLUDEC 200 U/ML
30 INJECTION, SOLUTION SUBCUTANEOUS DAILY
Qty: 13.5 ML | Refills: 3 | Status: SHIPPED | OUTPATIENT
Start: 2025-02-04 | End: 2026-02-04

## 2025-02-04 RX ORDER — PIOGLITAZONEHYDROCHLORIDE 15 MG/1
15 TABLET ORAL DAILY
Qty: 90 TABLET | Refills: 1 | Status: SHIPPED | OUTPATIENT
Start: 2025-02-04

## 2025-02-04 NOTE — PROGRESS NOTES
PCP: Zari Lim DO    Subjective:     Chief Complaint: Diabetes - Established Patient    TELEMEDICINE VISIT:     The patient location is: Home  The chief complaint leading to consultation is: Diabetes Follow up  Visit type: Virtual visit with synchronous audio and video  Each patient to whom he or she provides medical services by telemedicine is:  (1) informed of the relationship between the physician and patient and the respective role of any other health care provider with respect to management of the patient; and (2) notified that he or she may decline to receive medical services by telemedicine and may withdraw from such care at any time.    Notes: N/A     HISTORY OF PRESENT ILLNESS: 44 y.o.   female presenting for diabetes management visit.   The patient's last visit with me was on 7/14/2023.  Patient has had Type II diabetes since 2010.  Pertinent to decision making is the following comorbidities: HTN, HLD and Obesity by BMI  Patient has the following Diabetes complications: with diabetic polyneuropathy  She  has attended diabetes education in the past.     Patient's most recent A1c of 11.1% was completed 2 weeks ago.   Patient states since Her last A1c Her blood glucose levels have been high  throughout the day .   Patient monitors blood glucose 4 times per day and Continuously with personal CGM Dexcom. Dexcom G7 pending.   Patient blood glucose monitoring device will be uploaded into [employer] today.   Patient reports Bgs in 200s.being off insulin.   Patient endorses the following diabetes related symptoms:  None .   Patient is due today for the following diabetes-related health maintenance standards: Eye Exam and colorectal cancer screening .   She denies recent hospital admissions or emergency room visits.   She denies having hypoglycemia.   Patient's concerns today include glycemic control. Of note, patient had gastric sleeve on July 6, 2023.  Patient medication regimen is as  "below.     CURRENT DM MEDICATIONS:    Levemir 15 units twice a day - OFF X several weeks   Novolog correction dosing every 3 hours as below  Actos 15 mg     Novolog Correction Dosing every 3 hours as needed OUTSIDE OF EATING  If  - 250, may take 2 units of Novolog  If  - 300, may take 4 units of Novolog  If  - 350, may take 6 units of Novolog  If  - 400, may take 8 units of Novolog  If +, may take 10 units of Novolog    Patient has failed the following Diabetes medications:   Metformin   Trulicity / Ozempic - stopped after bariatric surgery      Labs Reviewed.       Lab Results   Component Value Date    CPEPTIDE 2.2 12/29/2016     No results found for: "GLUTAMICACID"       //   , There is no height or weight on file to calculate BMI.  Her blood sugar in clinic today is:    Lab Results   Component Value Date    POCGLU 77 10/14/2019       Review of Systems   Constitutional:  Negative for activity change, appetite change, chills and fever.   HENT:  Negative for dental problem, mouth sores, nosebleeds, sore throat and trouble swallowing.    Eyes:  Negative for pain and discharge.   Respiratory:  Negative for shortness of breath, wheezing and stridor.    Cardiovascular:  Negative for chest pain, palpitations and leg swelling.   Gastrointestinal:  Negative for abdominal pain, diarrhea, nausea and vomiting.   Endocrine: Negative for polydipsia, polyphagia and polyuria.   Genitourinary:  Negative for dysuria, frequency and urgency.   Musculoskeletal:  Negative for joint swelling and myalgias.   Skin:  Negative for rash and wound.   Neurological:  Negative for dizziness, syncope, weakness and headaches.   Psychiatric/Behavioral:  Negative for behavioral problems and dysphoric mood.          Diabetes Management Status  Statin: Taking  ACE/ARB: Taking    Screening or Prevention Patient's value Goal Complete/Controlled?   HgA1C Testing and Control   Lab Results   Component Value Date    HGBA1C 11.1 " (H) 2025      Annually/Less than 8% No   Lipid profile : 2025 Annually Yes   LDL control Lab Results   Component Value Date    LDLCALC 110.0 2025    Annually/Less than 100 mg/dl  No   Nephropathy screening Lab Results   Component Value Date    MICALBCREAT 43.6 (H) 2025     Lab Results   Component Value Date    PROTEINUA Negative 2021    Annually Yes   Blood pressure BP Readings from Last 1 Encounters:   25 (!) 142/90    Less than 140/90 Yes   Dilated retinal exam : 2023 Annually Yes    Foot exam   : 2025 Annually Yes     Social History     Socioeconomic History    Marital status: Single    Number of children: 0   Occupational History     Employer: [employer]   Tobacco Use    Smoking status: Former     Current packs/day: 0.00     Types: Cigarettes     Start date:      Quit date:      Years since quittin.1    Smokeless tobacco: Never   Substance and Sexual Activity    Alcohol use: Not Currently     Comment: socially    Drug use: No    Sexual activity: Yes     Partners: Male     Birth control/protection: Condom   Social History Narrative    Single. Lives with mom. Has no children. 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      Past Medical History:   Diagnosis Date    Allergy     Anemia     Anxiety     Depression     Diabetes mellitus type II     Gastroparesis     Hyperlipidemia     Hypertension     Morbid obesity     Neuromuscular disorder     Osteoarthritis of spine with radiculopathy, lumbar region 2021       Objective:        Physical Exam  Constitutional:       General: She is not in acute distress.     Appearance: She is well-developed. She is not diaphoretic.   HENT:      Head: Normocephalic and atraumatic.      Right Ear: External ear normal.      Left Ear: External ear normal.      Nose: Nose normal.   Eyes:      General:         Right eye: No discharge.         Left eye: No discharge.   Pulmonary:      Effort: Pulmonary effort is normal.  "No respiratory distress.      Breath sounds: No stridor.   Musculoskeletal:         General: Normal range of motion.      Cervical back: Normal range of motion.   Skin:     Coloration: Skin is not pale.   Neurological:      Mental Status: She is alert and oriented to person, place, and time. Mental status is at baseline.      Motor: No abnormal muscle tone.      Coordination: Coordination normal.   Psychiatric:         Mood and Affect: Mood normal.         Behavior: Behavior normal.         Thought Content: Thought content normal.         Judgment: Judgment normal.             Assessment / Plan:     Uncontrolled type 2 diabetes mellitus with hyperglycemia, with long-term current use of insulin  -     insulin degludec (TRESIBA FLEXTOUCH U-200) 200 unit/mL (3 mL) insulin pen; Inject 30 Units into the skin once daily.  Dispense: 13.5 mL; Refill: 3  -     insulin aspart U-100 (NOVOLOG FLEXPEN U-100 INSULIN) 100 unit/mL (3 mL) InPn pen; Take up to 50 units of novolog at meal times based on sliding scale  Dispense: 45 mL; Refill: 3  -     blood-glucose sensor (DEXCOM G7 SENSOR) Shyanne; 1 each by Misc.(Non-Drug; Combo Route) route every 10 days. Use for continuous glucose monitoring.  Dispense: 9 each; Refill: 3  -     pioglitazone (ACTOS) 15 MG tablet; Take 1 tablet (15 mg total) by mouth once daily.  Dispense: 90 tablet; Refill: 1  -     pen needle, diabetic (BD ULTRA-FINE MINI PEN NEEDLE) 31 gauge x 3/16" Ndle; Use five times daily with insulin injections.  Dispense: 150 each; Refill: 11    Hypertension goal BP (blood pressure) < 130/80    Hyperlipidemia associated with type 2 diabetes mellitus    Obesity (BMI 30-39.9)    Controlled type 2 diabetes mellitus without complication, without long-term current use of insulin          Additional Plan Details:    - POCT Glucose  - Encouraged continuation of lifestyle changes including regular exercise and limiting carbohydrates to 30-45 grams per meal threes times daily and 15 " grams per snack with a limit of two daily.   - Encouraged continued monitoring of blood glucose with maintenance of 4 times daily and Continuously with personal CGM Dexcom.   - Current DM Medication Regimen: Change Tresiba 30 units daily - replaces Levemir. Continue Novolog correction dosing every 3 hours as needed. Continue Actos 15 mg daily.   - Health Maintenance standards addressed today: Eye Exam - will be completed outside of [facility] and patient will schedule and colorectal cancer screening  - Nursing Visit: Patient is age 79 or younger with an A1c of 7.5 or greater and  will defer NV until after A1c .   - Follow up in 2 wks with A1c prior.     CURRENT DM MEDICATIONS:    Tresiba 30 units daily - replaces Levemir 15 units BID  Novolog correction dosing every 3 hours as needed  Actos 15 mg     Novolog Correction Dosing every 3 hours as needed OUTSIDE OF EATING  If  - 250, may take 2 units of Novolog  If  - 300, may take 4 units of Novolog  If  - 350, may take 6 units of Novolog  If  - 400, may take 8 units of Novolog  If +, may take 10 units of Novolog    Blakeney McKnight, PA-C [facility]

## 2025-02-04 NOTE — PATIENT INSTRUCTIONS
CURRENT DM MEDICATIONS:    Tresiba 30 units daily - replaces Levemir 15 units BID  Novolog correction dosing every 3 hours as needed  Actos 15 mg     Novolog Correction Dosing every 3 hours as needed OUTSIDE OF EATING  If  - 250, may take 2 units of Novolog  If  - 300, may take 4 units of Novolog  If  - 350, may take 6 units of Novolog  If  - 400, may take 8 units of Novolog  If +, may take 10 units of Novolog

## 2025-02-14 ENCOUNTER — TELEPHONE (OUTPATIENT)
Dept: DIABETES | Facility: CLINIC | Age: 45
End: 2025-02-14
Payer: COMMERCIAL

## 2025-02-14 NOTE — TELEPHONE ENCOUNTER
PA outcome: insulin aspart U-100 (NOVOLOG FLEXPEN U-100 INSULIN) 100 unit/mL (3 mL) InPn pen     Denied for generic: Covered by brand  Insurance preferred: Fiasp, Novolog

## 2025-02-18 ENCOUNTER — OFFICE VISIT (OUTPATIENT)
Dept: DIABETES | Facility: CLINIC | Age: 45
End: 2025-02-18
Payer: COMMERCIAL

## 2025-02-18 DIAGNOSIS — E66.9 OBESITY (BMI 30-39.9): ICD-10-CM

## 2025-02-18 DIAGNOSIS — E11.65 UNCONTROLLED TYPE 2 DIABETES MELLITUS WITH HYPERGLYCEMIA, WITH LONG-TERM CURRENT USE OF INSULIN: Primary | ICD-10-CM

## 2025-02-18 DIAGNOSIS — E78.5 HYPERLIPIDEMIA ASSOCIATED WITH TYPE 2 DIABETES MELLITUS: ICD-10-CM

## 2025-02-18 DIAGNOSIS — E11.69 HYPERLIPIDEMIA ASSOCIATED WITH TYPE 2 DIABETES MELLITUS: ICD-10-CM

## 2025-02-18 DIAGNOSIS — Z79.4 UNCONTROLLED TYPE 2 DIABETES MELLITUS WITH HYPERGLYCEMIA, WITH LONG-TERM CURRENT USE OF INSULIN: Primary | ICD-10-CM

## 2025-02-18 DIAGNOSIS — I10 HYPERTENSION GOAL BP (BLOOD PRESSURE) < 130/80: ICD-10-CM

## 2025-02-18 NOTE — PROGRESS NOTES
PCP: Zari Lim DO    Subjective:     Chief Complaint: Diabetes - Established Patient    TELEMEDICINE VISIT:     The patient location is: Home  The chief complaint leading to consultation is: Diabetes Follow up  Visit type: Virtual visit with synchronous audio and video  Each patient to whom he or she provides medical services by telemedicine is:  (1) informed of the relationship between the physician and patient and the respective role of any other health care provider with respect to management of the patient; and (2) notified that he or she may decline to receive medical services by telemedicine and may withdraw from such care at any time.    Notes: N/A     HISTORY OF PRESENT ILLNESS: 44 y.o.   female presenting for diabetes management visit.   The patient's last visit with me was on 2/4/2025.   Patient has had Type II diabetes since 2010.  Pertinent to decision making is the following comorbidities: HTN, HLD and Obesity by BMI  Patient has the following Diabetes complications: with diabetic polyneuropathy  She  has attended diabetes education in the past.     Patient's most recent A1c of 11.1% was completed 1 months ago.   Patient states since Her last A1c Her blood glucose levels have been high  throughout the day .   Patient monitors blood glucose 4 times per day and Continuously with personal CGM Dexcom.   Patient blood glucose monitoring device will be uploaded into Media Section today.        Interpretation of CGM as following: some hypoglycemia at baseline and mildly postprandially with mounjaro start at outside bariatrics practice.   Patient endorses the following diabetes related symptoms:  None .   Patient is due today for the following diabetes-related health maintenance standards: Eye Exam and colorectal cancer screening .   She denies recent hospital admissions or emergency room visits.   She denies having hypoglycemia.   Patient's concerns today include glycemic control. Of  "note, patient had gastric sleeve on July 6, 2023.  Patient medication regimen is as below.     CURRENT DM MEDICATIONS:    Tresiba 30 units daily   Novolog 15 units TID before meals - taking 30 mins before meals  Novolog correction dosing every 3 hours as needed  Actos 15 mg   Mounjaro 2.5 mg weekly - taken 2 doses / started by outside Bariatric team    Novolog Correction Dosing every 3 hours as needed OUTSIDE OF EATING  If  - 250, may take 2 units of Novolog  If  - 300, may take 4 units of Novolog  If  - 350, may take 6 units of Novolog  If  - 400, may take 8 units of Novolog  If +, may take 10 units of Novolog    Patient has failed the following Diabetes medications:   Metformin   Trulicity / Ozempic - stopped after bariatric surgery  Levemir      Labs Reviewed.       Lab Results   Component Value Date    CPEPTIDE 2.2 12/29/2016     No results found for: "GLUTAMICACID"       //   , There is no height or weight on file to calculate BMI.  Her blood sugar in clinic today is:    Lab Results   Component Value Date    POCGLU 77 10/14/2019       Review of Systems   Constitutional:  Negative for activity change, appetite change, chills and fever.   HENT:  Negative for dental problem, mouth sores, nosebleeds, sore throat and trouble swallowing.    Eyes:  Negative for pain and discharge.   Respiratory:  Negative for shortness of breath, wheezing and stridor.    Cardiovascular:  Negative for chest pain, palpitations and leg swelling.   Gastrointestinal:  Negative for abdominal pain, diarrhea, nausea and vomiting.   Endocrine: Negative for polydipsia, polyphagia and polyuria.   Genitourinary:  Negative for dysuria, frequency and urgency.   Musculoskeletal:  Negative for joint swelling and myalgias.   Skin:  Negative for rash and wound.   Neurological:  Negative for dizziness, syncope, weakness and headaches.   Psychiatric/Behavioral:  Negative for behavioral problems and dysphoric mood.      "     Diabetes Management Status  Statin: Taking  ACE/ARB: Taking    Screening or Prevention Patient's value Goal Complete/Controlled?   HgA1C Testing and Control   Lab Results   Component Value Date    HGBA1C 11.1 (H) 2025      Annually/Less than 8% No   Lipid profile : 2025 Annually Yes   LDL control Lab Results   Component Value Date    LDLCALC 110.0 2025    Annually/Less than 100 mg/dl  No   Nephropathy screening Lab Results   Component Value Date    MICALBCREAT 43.6 (H) 2025     Lab Results   Component Value Date    PROTEINUA Negative 2021    Annually Yes   Blood pressure BP Readings from Last 1 Encounters:   25 (!) 142/90    Less than 140/90 Yes   Dilated retinal exam : 2023 Annually Yes    Foot exam   : 2025 Annually Yes     Social History     Socioeconomic History    Marital status: Single    Number of children: 0   Occupational History     Employer: [employer]   Tobacco Use    Smoking status: Former     Current packs/day: 0.00     Types: Cigarettes     Start date:      Quit date:      Years since quittin.1    Smokeless tobacco: Never   Substance and Sexual Activity    Alcohol use: Not Currently     Comment: socially    Drug use: No    Sexual activity: Yes     Partners: Male     Birth control/protection: Condom   Social History Narrative    Single. Lives with mom. Has no children. 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      Past Medical History:   Diagnosis Date    Allergy     Anemia     Anxiety     Depression     Diabetes mellitus type II 2008    Gastroparesis     Hyperlipidemia     Hypertension     Morbid obesity     Neuromuscular disorder     Osteoarthritis of spine with radiculopathy, lumbar region 2021       Objective:        Physical Exam  Constitutional:       General: She is not in acute distress.     Appearance: She is well-developed. She is not diaphoretic.   HENT:      Head: Normocephalic and atraumatic.      Right Ear: External  ear normal.      Left Ear: External ear normal.      Nose: Nose normal.   Eyes:      General:         Right eye: No discharge.         Left eye: No discharge.   Pulmonary:      Effort: Pulmonary effort is normal. No respiratory distress.      Breath sounds: No stridor.   Musculoskeletal:         General: Normal range of motion.      Cervical back: Normal range of motion.   Skin:     Coloration: Skin is not pale.   Neurological:      Mental Status: She is alert and oriented to person, place, and time. Mental status is at baseline.      Motor: No abnormal muscle tone.      Coordination: Coordination normal.   Psychiatric:         Mood and Affect: Mood normal.         Behavior: Behavior normal.         Thought Content: Thought content normal.         Judgment: Judgment normal.             Assessment / Plan:     Uncontrolled type 2 diabetes mellitus with hyperglycemia, with long-term current use of insulin    Hypertension goal BP (blood pressure) < 130/80    Hyperlipidemia associated with type 2 diabetes mellitus    Obesity (BMI 30-39.9)          Additional Plan Details:    - POCT Glucose  - Encouraged continuation of lifestyle changes including regular exercise and limiting carbohydrates to 30-45 grams per meal threes times daily and 15 grams per snack with a limit of two daily.   - Encouraged continued monitoring of blood glucose with maintenance of 4 times daily and Continuously with personal CGM Dexcom.   - Current DM Medication Regimen: Change Tresiba 26 units daily. Change Novolog 12 units TID before meals and correction dosing every 3 hours as needed. Continue Actos 15 mg daily - will hold with transition to higher mounjaro dose. Change Mounjaro to 5 mg weekly.   - Health Maintenance standards addressed today: Eye Exam - will be completed outside of [facility] and patient will schedule and colorectal cancer screening  - Nursing Visit: Patient is age 79 or younger with an A1c of 7.5 or greater and  will defer NV  until after A1c .   - Follow up in 5 wks with A1c prior.     CURRENT DM MEDICATIONS:    Tresiba 26 units daily   Novolog 12 units TID before meals - taking 15 mins before meals  Novolog correction dosing every 3 hours as needed  Actos 15 mg   Mounjaro 2.5 mg weekly - finish last 2 doses    Novolog Correction Dosing every 3 hours as needed OUTSIDE OF EATING  If  - 250, may take 2 units of Novolog  If  - 300, may take 4 units of Novolog  If  - 350, may take 6 units of Novolog  If  - 400, may take 8 units of Novolog  If +, may take 10 units of Novolog      In 2 WEEKS WITH MOUNARO CHANGE :    Tresiba 20 units daily   Novolog 9 units TID before meals - taking 15 mins before meals  Novolog correction dosing every 3 hours as needed  Actos 15 mg - HOLD  Mounjaro 5 mg weekly    Novolog Correction Dosing every 3 hours as needed OUTSIDE OF EATING  If  - 250, may take 2 units of Novolog  If  - 300, may take 4 units of Novolog  If  - 350, may take 6 units of Novolog  If  - 400, may take 8 units of Novolog  If +, may take 10 units of Novolog    Blakeney McKnight, PA-C [facility]

## 2025-02-18 NOTE — PATIENT INSTRUCTIONS
CURRENT DM MEDICATIONS:    Tresiba 26 units daily   Novolog 12 units TID before meals - taking 15 mins before meals  Novolog correction dosing every 3 hours as needed  Actos 15 mg   Mounjaro 2.5 mg weekly - finish last 2 doses    Novolog Correction Dosing every 3 hours as needed OUTSIDE OF EATING  If  - 250, may take 2 units of Novolog  If  - 300, may take 4 units of Novolog  If  - 350, may take 6 units of Novolog  If  - 400, may take 8 units of Novolog  If +, may take 10 units of Novolog      In 2 WEEKS WITH MOUNARO CHANGE :    Tresiba 20 units daily   Novolog 9 units TID before meals - taking 15 mins before meals  Novolog correction dosing every 3 hours as needed  Actos 15 mg - HOLD  Mounjaro 5 mg weekly    Novolog Correction Dosing every 3 hours as needed OUTSIDE OF EATING  If  - 250, may take 2 units of Novolog  If  - 300, may take 4 units of Novolog  If  - 350, may take 6 units of Novolog  If  - 400, may take 8 units of Novolog  If +, may take 10 units of Novolog

## 2025-06-03 ENCOUNTER — OFFICE VISIT (OUTPATIENT)
Dept: DIABETES | Facility: CLINIC | Age: 45
End: 2025-06-03
Payer: COMMERCIAL

## 2025-06-03 ENCOUNTER — TELEPHONE (OUTPATIENT)
Dept: DIABETES | Facility: CLINIC | Age: 45
End: 2025-06-03

## 2025-06-03 ENCOUNTER — LAB VISIT (OUTPATIENT)
Dept: LAB | Facility: HOSPITAL | Age: 45
End: 2025-06-03
Attending: PHYSICIAN ASSISTANT
Payer: COMMERCIAL

## 2025-06-03 DIAGNOSIS — E11.65 UNCONTROLLED TYPE 2 DIABETES MELLITUS WITH HYPERGLYCEMIA, WITH LONG-TERM CURRENT USE OF INSULIN: Primary | ICD-10-CM

## 2025-06-03 DIAGNOSIS — E66.9 OBESITY (BMI 30-39.9): ICD-10-CM

## 2025-06-03 DIAGNOSIS — Z79.4 UNCONTROLLED TYPE 2 DIABETES MELLITUS WITH HYPERGLYCEMIA, WITH LONG-TERM CURRENT USE OF INSULIN: Primary | ICD-10-CM

## 2025-06-03 DIAGNOSIS — E11.69 HYPERLIPIDEMIA ASSOCIATED WITH TYPE 2 DIABETES MELLITUS: ICD-10-CM

## 2025-06-03 DIAGNOSIS — E78.5 HYPERLIPIDEMIA ASSOCIATED WITH TYPE 2 DIABETES MELLITUS: ICD-10-CM

## 2025-06-03 DIAGNOSIS — Z79.4 UNCONTROLLED TYPE 2 DIABETES MELLITUS WITH HYPERGLYCEMIA, WITH LONG-TERM CURRENT USE OF INSULIN: ICD-10-CM

## 2025-06-03 DIAGNOSIS — E11.65 UNCONTROLLED TYPE 2 DIABETES MELLITUS WITH HYPERGLYCEMIA, WITH LONG-TERM CURRENT USE OF INSULIN: ICD-10-CM

## 2025-06-03 DIAGNOSIS — I10 HYPERTENSION GOAL BP (BLOOD PRESSURE) < 130/80: ICD-10-CM

## 2025-06-03 LAB
EAG (OHS): 137 MG/DL (ref 68–131)
HBA1C MFR BLD: 6.4 % (ref 4–5.6)

## 2025-06-03 PROCEDURE — G2211 COMPLEX E/M VISIT ADD ON: HCPCS | Mod: 95,,, | Performed by: PHYSICIAN ASSISTANT

## 2025-06-03 PROCEDURE — 3046F HEMOGLOBIN A1C LEVEL >9.0%: CPT | Mod: CPTII,95,, | Performed by: PHYSICIAN ASSISTANT

## 2025-06-03 PROCEDURE — 4010F ACE/ARB THERAPY RXD/TAKEN: CPT | Mod: CPTII,95,, | Performed by: PHYSICIAN ASSISTANT

## 2025-06-03 PROCEDURE — 98006 SYNCH AUDIO-VIDEO EST MOD 30: CPT | Mod: 95,,, | Performed by: PHYSICIAN ASSISTANT

## 2025-06-03 PROCEDURE — 83036 HEMOGLOBIN GLYCOSYLATED A1C: CPT

## 2025-06-03 PROCEDURE — 36415 COLL VENOUS BLD VENIPUNCTURE: CPT

## 2025-06-03 PROCEDURE — 3060F POS MICROALBUMINURIA REV: CPT | Mod: CPTII,95,, | Performed by: PHYSICIAN ASSISTANT

## 2025-06-03 PROCEDURE — 95251 CONT GLUC MNTR ANALYSIS I&R: CPT | Mod: NDTC,,, | Performed by: PHYSICIAN ASSISTANT

## 2025-06-03 PROCEDURE — 3066F NEPHROPATHY DOC TX: CPT | Mod: CPTII,95,, | Performed by: PHYSICIAN ASSISTANT

## 2025-06-03 RX ORDER — TIRZEPATIDE 7.5 MG/.5ML
7.5 INJECTION, SOLUTION SUBCUTANEOUS
Qty: 2 ML | Refills: 11 | Status: SHIPPED | OUTPATIENT
Start: 2025-06-03

## 2025-06-04 ENCOUNTER — TELEPHONE (OUTPATIENT)
Dept: DIABETES | Facility: CLINIC | Age: 45
End: 2025-06-04
Payer: COMMERCIAL

## 2025-06-04 ENCOUNTER — RESULTS FOLLOW-UP (OUTPATIENT)
Dept: DIABETES | Facility: CLINIC | Age: 45
End: 2025-06-04

## 2025-06-12 ENCOUNTER — PATIENT OUTREACH (OUTPATIENT)
Dept: ADMINISTRATIVE | Facility: HOSPITAL | Age: 45
End: 2025-06-12
Payer: COMMERCIAL

## 2025-06-12 NOTE — PROGRESS NOTES
Received signed CUONG via fax in box.  Uploaded to media  CUONG e faxed for eye exam report. Signed Authorization attached.  Reminder set

## 2025-06-12 NOTE — LETTER
AUTHORIZATION FOR RELEASE OF   CONFIDENTIAL INFORMATION        We are seeing Maikol Pacheco, date of birth 1980, in the clinic at [facility]. Zari Lim DO is the patient's PCP. Maikol Pacheco has an outstanding lab/procedure at the time we reviewed her chart. In order to help keep her health information updated, she has authorized us to request the following medical record(s):                                             (  x)  EYE EXAM               Please fax records to [facility], Harris, Angela, DO,  at [phone] or email to [email or web address].            Patient Name: Maikol Pacheco  : 1980  Patient Phone #: [phone]

## 2025-06-24 ENCOUNTER — ON-DEMAND VIRTUAL (OUTPATIENT)
Dept: URGENT CARE | Facility: CLINIC | Age: 45
End: 2025-06-24
Payer: COMMERCIAL

## 2025-06-24 DIAGNOSIS — J32.9 SINUSITIS, UNSPECIFIED CHRONICITY, UNSPECIFIED LOCATION: Primary | ICD-10-CM

## 2025-06-24 PROCEDURE — 98004 SYNCH AUDIO-VIDEO EST SF 10: CPT | Mod: 95,,, | Performed by: NURSE PRACTITIONER

## 2025-06-24 RX ORDER — AMOXICILLIN AND CLAVULANATE POTASSIUM 875; 125 MG/1; MG/1
1 TABLET, FILM COATED ORAL EVERY 12 HOURS
Qty: 14 TABLET | Refills: 0 | Status: SHIPPED | OUTPATIENT
Start: 2025-06-24 | End: 2025-07-01

## 2025-06-24 RX ORDER — BENZONATATE 200 MG/1
200 CAPSULE ORAL 3 TIMES DAILY PRN
Qty: 15 CAPSULE | Refills: 0 | Status: SHIPPED | OUTPATIENT
Start: 2025-06-24 | End: 2025-06-29

## 2025-06-24 NOTE — PROGRESS NOTES
Subjective:      Patient ID: Maikol Pacheco is a 44 y.o. female.    Vitals:  vitals were not taken for this visit.     Chief Complaint: Headache (C/O headaches, having a cold, sneezing, coughing, congestion and sore throat x 3 weeks. Pt has taken Day ans Night Robitussin. It helped just a little with the sore throat. Pharmacy verified.)      Visit Type: TELE AUDIOVISUAL - This visit was conducted virtually based on  subjective information and limited objective exam    Present with the patient at the time of consultation: TELEMED PRESENT WITH PATIENT: None  LOCATION OF PATIENT [place]  Two patient identifiers used to verify patient- saying out date of birth and full name.       Past Medical History:   Diagnosis Date    Allergy     Anemia     Anxiety     Depression     Diabetes mellitus type II 2008    Gastroparesis     Hyperlipidemia     Hypertension     Morbid obesity     Neuromuscular disorder     Osteoarthritis of spine with radiculopathy, lumbar region 06/01/2021     Past Surgical History:   Procedure Laterality Date    ADENOIDECTOMY      CARPAL TUNNEL RELEASE Left 10/17/2019    Procedure: RELEASE, CARPAL TUNNEL;  Surgeon: Francisco Aguilar MD;  Location: [facility];  Service: Orthopedics;  Laterality: Left;    gastric balloon      placement and removal via endoscopy     TONSILLECTOMY       Review of patient's allergies indicates:   Allergen Reactions    Clindamycin Hives, Itching and Rash     Other reaction(s): Not Indicated     Medications Ordered Prior to Encounter[1]  Family History   Problem Relation Name Age of Onset    Breast cancer Mother      Hyperlipidemia Mother      Breast cancer Maternal Aunt      Cancer Maternal Aunt          breast    Diabetes Maternal Grandmother      Stroke Maternal Grandmother      Diabetes Maternal Grandfather      Hypertension Other         Medications Ordered                [id number] - [place] - [address] AT [facility] [address]   [address] [address]    Telephone: [phone]   Fax: [phone]   Hours: Not open 24 hours                         E-Prescribed (2 of 2)              amoxicillin-clavulanate 875-125mg (AUGMENTIN) 875-125 mg per tablet    Sig: Take 1 tablet by mouth every 12 (twelve) hours. for 7 days       Start: 6/24/25     Quantity: 14 tablet Refills: 0                         benzonatate (TESSALON) 200 MG capsule    Sig: Take 1 capsule (200 mg total) by mouth 3 (three) times daily as needed for Cough.       Start: 6/24/25     Quantity: 15 capsule Refills: 0                           [facility] Peq Odvv Intake    6/24/2025 12:59 PM CDT - Filed by Patient   What is your current physical address in the event of a medical emergency? N/A   Are you able to take your vital signs? No   Please attach any relevant images or files    Is your employer contracted with [facility]? No         43 yo female with cough, nasal congestion, sinus pressure and headache x 3 days. Denies fever. Reports some  "rattling" in chest. No SOB or wheezing. No known sick contact or recent travel. Taking Robitussin without relief.         Constitution: Negative for chills and fever.   HENT:  Positive for congestion, postnasal drip, sinus pressure and sore throat. Negative for ear pain and ear discharge.    Neck: neck negative.   Cardiovascular:  Negative for chest pain.   Eyes: Negative.    Respiratory:  Positive for cough. Negative for sputum production, shortness of breath, wheezing and asthma.    Gastrointestinal: Negative.    Endocrine: negative.   Genitourinary: Negative.    Musculoskeletal: Negative.    Skin: Negative.    Allergic/Immunologic: Negative for asthma.   Neurological:  Positive for headaches.        Objective:   The physical exam was conducted virtually.    AAO x 3 ; no acute distress noted; appearance normal; mood and behavior normal; thought process normal  Head- normocephalic  Nose- appears normal, no discharge or " erythema  Eyes- pupils appear normal in size, no drainage, no erythema  Ears- normal appearing; no discharge, no erythema  Mouth- appears normal  Oropharynx- no erythema, lesions  Lungs- breathing at a normal rate, no acute distress noted  Heart- no reports of tachycardia, palpitations, chest pain  Abdomen- non distended, non tender reported by patient  Skin- warm and dry, no erythema or edema noted by patient or visualized  Psych- as above; no si/hi      Assessment:     1. Sinusitis, unspecified chronicity, unspecified location        Plan:   Discussed initiating zithromax however will initiate augmentin for better coverage of sinusitis. Increase hydration,  rest. Continue Robitussin or Mucinex for congestion. Tessalon as needed for cough. Follow up with primary care.       Thank you for choosing [facility]!    Our goal in the [facility] is to always provide outstanding medical care. You may receive a survey by mail or e-mail in the next week regarding your experience today. We would greatly appreciate you completing and returning the survey. Your feedback provides us with a way to recognize our staff who provide very good care, and it helps us learn how to improve when your experience was below our aspiration of excellence.         We appreciate you trusting us with your medical care. We hope you feel better soon. We will be happy to take care of you for all of your future medical needs.    You must understand that you've received an Urgent Care treatment only and that you may be released before all your medical problems are known or treated. You, the patient, will arrange for follow up care as instructed.    Follow up with your PCP or specialty clinic as directed in the next 1-2 weeks if not improved or as needed.  You can call [phone] to schedule an appointment with the appropriate provider.    If your condition worsens we recommend that you receive another evaluation  "in person, with your primary care provider, urgent care or at the emergency room immediately or contact your primary medical clinics after hours call service to discuss your concerns.         Sinusitis, unspecified chronicity, unspecified location  -     benzonatate (TESSALON) 200 MG capsule; Take 1 capsule (200 mg total) by mouth 3 (three) times daily as needed for Cough.  Dispense: 15 capsule; Refill: 0  -     amoxicillin-clavulanate 875-125mg (AUGMENTIN) 875-125 mg per tablet; Take 1 tablet by mouth every 12 (twelve) hours. for 7 days  Dispense: 14 tablet; Refill: 0                          [1]   Current Outpatient Medications on File Prior to Visit   Medication Sig Dispense Refill    blood sugar diagnostic Strp 1 strip by Misc.(Non-Drug; Combo Route) route 2 (two) times daily. Test strips covered by insurance 100 strip 1    blood-glucose sensor (DEXCOM G7 SENSOR) Shyanne 1 each by Misc.(Non-Drug; Combo Route) route every 10 days. Use for continuous glucose monitoring. 9 each 3    cyclobenzaprine (FLEXERIL) 10 MG tablet Take 1 tablet (10 mg total) by mouth nightly as needed for Muscle spasms. 10 tablet 0    insulin aspart U-100 (NOVOLOG FLEXPEN U-100 INSULIN) 100 unit/mL (3 mL) InPn pen Take up to 50 units of novolog at meal times based on sliding scale 45 mL 3    insulin degludec (TRESIBA FLEXTOUCH U-200) 200 unit/mL (3 mL) insulin pen Inject 30 Units into the skin once daily. 13.5 mL 3    lancets 33 gauge Misc 1 lancet by Misc.(Non-Drug; Combo Route) route 2 (two) times daily. Covered by insurance 100 each 1    pen needle, diabetic (BD ULTRA-FINE MINI PEN NEEDLE) 31 gauge x 3/16" Ndle Use five times daily with insulin injections. 150 each 11    simvastatin (ZOCOR) 40 MG tablet Take 1 tablet (40 mg total) by mouth every evening. 90 tablet 1    tirzepatide (MOUNJARO) 7.5 mg/0.5 mL PnIj Inject 7.5 mg into the skin every 7 days. 2 mL 11    tranexamic acid (LYSTEDA ORAL) Lysteda Take No date recorded No form recorded " "No frequency recorded No route recorded No set duration recorded No set duration amount recorded active No dosage strength recorded No dosage strength units of measure recorded      BD ULTRA-FINE SHORT PEN NEEDLE 31 gauge x 5/16" Ndle Inject into the skin.      blood-glucose meter kit Blood glucose Meter kit covered by insurance. Use as instructed 1 each 0    diclofenac sodium (VOLTAREN) 1 % Gel Apply 2 g topically 4 (four) times daily. for 10 days 100 g 0    ibuprofen (ADVIL,MOTRIN) 600 MG tablet Take 600 mg by mouth every 6 (six) hours as needed.      losartan (COZAAR) 50 MG tablet Take 1 tablet (50 mg total) by mouth once daily. 90 tablet 1    pioglitazone (ACTOS) 15 MG tablet Take 1 tablet (15 mg total) by mouth once daily. 90 tablet 1    [DISCONTINUED] ramipriL (ALTACE) 5 MG capsule Take 1 capsule (5 mg total) by mouth once daily. 90 capsule 1     No current facility-administered medications on file prior to visit.     "

## 2025-06-26 ENCOUNTER — PATIENT OUTREACH (OUTPATIENT)
Dept: ADMINISTRATIVE | Facility: HOSPITAL | Age: 45
End: 2025-06-26
Payer: COMMERCIAL

## 2025-06-26 NOTE — LETTER
June 26, 2025        [facility]  [address]  [facility] [address]  Phone: [phone]   Patient: Maikol Pacheco   MR Number: [id number]   YOB: 1980       To whom it may concern     We are seeing Maikol Pacheco, YOB: 1980, at [facility]. Zari Lim DO is their primary care physician. To help with our health maintenance records could you please send the following:     Most recent copy of Diabetic Eye Exam that states Positive or Negative Retinopathy.    Please Fax to [phone].    Thank-you in advance for your assistance. If you have any questions or concerns, please don't hesitate to contact me at [phone].         Sincerely,  Justine DE LUNA LPN Care Coordination   [facility]  Phone [phone],  Fax [phone]

## 2025-06-26 NOTE — PROGRESS NOTES
VBHM Score: 3     Colon Cancer Screening  Eye Exam  Uncontrolled BP                 LM requesting an updated bp reading, offering to schedule follow up and asking if eye exam completed.  Per Provider note in Jan, she had eye exam scheduled for Jan at [facility].  Faxed request for copy of most recent eye exam.

## 2025-07-18 ENCOUNTER — OFFICE VISIT (OUTPATIENT)
Dept: DIABETES | Facility: CLINIC | Age: 45
End: 2025-07-18
Payer: COMMERCIAL

## 2025-07-18 DIAGNOSIS — Z12.11 SCREENING FOR COLON CANCER: ICD-10-CM

## 2025-07-18 DIAGNOSIS — Z79.4 UNCONTROLLED TYPE 2 DIABETES MELLITUS WITH HYPERGLYCEMIA, WITH LONG-TERM CURRENT USE OF INSULIN: Primary | ICD-10-CM

## 2025-07-18 DIAGNOSIS — E11.649 HYPOGLYCEMIA ASSOCIATED WITH TYPE 2 DIABETES MELLITUS: ICD-10-CM

## 2025-07-18 DIAGNOSIS — E66.9 OBESITY (BMI 30-39.9): ICD-10-CM

## 2025-07-18 DIAGNOSIS — E11.69 HYPERLIPIDEMIA ASSOCIATED WITH TYPE 2 DIABETES MELLITUS: ICD-10-CM

## 2025-07-18 DIAGNOSIS — E78.5 HYPERLIPIDEMIA ASSOCIATED WITH TYPE 2 DIABETES MELLITUS: ICD-10-CM

## 2025-07-18 DIAGNOSIS — E11.65 UNCONTROLLED TYPE 2 DIABETES MELLITUS WITH HYPERGLYCEMIA, WITH LONG-TERM CURRENT USE OF INSULIN: Primary | ICD-10-CM

## 2025-07-18 DIAGNOSIS — I10 HYPERTENSION GOAL BP (BLOOD PRESSURE) < 130/80: ICD-10-CM

## 2025-07-18 NOTE — PROGRESS NOTES
PCP: Zari Lim DO    Subjective:     Chief Complaint: Diabetes - Established Patient    TELEMEDICINE VISIT:     The patient location is: Home  The chief complaint leading to consultation is: Diabetes Follow up  Visit type: Virtual visit with synchronous audio and video  Each patient to whom he or she provides medical services by telemedicine is:  (1) informed of the relationship between the physician and patient and the respective role of any other health care provider with respect to management of the patient; and (2) notified that he or she may decline to receive medical services by telemedicine and may withdraw from such care at any time.    Notes: N/A     HISTORY OF PRESENT ILLNESS: 44 y.o.   female presenting for diabetes management visit.   The patient's last visit with me was on 6/3/2025.   Patient has had Type II diabetes since 2010.  Pertinent to decision making is the following comorbidities: HTN, HLD and Obesity by BMI  Patient has the following Diabetes complications: with diabetic polyneuropathy  She  has attended diabetes education in the past.     Patient's most recent A1c of 6.4% was completed 1 months ago.   Patient states since Her last A1c Her blood glucose levels have been high  throughout the day .   Patient monitors blood glucose 4 times per day and Continuously with personal CGM Dexcom.   Patient blood glucose monitoring device will be uploaded into [employer] today.          Interpretation of CGM as following: hypoglycemia at baseline and some appropriate cover to hypoglycemia after meal dosing. Of note, bariatric doctor has Rx 10 mg mounjaro for start in 2 wks.   Patient endorses the following diabetes related symptoms: None.   Patient is due today for the following diabetes-related health maintenance standards: Eye Exam and colorectal cancer screening.   She denies recent hospital admissions or emergency room visits.   She denies having hypoglycemia.   Patient's  "concerns today include glycemic control. Of note, patient had gastric sleeve on July 6, 2023.  Patient medication regimen is as below.     CURRENT DM MEDICATIONS:    Tresiba 16 units daily   Novolog 12 units TID before meals - taking 15 mins before meals  Novolog correction dosing every 3 hours as needed  Mounjaro 7.5 mg weekly    Novolog Correction Dosing every 3 hours as needed OUTSIDE OF EATING  If  - 250, may take 2 units of Novolog  If  - 300, may take 3 units of Novolog  If  - 350, may take 4 units of Novolog  If  - 400, may take 5 units of Novolog  If +, may take 6 units of Novolog    Patient has failed the following Diabetes medications:   Metformin   Trulicity / Ozempic - stopped after bariatric surgery  Levemir      Labs Reviewed.       Lab Results   Component Value Date    CPEPTIDE 2.2 12/29/2016     No results found for: "GLUTAMICACID"       //   , There is no height or weight on file to calculate BMI.  Her blood sugar in clinic today is:    Lab Results   Component Value Date    POCGLU 77 10/14/2019       Review of Systems   Constitutional:  Negative for activity change, appetite change, chills and fever.   HENT:  Negative for dental problem, mouth sores, nosebleeds, sore throat and trouble swallowing.    Eyes:  Negative for pain and discharge.   Respiratory:  Negative for shortness of breath, wheezing and stridor.    Cardiovascular:  Negative for chest pain, palpitations and leg swelling.   Gastrointestinal:  Negative for abdominal pain, diarrhea, nausea and vomiting.   Endocrine: Negative for polydipsia, polyphagia and polyuria.   Genitourinary:  Negative for dysuria, frequency and urgency.   Musculoskeletal:  Negative for joint swelling and myalgias.   Skin:  Negative for rash and wound.   Neurological:  Negative for dizziness, syncope, weakness and headaches.   Psychiatric/Behavioral:  Negative for behavioral problems and dysphoric mood.          Diabetes Management " Status  Statin: Taking  ACE/ARB: Taking    Screening or Prevention Patient's value Goal Complete/Controlled?   HgA1C Testing and Control   Lab Results   Component Value Date    HGBA1C 6.4 (H) 2025      Annually/Less than 8% No   Lipid profile : 2025 Annually Yes   LDL control Lab Results   Component Value Date    LDLCALC 110.0 2025    Annually/Less than 100 mg/dl  No   Nephropathy screening Lab Results   Component Value Date    MICALBCREAT 43.6 (H) 2025     Lab Results   Component Value Date    PROTEINUA Negative 2021    Annually Yes   Blood pressure BP Readings from Last 1 Encounters:   25 (!) 142/90    Less than 140/90 Yes   Dilated retinal exam : 2023 Annually Yes    Foot exam   : 2025 Annually Yes     Social History     Socioeconomic History    Marital status: Single    Number of children: 0   Occupational History     Employer: [employer]   Tobacco Use    Smoking status: Former     Current packs/day: 0.00     Types: Cigarettes     Start date:      Quit date:      Years since quittin.5    Smokeless tobacco: Never   Substance and Sexual Activity    Alcohol use: Not Currently     Comment: socially    Drug use: No    Sexual activity: Yes     Partners: Male     Birth control/protection: Condom   Social History Narrative    Single. Lives with mom. Has no children. 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      Past Medical History:   Diagnosis Date    Allergy     Anemia     Anxiety     Depression     Diabetes mellitus type II 2008    Gastroparesis     Hyperlipidemia     Hypertension     Morbid obesity     Neuromuscular disorder     Osteoarthritis of spine with radiculopathy, lumbar region 2021       Objective:        Physical Exam  Constitutional:       General: She is not in acute distress.     Appearance: She is well-developed. She is not diaphoretic.   HENT:      Head: Normocephalic and atraumatic.      Right Ear: External ear normal.      Left  Ear: External ear normal.      Nose: Nose normal.   Eyes:      General:         Right eye: No discharge.         Left eye: No discharge.   Pulmonary:      Effort: Pulmonary effort is normal. No respiratory distress.      Breath sounds: No stridor.   Musculoskeletal:         General: Normal range of motion.      Cervical back: Normal range of motion.   Skin:     Coloration: Skin is not pale.   Neurological:      Mental Status: She is alert and oriented to person, place, and time. Mental status is at baseline.      Motor: No abnormal muscle tone.      Coordination: Coordination normal.   Psychiatric:         Mood and Affect: Mood normal.         Behavior: Behavior normal.         Thought Content: Thought content normal.         Judgment: Judgment normal.           Assessment / Plan:     Uncontrolled type 2 diabetes mellitus with hyperglycemia, with long-term current use of insulin    Hypoglycemia associated with type 2 diabetes mellitus    Hypertension goal BP (blood pressure) < 130/80    Hyperlipidemia associated with type 2 diabetes mellitus    Obesity (BMI 30-39.9)    Screening for colon cancer  -     Ambulatory referral/consult to Endo Procedure ; Future; Expected date: 07/19/2025        Additional Plan Details:    - POCT Glucose  - Encouraged continuation of lifestyle changes including regular exercise and limiting carbohydrates to 30-45 grams per meal threes times daily and 15 grams per snack with a limit of two daily.   - Encouraged continued monitoring of blood glucose with maintenance of 4 times daily and Continuously with personal CGM Dexcom.   - Current DM Medication Regimen: Change Tresiba 10 units daily - then to 6u. Change Novolog 10 units TID before meals (then 6u) and correction dosing every 3 hours as needed - then to 9u.  Change Mounjaro to 10 mg weekly in 2wks.   - Health Maintenance standards addressed today: Eye Exam - will be completed outside of [facility] and patient will schedule and  colorectal cancer screening  - Nursing Visit: Patient is age 79 or younger with an A1c of 7.5 or greater and will defer NV until after A1c.   - Follow up in 4 wks with A1c prior.     CURRENT DM MEDICATIONS:    Tresiba 10 units daily   Novolog 10 units TID before meals - taking 15 mins before meals  Novolog correction dosing every 3 hours as needed  Mounjaro 7.5 mg weekly    Novolog Correction Dosing every 3 hours as needed OUTSIDE OF EATING  If  - 250, may take 2 units of Novolog  If  - 300, may take 3 units of Novolog  If  - 350, may take 4 units of Novolog  If  - 400, may take 5 units of Novolog  If +, may take 6 units of Novolog    CURRENT DM MEDICATIONS - ONCE YOU START NEW DOSE OF MOUNJARO:    Tresiba 6 units daily   Novolog 6 units TID before meals - taking 15 mins before meals  Novolog correction dosing every 3 hours as needed  Mounjaro 10 mg weekly    Novolog Correction Dosing every 3 hours as needed OUTSIDE OF EATING  If  - 250, may take 2 units of Novolog  If  - 300, may take 3 units of Novolog  If  - 350, may take 4 units of Novolog  If  - 400, may take 5 units of Novolog  If +, may take 6 units of Novolog    Blakeney McKnight, PA-C [facility]

## 2025-07-18 NOTE — PATIENT INSTRUCTIONS
CURRENT DM MEDICATIONS:    Tresiba 10 units daily   Novolog 10 units TID before meals - taking 15 mins before meals  Novolog correction dosing every 3 hours as needed  Mounjaro 7.5 mg weekly    Novolog Correction Dosing every 3 hours as needed OUTSIDE OF EATING  If  - 250, may take 2 units of Novolog  If  - 300, may take 3 units of Novolog  If  - 350, may take 4 units of Novolog  If  - 400, may take 5 units of Novolog  If +, may take 6 units of Novolog    CURRENT DM MEDICATIONS - ONCE YOU START NEW DOSE OF MOUNJARO:    Tresiba 6 units daily   Novolog 6 units TID before meals - taking 15 mins before meals  Novolog correction dosing every 3 hours as needed  Mounjaro 10 mg weekly    Novolog Correction Dosing every 3 hours as needed OUTSIDE OF EATING  If  - 250, may take 2 units of Novolog  If  - 300, may take 3 units of Novolog  If  - 350, may take 4 units of Novolog  If  - 400, may take 5 units of Novolog  If +, may take 6 units of Novolog

## 2025-07-21 ENCOUNTER — PATIENT OUTREACH (OUTPATIENT)
Dept: ADMINISTRATIVE | Facility: HOSPITAL | Age: 45
End: 2025-07-21
Payer: COMMERCIAL

## 2025-07-29 ENCOUNTER — PATIENT MESSAGE (OUTPATIENT)
Dept: ADMINISTRATIVE | Facility: HOSPITAL | Age: 45
End: 2025-07-29
Payer: COMMERCIAL

## 2025-08-29 ENCOUNTER — PATIENT MESSAGE (OUTPATIENT)
Dept: ADMINISTRATIVE | Facility: HOSPITAL | Age: 45
End: 2025-08-29
Payer: COMMERCIAL

## (undated) DEVICE — TOURNIQUET SB QC SP 24X4IN

## (undated) DEVICE — GAUZE SPONGE 4X4 12PLY

## (undated) DEVICE — SYR 10CC LUER LOCK

## (undated) DEVICE — SEE MEDLINE ITEM 157173

## (undated) DEVICE — POSITIONER HEAD DONUT 9IN FOAM

## (undated) DEVICE — SEE MEDLINE ITEM 157027

## (undated) DEVICE — GLOVE BIOGEL SZ 8 1/2

## (undated) DEVICE — SUPPORT ULNA NERVE PROTECTOR

## (undated) DEVICE — PAD CAST SPECIALIST STRL 4

## (undated) DEVICE — PAD ABD 8X10 STERILE

## (undated) DEVICE — BANDAGE DERMACEA STRETCH 4X1IN

## (undated) DEVICE — DRAPE STERI U-SHAPED 47X51IN

## (undated) DEVICE — DECANTER VIAL ASEPTIC TRANSFER

## (undated) DEVICE — UNDERGLOVES BIOGEL PI SIZE 8.5

## (undated) DEVICE — NDL ECLIPSE SAFETY 18GX1-1/2IN

## (undated) DEVICE — APPLICATOR CHLORAPREP ORN 26ML

## (undated) DEVICE — COVER OVERHEAD SURG LT BLUE

## (undated) DEVICE — GOWN SURG 2XL DISP TIE BACK

## (undated) DEVICE — SEE MEDLINE ITEM 157131

## (undated) DEVICE — PAD CAST SPECIALIST STRL 3

## (undated) DEVICE — SEE MEDLINE ITEM 157117

## (undated) DEVICE — SUT 4-0 ETHILON 18 PS-2

## (undated) DEVICE — SCRUB HIBICLENS 4% CHG 4OZ

## (undated) DEVICE — SOL 9P NACL IRR PIC IL

## (undated) DEVICE — SEE MEDLINE ITEM 152522

## (undated) DEVICE — UNDERGLOVES BIOGEL PI SIZE 7.5

## (undated) DEVICE — NDL SAFETY 25G X 1.5 ECLIPSE

## (undated) DEVICE — SEE MEDLINE ITEM 146308

## (undated) DEVICE — DRESSING XEROFORM FOIL PK 1X8

## (undated) DEVICE — COVER CAMERA OPERATING ROOM